# Patient Record
Sex: MALE | Race: WHITE | NOT HISPANIC OR LATINO | Employment: OTHER
[De-identification: names, ages, dates, MRNs, and addresses within clinical notes are randomized per-mention and may not be internally consistent; named-entity substitution may affect disease eponyms.]

---

## 2018-06-18 ENCOUNTER — HOSPITAL ENCOUNTER (OUTPATIENT)
Dept: HOME HEALTH SERVICES | Facility: HOME HEALTHCARE | Age: 38
Setting detail: SPECIMEN
Discharge: HOME | End: 2018-06-18
Attending: INTERNAL MEDICINE | Admitting: INTERNAL MEDICINE

## 2018-06-18 LAB
ANION GAP SERPL CALC-SCNC: 16.8 MMOL/L (ref 10–20)
BUN SERPL-MCNC: 80 MG/DL (ref 8–20)
BUN/CREAT SERPL: 34.8 (ref 6.2–20.3)
CALCIUM SERPL-MCNC: 9.6 MG/DL (ref 8.9–10.3)
CHLORIDE SERPL-SCNC: 93 MMOL/L (ref 101–111)
CONV CO2: 32 MMOL/L (ref 22–32)
CREAT UR-MCNC: 2.3 MG/DL (ref 0.7–1.2)
GLUCOSE SERPL-MCNC: 157 MG/DL (ref 65–99)
POTASSIUM SERPL-SCNC: 4.8 MMOL/L (ref 3.6–5.1)
SODIUM SERPL-SCNC: 137 MMOL/L (ref 136–144)

## 2019-03-11 ENCOUNTER — INPATIENT HOSPITAL (OUTPATIENT)
Dept: URBAN - METROPOLITAN AREA HOSPITAL 84 | Facility: HOSPITAL | Age: 39
End: 2019-03-11

## 2019-03-11 DIAGNOSIS — R18.8 OTHER ASCITES: ICD-10-CM

## 2019-03-11 DIAGNOSIS — K59.00 CONSTIPATION, UNSPECIFIED: ICD-10-CM

## 2019-03-11 DIAGNOSIS — J96.90 RESPIRATORY FAILURE, UNSPECIFIED, UNSPECIFIED WHETHER WITH H: ICD-10-CM

## 2019-03-11 DIAGNOSIS — D50.9 IRON DEFICIENCY ANEMIA, UNSPECIFIED: ICD-10-CM

## 2019-03-11 PROCEDURE — 99222 1ST HOSP IP/OBS MODERATE 55: CPT | Performed by: NURSE PRACTITIONER

## 2019-03-12 ENCOUNTER — INPATIENT HOSPITAL (OUTPATIENT)
Dept: URBAN - METROPOLITAN AREA HOSPITAL 84 | Facility: HOSPITAL | Age: 39
End: 2019-03-12

## 2019-03-12 DIAGNOSIS — R18.8 OTHER ASCITES: ICD-10-CM

## 2019-03-12 DIAGNOSIS — D50.9 IRON DEFICIENCY ANEMIA, UNSPECIFIED: ICD-10-CM

## 2019-03-12 DIAGNOSIS — K59.00 CONSTIPATION, UNSPECIFIED: ICD-10-CM

## 2019-03-12 DIAGNOSIS — J96.90 RESPIRATORY FAILURE, UNSPECIFIED, UNSPECIFIED WHETHER WITH H: ICD-10-CM

## 2019-03-12 PROCEDURE — 99232 SBSQ HOSP IP/OBS MODERATE 35: CPT | Performed by: NURSE PRACTITIONER

## 2019-03-13 PROCEDURE — 99232 SBSQ HOSP IP/OBS MODERATE 35: CPT | Performed by: NURSE PRACTITIONER

## 2019-03-14 ENCOUNTER — INPATIENT HOSPITAL (OUTPATIENT)
Dept: URBAN - METROPOLITAN AREA HOSPITAL 84 | Facility: HOSPITAL | Age: 39
End: 2019-03-14

## 2019-03-14 DIAGNOSIS — D50.9 IRON DEFICIENCY ANEMIA, UNSPECIFIED: ICD-10-CM

## 2019-03-14 DIAGNOSIS — R18.8 OTHER ASCITES: ICD-10-CM

## 2019-03-14 DIAGNOSIS — K59.00 CONSTIPATION, UNSPECIFIED: ICD-10-CM

## 2019-03-14 DIAGNOSIS — J96.90 RESPIRATORY FAILURE, UNSPECIFIED, UNSPECIFIED WHETHER WITH H: ICD-10-CM

## 2019-03-14 PROCEDURE — 99232 SBSQ HOSP IP/OBS MODERATE 35: CPT | Performed by: NURSE PRACTITIONER

## 2019-03-25 ENCOUNTER — HOSPITAL ENCOUNTER (OUTPATIENT)
Dept: INFUSION THERAPY | Facility: HOSPITAL | Age: 39
Discharge: HOME | End: 2019-03-25
Attending: INTERNAL MEDICINE | Admitting: INTERNAL MEDICINE

## 2019-03-25 LAB
HCT VFR BLD AUTO: 27.8 % (ref 40–54)
HGB BLD-MCNC: 8.5 G/DL (ref 14–18)

## 2019-03-27 ENCOUNTER — HOSPITAL ENCOUNTER (OUTPATIENT)
Dept: ONCOLOGY | Facility: CLINIC | Age: 39
Setting detail: INFUSION SERIES
Discharge: HOME | End: 2019-03-27
Attending: INTERNAL MEDICINE | Admitting: INTERNAL MEDICINE

## 2019-03-27 ENCOUNTER — CLINICAL SUPPORT (OUTPATIENT)
Dept: ONCOLOGY | Facility: HOSPITAL | Age: 39
End: 2019-03-27

## 2019-03-27 NOTE — PROGRESS NOTES
PATIENTS ONCOLOGY RECORD LOCATED IN [facility]      Subjective     Name:  ZOHRA HERNANDEZ     Date:  2019  Address:  [address] [place] [address]  Home: [unfilled]  :  1980 AGE:  38 y.o.        RECORDS OBTAINED:  Patients Oncology Record is located in [facility]

## 2019-03-29 ENCOUNTER — LAB REQUISITION (OUTPATIENT)
Dept: LAB | Facility: HOSPITAL | Age: 39
End: 2019-03-29

## 2019-03-29 DIAGNOSIS — Z00.00 ROUTINE GENERAL MEDICAL EXAMINATION AT A HEALTH CARE FACILITY: ICD-10-CM

## 2019-03-29 LAB
ALBUMIN SERPL-MCNC: 3.7 G/DL (ref 3.5–5.2)
ALBUMIN/GLOB SERPL: 1.7 G/DL
ALP SERPL-CCNC: 89 U/L (ref 39–117)
ALT SERPL W P-5'-P-CCNC: 29 U/L (ref 1–41)
ANION GAP SERPL CALCULATED.3IONS-SCNC: 11.5 MMOL/L
AST SERPL-CCNC: 21 U/L (ref 1–40)
BASOPHILS # BLD AUTO: 0.01 10*3/MM3 (ref 0–0.2)
BASOPHILS NFR BLD AUTO: 0.1 % (ref 0–1.5)
BILIRUB SERPL-MCNC: 0.5 MG/DL (ref 0.2–1.2)
BUN BLD-MCNC: 42 MG/DL (ref 6–20)
BUN/CREAT SERPL: 25.1 (ref 7–25)
CALCIUM SPEC-SCNC: 8.5 MG/DL (ref 8.6–10.5)
CHLORIDE SERPL-SCNC: 105 MMOL/L (ref 98–107)
CO2 SERPL-SCNC: 25.5 MMOL/L (ref 22–29)
CREAT BLD-MCNC: 1.67 MG/DL (ref 0.76–1.27)
DEPRECATED RDW RBC AUTO: 62.9 FL (ref 37–54)
EOSINOPHIL # BLD AUTO: 0.04 10*3/MM3 (ref 0–0.4)
EOSINOPHIL NFR BLD AUTO: 0.4 % (ref 0.3–6.2)
ERYTHROCYTE [DISTWIDTH] IN BLOOD BY AUTOMATED COUNT: 19.7 % (ref 12.3–15.4)
GFR SERPL CREATININE-BSD FRML MDRD: 47 ML/MIN/1.73
GLOBULIN UR ELPH-MCNC: 2.2 GM/DL
GLUCOSE BLD-MCNC: 86 MG/DL (ref 65–99)
HCT VFR BLD AUTO: 28.1 % (ref 37.5–51)
HGB BLD-MCNC: 8.1 G/DL (ref 13–17.7)
IMM GRANULOCYTES # BLD AUTO: 0.07 10*3/MM3 (ref 0–0.05)
IMM GRANULOCYTES NFR BLD AUTO: 0.7 % (ref 0–0.5)
LYMPHOCYTES # BLD AUTO: 0.97 10*3/MM3 (ref 0.7–3.1)
LYMPHOCYTES NFR BLD AUTO: 10.3 % (ref 19.6–45.3)
MCH RBC QN AUTO: 25.2 PG (ref 26.6–33)
MCHC RBC AUTO-ENTMCNC: 28.8 G/DL (ref 31.5–35.7)
MCV RBC AUTO: 87.3 FL (ref 79–97)
MONOCYTES # BLD AUTO: 0.65 10*3/MM3 (ref 0.1–0.9)
MONOCYTES NFR BLD AUTO: 6.9 % (ref 5–12)
NEUTROPHILS # BLD AUTO: 7.67 10*3/MM3 (ref 1.4–7)
NEUTROPHILS NFR BLD AUTO: 81.6 % (ref 42.7–76)
NRBC BLD AUTO-RTO: 0 /100 WBC (ref 0–0)
PLATELET # BLD AUTO: 88 10*3/MM3 (ref 140–450)
PMV BLD AUTO: 12.4 FL (ref 6–12)
POTASSIUM BLD-SCNC: 5.2 MMOL/L (ref 3.5–5.2)
PROT SERPL-MCNC: 5.9 G/DL (ref 6–8.5)
RBC # BLD AUTO: 3.22 10*6/MM3 (ref 4.14–5.8)
SODIUM BLD-SCNC: 142 MMOL/L (ref 136–145)
TROPONIN T SERPL-MCNC: 0.03 NG/ML (ref 0–0.03)
WBC NRBC COR # BLD: 9.41 10*3/MM3 (ref 3.4–10.8)

## 2019-03-29 PROCEDURE — 85025 COMPLETE CBC W/AUTO DIFF WBC: CPT

## 2019-03-29 PROCEDURE — 80053 COMPREHEN METABOLIC PANEL: CPT

## 2019-03-29 PROCEDURE — 84484 ASSAY OF TROPONIN QUANT: CPT

## 2019-04-03 ENCOUNTER — HOSPITAL ENCOUNTER (OUTPATIENT)
Dept: INFUSION THERAPY | Facility: HOSPITAL | Age: 39
Discharge: HOME | End: 2019-04-03
Attending: INTERNAL MEDICINE | Admitting: INTERNAL MEDICINE

## 2019-04-03 LAB
BASOPHILS # BLD AUTO: 0 10*3/UL (ref 0–0.2)
BASOPHILS NFR BLD AUTO: 0 % (ref 0–2)
DIFFERENTIAL METHOD BLD: (no result)
EOSINOPHIL # BLD AUTO: 0 % (ref 0–3)
EOSINOPHIL # BLD AUTO: 0 10*3/UL (ref 0–0.3)
ERYTHROCYTE [DISTWIDTH] IN BLOOD BY AUTOMATED COUNT: 21.2 % (ref 11.5–14.5)
HCT VFR BLD AUTO: 25.6 % (ref 40–54)
HGB BLD-MCNC: (no result) G/DL (ref 14–18)
LYMPHOCYTES # BLD AUTO: 0.4 10*3/UL (ref 0.8–4.8)
LYMPHOCYTES NFR BLD AUTO: 6 % (ref 18–42)
Lab: NORMAL
MCH RBC QN AUTO: 25.7 PG (ref 26–32)
MCHC RBC AUTO-ENTMCNC: 31.1 G/DL (ref 32–36)
MCV RBC AUTO: 82.8 FL (ref 80–94)
MONOCYTES # BLD AUTO: 0.1 10*3/UL (ref 0.1–1.3)
MONOCYTES NFR BLD AUTO: 2 % (ref 2–11)
NEUTROPHILS # BLD AUTO: 6.4 10*3/UL (ref 2.3–8.6)
NEUTROPHILS NFR BLD AUTO: 92 % (ref 50–75)
NRBC BLD AUTO-RTO: 0 /100{WBCS}
NRBC/RBC NFR BLD MANUAL: 0 10*3/UL
PLATELET # BLD AUTO: (no result) 10*3/UL (ref 150–450)
PMV BLD AUTO: 8.1 FL (ref 7.4–10.4)
RBC # BLD AUTO: 3.09 10*6/UL (ref 4.6–6)
WBC # BLD AUTO: 7 10*3/UL (ref 4.5–11.5)

## 2019-04-04 ENCOUNTER — HOSPITAL ENCOUNTER (OUTPATIENT)
Dept: ONCOLOGY | Facility: CLINIC | Age: 39
Setting detail: INFUSION SERIES
Discharge: HOME | End: 2019-04-04
Attending: INTERNAL MEDICINE | Admitting: INTERNAL MEDICINE

## 2019-04-04 ENCOUNTER — CLINICAL SUPPORT (OUTPATIENT)
Dept: ONCOLOGY | Facility: HOSPITAL | Age: 39
End: 2019-04-04

## 2019-04-04 NOTE — PROGRESS NOTES
PATIENTS ONCOLOGY RECORD LOCATED IN [facility]      Subjective     Name:  ZOHRA HERNANDEZ     Date:  2019  Address:  [address] [place] [address] [place] [facility]  Home: [unfilled]  :  1980 AGE:  38 y.o.        RECORDS OBTAINED:  Patients Oncology Record is located in [facility]

## 2019-04-05 ENCOUNTER — HOSPITAL ENCOUNTER (OUTPATIENT)
Dept: ONCOLOGY | Facility: CLINIC | Age: 39
Setting detail: INFUSION SERIES
Discharge: HOME | End: 2019-04-05
Attending: INTERNAL MEDICINE | Admitting: INTERNAL MEDICINE

## 2019-04-08 ENCOUNTER — CLINICAL SUPPORT (OUTPATIENT)
Dept: ONCOLOGY | Facility: HOSPITAL | Age: 39
End: 2019-04-08

## 2019-04-08 ENCOUNTER — HOSPITAL ENCOUNTER (OUTPATIENT)
Dept: ONCOLOGY | Facility: CLINIC | Age: 39
Setting detail: INFUSION SERIES
Discharge: HOME | End: 2019-04-08
Attending: INTERNAL MEDICINE | Admitting: INTERNAL MEDICINE

## 2019-04-08 NOTE — PROGRESS NOTES
PATIENTS ONCOLOGY RECORD LOCATED IN [facility]      Subjective     Name:  ZOHRA HERNANDEZ     Date:  2019  Address:  [address] [place] [address] [place] [facility]  Home: [unfilled]  :  1980 AGE:  38 y.o.        RECORDS OBTAINED:  Patients Oncology Record is located in [facility]

## 2019-04-15 ENCOUNTER — HOSPITAL ENCOUNTER (OUTPATIENT)
Dept: ONCOLOGY | Facility: HOSPITAL | Age: 39
Discharge: HOME | End: 2019-04-15
Attending: INTERNAL MEDICINE | Admitting: INTERNAL MEDICINE

## 2019-04-15 ENCOUNTER — CLINICAL SUPPORT (OUTPATIENT)
Dept: ONCOLOGY | Facility: HOSPITAL | Age: 39
End: 2019-04-15

## 2019-04-15 ENCOUNTER — HOSPITAL ENCOUNTER (OUTPATIENT)
Dept: ONCOLOGY | Facility: CLINIC | Age: 39
Setting detail: INFUSION SERIES
Discharge: HOME | End: 2019-04-15
Attending: INTERNAL MEDICINE | Admitting: INTERNAL MEDICINE

## 2019-04-18 ENCOUNTER — CLINICAL SUPPORT (OUTPATIENT)
Dept: ONCOLOGY | Facility: HOSPITAL | Age: 39
End: 2019-04-18

## 2019-04-18 ENCOUNTER — HOSPITAL ENCOUNTER (OUTPATIENT)
Dept: ONCOLOGY | Facility: HOSPITAL | Age: 39
Discharge: HOME | End: 2019-04-18
Attending: INTERNAL MEDICINE | Admitting: INTERNAL MEDICINE

## 2019-04-18 ENCOUNTER — HOSPITAL ENCOUNTER (OUTPATIENT)
Dept: ONCOLOGY | Facility: CLINIC | Age: 39
Setting detail: INFUSION SERIES
Discharge: HOME | End: 2019-04-18
Attending: INTERNAL MEDICINE | Admitting: INTERNAL MEDICINE

## 2019-04-18 LAB
ANION GAP SERPL CALC-SCNC: 19.8 MMOL/L (ref 10–20)
BUN SERPL-MCNC: 61 MG/DL (ref 8–20)
BUN/CREAT SERPL: 26.5 (ref 6.2–20.3)
CALCIUM SERPL-MCNC: 9.1 MG/DL (ref 8.9–10.3)
CHLORIDE SERPL-SCNC: 99 MMOL/L (ref 101–111)
CONV CO2: 26 MMOL/L (ref 22–32)
CREAT UR-MCNC: 2.3 MG/DL (ref 0.7–1.2)
GLUCOSE SERPL-MCNC: 142 MG/DL (ref 65–99)
POTASSIUM SERPL-SCNC: 4.8 MMOL/L (ref 3.6–5.1)
SODIUM SERPL-SCNC: 140 MMOL/L (ref 136–144)

## 2019-04-18 NOTE — PROGRESS NOTES
PATIENTS ONCOLOGY RECORD LOCATED IN [facility]      Subjective     Name:  ZOHRA HERNANDEZ     Date:  2019  Address:  [address] [place] [address] [place] [facility]  Home: [unfilled]  :  1980 AGE:  38 y.o.        RECORDS OBTAINED:  Patients Oncology Record is located in [facility]

## 2019-08-19 ENCOUNTER — TELEPHONE (OUTPATIENT)
Dept: ONCOLOGY | Facility: CLINIC | Age: 39
End: 2019-08-19

## 2019-08-19 NOTE — TELEPHONE ENCOUNTER
Mr. Crockett left 2nd message about scheduling follow up with Dr. Mak.  Returned call to [phone], no ans.  Contacted other home number listed, which was for nursing facility and he is no longer there.  Tried [phone], number rang and then went to busy.

## 2019-08-19 NOTE — TELEPHONE ENCOUNTER
Mr. Crockett left message 8/16 to schedule appt w/ Dr. Mak.  Returned his call on 8/19, left message for him to recall to reschedule.

## 2019-08-20 ENCOUNTER — TELEPHONE (OUTPATIENT)
Dept: ONCOLOGY | Facility: CLINIC | Age: 39
End: 2019-08-20

## 2019-08-20 NOTE — TELEPHONE ENCOUNTER
Returned call to Mr. Crockett from 8/19 message.  Scheduled appt for 8/30 as he preferred Fridays.

## 2019-08-21 ENCOUNTER — HOSPITAL ENCOUNTER (INPATIENT)
Facility: HOSPITAL | Age: 39
LOS: 9 days | Discharge: HOME | End: 2019-08-30
Attending: EMERGENCY MEDICINE | Admitting: INTERNAL MEDICINE

## 2019-08-21 ENCOUNTER — APPOINTMENT (OUTPATIENT)
Dept: ULTRASOUND IMAGING | Facility: HOSPITAL | Age: 39
End: 2019-08-21

## 2019-08-21 ENCOUNTER — APPOINTMENT (OUTPATIENT)
Dept: GENERAL RADIOLOGY | Facility: HOSPITAL | Age: 39
End: 2019-08-21

## 2019-08-21 DIAGNOSIS — D69.6 THROMBOCYTOPENIA (HCC): ICD-10-CM

## 2019-08-21 DIAGNOSIS — R18.8 OTHER ASCITES: Primary | ICD-10-CM

## 2019-08-21 DIAGNOSIS — E87.70 HYPERVOLEMIA, UNSPECIFIED HYPERVOLEMIA TYPE: ICD-10-CM

## 2019-08-21 DIAGNOSIS — N18.4 STAGE 4 CHRONIC KIDNEY DISEASE (HCC): ICD-10-CM

## 2019-08-21 DIAGNOSIS — N18.30 CHRONIC RENAL FAILURE, STAGE 3 (MODERATE) (HCC): ICD-10-CM

## 2019-08-21 DIAGNOSIS — R07.9 CHEST PAIN, UNSPECIFIED TYPE: ICD-10-CM

## 2019-08-21 PROBLEM — J45.909 ASTHMA: Chronic | Status: ACTIVE | Noted: 2019-08-21

## 2019-08-21 PROBLEM — M19.90 ARTHRITIS: Status: ACTIVE | Noted: 2019-08-21

## 2019-08-21 PROBLEM — J45.909 ASTHMA: Status: ACTIVE | Noted: 2019-08-21

## 2019-08-21 PROBLEM — E55.9 VITAMIN D DEFICIENCY: Status: ACTIVE | Noted: 2019-08-21

## 2019-08-21 PROBLEM — E55.9 VITAMIN D DEFICIENCY: Chronic | Status: ACTIVE | Noted: 2019-08-21

## 2019-08-21 PROBLEM — I10 BENIGN ESSENTIAL HYPERTENSION: Status: ACTIVE | Noted: 2019-08-21

## 2019-08-21 PROBLEM — F32.A DEPRESSION: Status: ACTIVE | Noted: 2019-08-21

## 2019-08-21 PROBLEM — I73.9 PVD (PERIPHERAL VASCULAR DISEASE) (HCC): Status: ACTIVE | Noted: 2019-08-21

## 2019-08-21 PROBLEM — F32.A DEPRESSION: Chronic | Status: ACTIVE | Noted: 2019-08-21

## 2019-08-21 LAB
ALBUMIN SERPL-MCNC: 2.3 G/DL (ref 3.5–4.8)
ALBUMIN/GLOB SERPL: 0.5 G/DL (ref 1–1.7)
ALP SERPL-CCNC: 138 U/L (ref 32–91)
ALT SERPL W P-5'-P-CCNC: 38 U/L (ref 17–63)
ANION GAP SERPL CALCULATED.3IONS-SCNC: 18.4 MMOL/L (ref 5–15)
AST SERPL-CCNC: 46 U/L (ref 15–41)
BASOPHILS # BLD AUTO: 0.1 10*3/MM3 (ref 0–0.2)
BASOPHILS NFR BLD AUTO: 0.5 % (ref 0–1.5)
BILIRUB SERPL-MCNC: 0.4 MG/DL (ref 0.3–1.2)
BNP SERPL-MCNC: 1453 PG/ML
BUN BLD-MCNC: 83 MG/DL (ref 8–20)
BUN/CREAT SERPL: 34.6 (ref 6.2–20.3)
CALCIUM SPEC-SCNC: 8.4 MG/DL (ref 8.9–10.3)
CHLORIDE SERPL-SCNC: 97 MMOL/L (ref 101–111)
CK SERPL-CCNC: 25 U/L (ref 49–397)
CO2 SERPL-SCNC: 22 MMOL/L (ref 22–32)
CREAT BLD-MCNC: 2.4 MG/DL (ref 0.7–1.2)
CREAT UR-MCNC: 32.9 MG/DL
DEPRECATED RDW RBC AUTO: 56 FL (ref 37–54)
EOSINOPHIL # BLD AUTO: 0.1 10*3/MM3 (ref 0–0.4)
EOSINOPHIL NFR BLD AUTO: 0.4 % (ref 0.3–6.2)
EOSINOPHIL SPEC QL MICRO: 0 % EOS/100 CELLS (ref 0–0)
ERYTHROCYTE [DISTWIDTH] IN BLOOD BY AUTOMATED COUNT: 17.9 % (ref 12.3–15.4)
GFR SERPL CREATININE-BSD FRML MDRD: 31 ML/MIN/1.73
GLOBULIN UR ELPH-MCNC: 4.5 GM/DL (ref 2.5–3.8)
GLUCOSE BLD-MCNC: 125 MG/DL (ref 65–99)
HCT VFR BLD AUTO: 27.5 % (ref 37.5–51)
HGB BLD-MCNC: 8.5 G/DL (ref 13–17.7)
HOLD SPECIMEN: NORMAL
INR PPP: 1.49 (ref 0.9–1.1)
INR PPP: 1.56 (ref 0.9–1.1)
LIPASE SERPL-CCNC: 30 U/L (ref 22–51)
LYMPHOCYTES # BLD AUTO: 1.3 10*3/MM3 (ref 0.7–3.1)
LYMPHOCYTES NFR BLD AUTO: 10.8 % (ref 19.6–45.3)
MCH RBC QN AUTO: 26.8 PG (ref 26.6–33)
MCHC RBC AUTO-ENTMCNC: 30.8 G/DL (ref 31.5–35.7)
MCV RBC AUTO: 87 FL (ref 79–97)
MONOCYTES # BLD AUTO: 1.3 10*3/MM3 (ref 0.1–0.9)
MONOCYTES NFR BLD AUTO: 10.9 % (ref 5–12)
NEUTROPHILS # BLD AUTO: 9 10*3/MM3 (ref 1.7–7)
NEUTROPHILS NFR BLD AUTO: 77.4 % (ref 42.7–76)
NRBC BLD AUTO-RTO: 0.3 /100 WBC (ref 0–0.2)
PLATELET # BLD AUTO: 53 10*3/MM3 (ref 140–450)
PMV BLD AUTO: 8.3 FL (ref 6–12)
POTASSIUM BLD-SCNC: 4.6 MMOL/L (ref 3.6–5.1)
POTASSIUM BLD-SCNC: 5.4 MMOL/L (ref 3.6–5.1)
PROCALCITONIN SERPL-MCNC: 10.77 NG/ML (ref 0–0.5)
PROT SERPL-MCNC: 6.8 G/DL (ref 6.1–7.9)
PROT UR-MCNC: 295 MG/DL
PROT/CREAT UR: 8966.6 MG/G CREA (ref 0–200)
PROTHROMBIN TIME: 14.6 SECONDS (ref 9.6–11.7)
PROTHROMBIN TIME: 15.3 SECONDS (ref 9.6–11.7)
RBC # BLD AUTO: 3.16 10*6/MM3 (ref 4.14–5.8)
SODIUM BLD-SCNC: 132 MMOL/L (ref 136–144)
SODIUM UR-SCNC: 70 MMOL/L
TROPONIN I SERPL-MCNC: 0.03 NG/ML (ref 0–0.03)
TROPONIN I SERPL-MCNC: 0.03 NG/ML (ref 0–0.03)
URATE SERPL-MCNC: 7.4 MG/DL (ref 4.8–8.7)
WBC NRBC COR # BLD: 11.6 10*3/MM3 (ref 3.4–10.8)

## 2019-08-21 PROCEDURE — 83880 ASSAY OF NATRIURETIC PEPTIDE: CPT | Performed by: EMERGENCY MEDICINE

## 2019-08-21 PROCEDURE — 80053 COMPREHEN METABOLIC PANEL: CPT | Performed by: EMERGENCY MEDICINE

## 2019-08-21 PROCEDURE — 83690 ASSAY OF LIPASE: CPT | Performed by: EMERGENCY MEDICINE

## 2019-08-21 PROCEDURE — 85610 PROTHROMBIN TIME: CPT | Performed by: NURSE PRACTITIONER

## 2019-08-21 PROCEDURE — 25010000002 ALBUMIN HUMAN 25% PER 50 ML: Performed by: INTERNAL MEDICINE

## 2019-08-21 PROCEDURE — 84484 ASSAY OF TROPONIN QUANT: CPT | Performed by: NURSE PRACTITIONER

## 2019-08-21 PROCEDURE — 85610 PROTHROMBIN TIME: CPT | Performed by: EMERGENCY MEDICINE

## 2019-08-21 PROCEDURE — 84165 PROTEIN E-PHORESIS SERUM: CPT | Performed by: INTERNAL MEDICINE

## 2019-08-21 PROCEDURE — 71045 X-RAY EXAM CHEST 1 VIEW: CPT | Performed by: EMERGENCY MEDICINE

## 2019-08-21 PROCEDURE — 63710000001 MYCOPHENOLATE MOFETIL PER 250 MG: Performed by: INTERNAL MEDICINE

## 2019-08-21 PROCEDURE — 99284 EMERGENCY DEPT VISIT MOD MDM: CPT

## 2019-08-21 PROCEDURE — 84550 ASSAY OF BLOOD/URIC ACID: CPT | Performed by: INTERNAL MEDICINE

## 2019-08-21 PROCEDURE — 86160 COMPLEMENT ANTIGEN: CPT | Performed by: INTERNAL MEDICINE

## 2019-08-21 PROCEDURE — 82550 ASSAY OF CK (CPK): CPT | Performed by: INTERNAL MEDICINE

## 2019-08-21 PROCEDURE — P9047 ALBUMIN (HUMAN), 25%, 50ML: HCPCS | Performed by: INTERNAL MEDICINE

## 2019-08-21 PROCEDURE — 84156 ASSAY OF PROTEIN URINE: CPT | Performed by: INTERNAL MEDICINE

## 2019-08-21 PROCEDURE — 76775 US EXAM ABDO BACK WALL LIM: CPT | Performed by: INTERNAL MEDICINE

## 2019-08-21 PROCEDURE — 84300 ASSAY OF URINE SODIUM: CPT | Performed by: INTERNAL MEDICINE

## 2019-08-21 PROCEDURE — 84145 PROCALCITONIN (PCT): CPT | Performed by: NURSE PRACTITIONER

## 2019-08-21 PROCEDURE — 82570 ASSAY OF URINE CREATININE: CPT | Performed by: INTERNAL MEDICINE

## 2019-08-21 PROCEDURE — 99223 1ST HOSP IP/OBS HIGH 75: CPT | Performed by: INTERNAL MEDICINE

## 2019-08-21 PROCEDURE — 84132 ASSAY OF SERUM POTASSIUM: CPT | Performed by: INTERNAL MEDICINE

## 2019-08-21 PROCEDURE — 84484 ASSAY OF TROPONIN QUANT: CPT | Performed by: EMERGENCY MEDICINE

## 2019-08-21 PROCEDURE — 25010000002 FONDAPARINUX PER 0.5 MG: Performed by: NURSE PRACTITIONER

## 2019-08-21 PROCEDURE — 85025 COMPLETE CBC W/AUTO DIFF WBC: CPT | Performed by: EMERGENCY MEDICINE

## 2019-08-21 PROCEDURE — 87205 SMEAR GRAM STAIN: CPT | Performed by: INTERNAL MEDICINE

## 2019-08-21 PROCEDURE — 25010000002 CHLOROTHIAZIDE PER 500 MG: Performed by: INTERNAL MEDICINE

## 2019-08-21 PROCEDURE — 93005 ELECTROCARDIOGRAM TRACING: CPT | Performed by: EMERGENCY MEDICINE

## 2019-08-21 RX ORDER — QUETIAPINE FUMARATE 25 MG/1
50 TABLET, FILM COATED ORAL NIGHTLY
Status: DISCONTINUED | OUTPATIENT
Start: 2019-08-21 | End: 2019-08-28

## 2019-08-21 RX ORDER — FUROSEMIDE 20 MG/1
60 TABLET ORAL 2 TIMES DAILY
COMMUNITY
End: 2019-08-30 | Stop reason: HOSPADM

## 2019-08-21 RX ORDER — CLONIDINE HYDROCHLORIDE 0.1 MG/1
0.1 TABLET ORAL EVERY 12 HOURS SCHEDULED
Status: DISCONTINUED | OUTPATIENT
Start: 2019-08-21 | End: 2019-08-23

## 2019-08-21 RX ORDER — FAMOTIDINE 20 MG/1
20 TABLET, FILM COATED ORAL 2 TIMES DAILY PRN
COMMUNITY

## 2019-08-21 RX ORDER — MYCOPHENOLATE MOFETIL 250 MG/1
500 CAPSULE ORAL EVERY 12 HOURS SCHEDULED
Status: DISCONTINUED | OUTPATIENT
Start: 2019-08-21 | End: 2019-08-25

## 2019-08-21 RX ORDER — LEVETIRACETAM 500 MG/1
500 TABLET ORAL EVERY 12 HOURS SCHEDULED
Status: DISCONTINUED | OUTPATIENT
Start: 2019-08-21 | End: 2019-08-30 | Stop reason: HOSPADM

## 2019-08-21 RX ORDER — ALBUMIN (HUMAN) 12.5 G/50ML
112.5 SOLUTION INTRAVENOUS ONCE
Status: COMPLETED | OUTPATIENT
Start: 2019-08-21 | End: 2019-08-22

## 2019-08-21 RX ORDER — ALBUMIN (HUMAN) 12.5 G/50ML
75 SOLUTION INTRAVENOUS ONCE
Status: COMPLETED | OUTPATIENT
Start: 2019-08-21 | End: 2019-08-22

## 2019-08-21 RX ORDER — CALCIUM GLUCONATE 20 MG/ML
1 INJECTION, SOLUTION INTRAVENOUS ONCE
Status: COMPLETED | OUTPATIENT
Start: 2019-08-21 | End: 2019-08-21

## 2019-08-21 RX ORDER — CLONAZEPAM 0.5 MG/1
0.5 TABLET ORAL 2 TIMES DAILY
COMMUNITY

## 2019-08-21 RX ORDER — ALBUMIN (HUMAN) 12.5 G/50ML
62.5 SOLUTION INTRAVENOUS ONCE
Status: COMPLETED | OUTPATIENT
Start: 2019-08-21 | End: 2019-08-22

## 2019-08-21 RX ORDER — SODIUM POLYSTYRENE SULFONATE 15 G/60ML
30 SUSPENSION ORAL; RECTAL ONCE
Status: COMPLETED | OUTPATIENT
Start: 2019-08-21 | End: 2019-08-21

## 2019-08-21 RX ORDER — HYDRALAZINE HYDROCHLORIDE 100 MG/1
100 TABLET, FILM COATED ORAL 3 TIMES DAILY
COMMUNITY
End: 2019-08-30 | Stop reason: HOSPADM

## 2019-08-21 RX ORDER — CLONIDINE HYDROCHLORIDE 0.1 MG/1
0.1 TABLET ORAL 2 TIMES DAILY
COMMUNITY
End: 2019-08-30 | Stop reason: HOSPADM

## 2019-08-21 RX ORDER — FOLIC ACID 1 MG/1
1 TABLET ORAL DAILY
COMMUNITY

## 2019-08-21 RX ORDER — ALBUMIN (HUMAN) 12.5 G/50ML
37.5 SOLUTION INTRAVENOUS ONCE
Status: COMPLETED | OUTPATIENT
Start: 2019-08-21 | End: 2019-08-22

## 2019-08-21 RX ORDER — ALBUMIN (HUMAN) 12.5 G/50ML
100 SOLUTION INTRAVENOUS ONCE
Status: COMPLETED | OUTPATIENT
Start: 2019-08-21 | End: 2019-08-22

## 2019-08-21 RX ORDER — FAMOTIDINE 20 MG/1
20 TABLET, FILM COATED ORAL DAILY
Status: DISCONTINUED | OUTPATIENT
Start: 2019-08-21 | End: 2019-08-30 | Stop reason: HOSPADM

## 2019-08-21 RX ORDER — BACLOFEN 10 MG/1
20 TABLET ORAL 3 TIMES DAILY PRN
Status: DISCONTINUED | OUTPATIENT
Start: 2019-08-21 | End: 2019-08-28

## 2019-08-21 RX ORDER — FAMOTIDINE 20 MG/1
20 TABLET, FILM COATED ORAL 2 TIMES DAILY PRN
Status: DISCONTINUED | OUTPATIENT
Start: 2019-08-21 | End: 2019-08-22 | Stop reason: SDUPTHER

## 2019-08-21 RX ORDER — FAMOTIDINE 20 MG/1
20 TABLET, FILM COATED ORAL 2 TIMES DAILY
COMMUNITY
End: 2019-08-21

## 2019-08-21 RX ORDER — BUPRENORPHINE HYDROCHLORIDE 8 MG/1
20 TABLET SUBLINGUAL DAILY
COMMUNITY

## 2019-08-21 RX ORDER — CLONAZEPAM 0.5 MG/1
0.5 TABLET ORAL 2 TIMES DAILY
Status: DISCONTINUED | OUTPATIENT
Start: 2019-08-21 | End: 2019-08-30 | Stop reason: HOSPADM

## 2019-08-21 RX ORDER — ROPINIROLE 0.5 MG/1
0.5 TABLET, FILM COATED ORAL DAILY PRN
COMMUNITY

## 2019-08-21 RX ORDER — AMLODIPINE BESYLATE 10 MG/1
10 TABLET ORAL DAILY
COMMUNITY
End: 2019-12-13 | Stop reason: HOSPADM

## 2019-08-21 RX ORDER — LEVETIRACETAM 500 MG/1
500 TABLET ORAL 2 TIMES DAILY
COMMUNITY

## 2019-08-21 RX ORDER — GREEN TEA/HOODIA GORDONII 315-12.5MG
1 CAPSULE ORAL 2 TIMES DAILY
COMMUNITY

## 2019-08-21 RX ORDER — LACOSAMIDE 100 MG/1
100 TABLET ORAL 2 TIMES DAILY
Status: DISCONTINUED | OUTPATIENT
Start: 2019-08-21 | End: 2019-08-30 | Stop reason: HOSPADM

## 2019-08-21 RX ORDER — QUETIAPINE FUMARATE 50 MG/1
50 TABLET, FILM COATED ORAL NIGHTLY PRN
COMMUNITY

## 2019-08-21 RX ORDER — PHENOL 1.4 %
600 AEROSOL, SPRAY (ML) MUCOUS MEMBRANE 2 TIMES DAILY
COMMUNITY

## 2019-08-21 RX ORDER — GABAPENTIN 400 MG/1
400 CAPSULE ORAL 4 TIMES DAILY
Status: DISCONTINUED | OUTPATIENT
Start: 2019-08-21 | End: 2019-08-30 | Stop reason: HOSPADM

## 2019-08-21 RX ORDER — ALBUMIN (HUMAN) 12.5 G/50ML
87.5 SOLUTION INTRAVENOUS ONCE
Status: COMPLETED | OUTPATIENT
Start: 2019-08-21 | End: 2019-08-22

## 2019-08-21 RX ORDER — FONDAPARINUX SODIUM 7.5 MG/.6ML
7.5 INJECTION SUBCUTANEOUS DAILY
COMMUNITY

## 2019-08-21 RX ORDER — FOLIC ACID 1 MG/1
1 TABLET ORAL DAILY
Status: DISCONTINUED | OUTPATIENT
Start: 2019-08-22 | End: 2019-08-30 | Stop reason: HOSPADM

## 2019-08-21 RX ORDER — BUMETANIDE 0.25 MG/ML
2 INJECTION INTRAMUSCULAR; INTRAVENOUS ONCE
Status: COMPLETED | OUTPATIENT
Start: 2019-08-21 | End: 2019-08-21

## 2019-08-21 RX ORDER — CARVEDILOL 25 MG/1
25 TABLET ORAL 2 TIMES DAILY WITH MEALS
COMMUNITY

## 2019-08-21 RX ORDER — FONDAPARINUX SODIUM 7.5 MG/.6ML
7.5 INJECTION SUBCUTANEOUS DAILY
Status: DISCONTINUED | OUTPATIENT
Start: 2019-08-21 | End: 2019-08-25

## 2019-08-21 RX ORDER — SODIUM CHLORIDE 0.9 % (FLUSH) 0.9 %
10 SYRINGE (ML) INJECTION AS NEEDED
Status: DISCONTINUED | OUTPATIENT
Start: 2019-08-21 | End: 2019-08-30 | Stop reason: HOSPADM

## 2019-08-21 RX ORDER — ROPINIROLE 0.25 MG/1
0.5 TABLET, FILM COATED ORAL DAILY PRN
Status: DISCONTINUED | OUTPATIENT
Start: 2019-08-21 | End: 2019-08-28

## 2019-08-21 RX ORDER — ALBUMIN (HUMAN) 12.5 G/50ML
25 SOLUTION INTRAVENOUS EVERY 8 HOURS
Status: DISCONTINUED | OUTPATIENT
Start: 2019-08-21 | End: 2019-08-22

## 2019-08-21 RX ORDER — BACLOFEN 20 MG/1
20 TABLET ORAL 3 TIMES DAILY PRN
Status: ON HOLD | COMMUNITY
End: 2019-08-30 | Stop reason: SDUPTHER

## 2019-08-21 RX ORDER — LACOSAMIDE 100 MG/1
100 TABLET ORAL 2 TIMES DAILY
COMMUNITY

## 2019-08-21 RX ORDER — GABAPENTIN 800 MG/1
800 TABLET ORAL 4 TIMES DAILY
Status: ON HOLD | COMMUNITY
End: 2019-12-13 | Stop reason: SDUPTHER

## 2019-08-21 RX ORDER — BUMETANIDE 0.25 MG/ML
1 INJECTION INTRAMUSCULAR; INTRAVENOUS EVERY 8 HOURS
Status: DISCONTINUED | OUTPATIENT
Start: 2019-08-21 | End: 2019-08-22

## 2019-08-21 RX ORDER — AMLODIPINE BESYLATE 5 MG/1
10 TABLET ORAL DAILY
Status: DISCONTINUED | OUTPATIENT
Start: 2019-08-22 | End: 2019-08-23

## 2019-08-21 RX ORDER — CARVEDILOL 25 MG/1
25 TABLET ORAL 2 TIMES DAILY WITH MEALS
Status: DISCONTINUED | OUTPATIENT
Start: 2019-08-21 | End: 2019-08-23

## 2019-08-21 RX ORDER — PREDNISONE 20 MG/1
40 TABLET ORAL
Status: DISCONTINUED | OUTPATIENT
Start: 2019-08-22 | End: 2019-08-25

## 2019-08-21 RX ORDER — ALBUMIN (HUMAN) 12.5 G/50ML
50 SOLUTION INTRAVENOUS ONCE
Status: COMPLETED | OUTPATIENT
Start: 2019-08-21 | End: 2019-08-22

## 2019-08-21 RX ADMIN — QUETIAPINE 50 MG: 25 TABLET, FILM COATED ORAL at 22:41

## 2019-08-21 RX ADMIN — BUMETANIDE 2 MG: 0.25 INJECTION INTRAMUSCULAR; INTRAVENOUS at 11:36

## 2019-08-21 RX ADMIN — BUMETANIDE 1 MG: 0.25 INJECTION INTRAMUSCULAR; INTRAVENOUS at 19:59

## 2019-08-21 RX ADMIN — LEVETIRACETAM 500 MG: 500 TABLET, FILM COATED ORAL at 22:42

## 2019-08-21 RX ADMIN — CALCIUM GLUCONATE 50 ML: 20 INJECTION, SOLUTION INTRAVENOUS at 12:36

## 2019-08-21 RX ADMIN — CARVEDILOL 25 MG: 25 TABLET, FILM COATED ORAL at 22:42

## 2019-08-21 RX ADMIN — FAMOTIDINE 20 MG: 20 TABLET, FILM COATED ORAL at 19:59

## 2019-08-21 RX ADMIN — CHLOROTHIAZIDE SODIUM 250 MG: 500 INJECTION, POWDER, LYOPHILIZED, FOR SOLUTION INTRAVENOUS at 15:36

## 2019-08-21 RX ADMIN — CLONAZEPAM 0.5 MG: 0.5 TABLET ORAL at 22:42

## 2019-08-21 RX ADMIN — LACOSAMIDE 100 MG: 100 TABLET, FILM COATED ORAL at 22:41

## 2019-08-21 RX ADMIN — FONDAPARINUX SODIUM 7.5 MG: 7.5 INJECTION, SOLUTION SUBCUTANEOUS at 23:56

## 2019-08-21 RX ADMIN — CLONIDINE HYDROCHLORIDE 0.1 MG: 0.1 TABLET ORAL at 22:41

## 2019-08-21 RX ADMIN — MYCOPHENOLATE MOFETIL 500 MG: 250 CAPSULE ORAL at 20:05

## 2019-08-21 RX ADMIN — GABAPENTIN 400 MG: 400 CAPSULE ORAL at 22:42

## 2019-08-21 RX ADMIN — ALBUMIN HUMAN 25 G: 0.25 SOLUTION INTRAVENOUS at 17:58

## 2019-08-21 RX ADMIN — SODIUM POLYSTYRENE SULFONATE 30 G: 15 SUSPENSION ORAL; RECTAL at 12:36

## 2019-08-21 RX ADMIN — OYSTER SHELL CALCIUM WITH VITAMIN D 1 TABLET: 500; 200 TABLET, FILM COATED ORAL at 22:42

## 2019-08-21 RX ADMIN — SODIUM BICARBONATE 50 MEQ: 84 INJECTION, SOLUTION INTRAVENOUS at 12:33

## 2019-08-22 PROBLEM — L97.922 NON-PRESSURE CHRONIC ULCER OF LEFT LOWER LEG WITH FAT LAYER EXPOSED (HCC): Status: ACTIVE | Noted: 2019-08-22

## 2019-08-22 LAB
ALBUMIN FLD-MCNC: 1.4 G/DL
ALBUMIN SERPL-MCNC: 2.6 G/DL (ref 3.5–4.8)
ALBUMIN/GLOB SERPL: 0.7 G/DL (ref 1–1.7)
ALP SERPL-CCNC: 118 U/L (ref 32–91)
ALT SERPL W P-5'-P-CCNC: 28 U/L (ref 17–63)
AMYLASE FLD-CCNC: 15 U/L
ANION GAP SERPL CALCULATED.3IONS-SCNC: 20.2 MMOL/L (ref 5–15)
APPEARANCE FLD: ABNORMAL
AST SERPL-CCNC: 23 U/L (ref 15–41)
BILIRUB SERPL-MCNC: 0.9 MG/DL (ref 0.3–1.2)
BUN BLD-MCNC: 86 MG/DL (ref 8–20)
BUN/CREAT SERPL: 34.4 (ref 6.2–20.3)
CA-I SERPL ISE-MCNC: 1.17 MMOL/L (ref 1.2–1.3)
CALCIUM SPEC-SCNC: 8.4 MG/DL (ref 8.9–10.3)
CHLORIDE SERPL-SCNC: 97 MMOL/L (ref 101–111)
CO2 SERPL-SCNC: 23 MMOL/L (ref 22–32)
COLOR FLD: YELLOW
CREAT BLD-MCNC: 2.5 MG/DL (ref 0.7–1.2)
DEPRECATED RDW RBC AUTO: 56.4 FL (ref 37–54)
ERYTHROCYTE [DISTWIDTH] IN BLOOD BY AUTOMATED COUNT: 17.8 % (ref 12.3–15.4)
GFR SERPL CREATININE-BSD FRML MDRD: 29 ML/MIN/1.73
GLOBULIN UR ELPH-MCNC: 3.9 GM/DL (ref 2.5–3.8)
GLUCOSE BLD-MCNC: 93 MG/DL (ref 65–99)
GLUCOSE FLD-MCNC: 177 MG/DL
HCT VFR BLD AUTO: 25.5 % (ref 37.5–51)
HGB BLD-MCNC: 7.9 G/DL (ref 13–17.7)
INR PPP: 1.36 (ref 2–3)
IRON 24H UR-MRATE: 13 MCG/DL (ref 45–182)
LDH FLD-CCNC: 72 U/L
LYMPHOCYTES NFR FLD MANUAL: 3 %
MAGNESIUM SERPL-MCNC: 2.4 MG/DL (ref 1.8–2.5)
MCH RBC QN AUTO: 27.4 PG (ref 26.6–33)
MCHC RBC AUTO-ENTMCNC: 31 G/DL (ref 31.5–35.7)
MCV RBC AUTO: 88.4 FL (ref 79–97)
METHOD: ABNORMAL
MONOCYTES NFR FLD: 5 %
NEUTROPHILS NFR FLD MANUAL: 92 %
NUC CELL # FLD: 1515 /MM3
PHOSPHATE SERPL-MCNC: 6.8 MG/DL (ref 2.4–4.7)
PLATELET # BLD AUTO: 58 10*3/MM3 (ref 140–450)
PMV BLD AUTO: 8.6 FL (ref 6–12)
POTASSIUM BLD-SCNC: 4.2 MMOL/L (ref 3.6–5.1)
PROT FLD-MCNC: 3.4 G/DL
PROT SERPL-MCNC: 6.5 G/DL (ref 6.1–7.9)
PROTHROMBIN TIME: 13.5 SECONDS (ref 19.4–28.5)
RBC # BLD AUTO: 2.89 10*6/MM3 (ref 4.14–5.8)
SODIUM BLD-SCNC: 136 MMOL/L (ref 136–144)
TROPONIN I SERPL-MCNC: 0.03 NG/ML (ref 0–0.03)
TROPONIN I SERPL-MCNC: 0.03 NG/ML (ref 0–0.03)
TROPONIN I SERPL-MCNC: <0.03 NG/ML (ref 0–0.03)
TSH SERPL DL<=0.05 MIU/L-ACNC: 4.55 MIU/ML (ref 0.34–5.6)
WBC NRBC COR # BLD: 7.8 10*3/MM3 (ref 3.4–10.8)

## 2019-08-22 PROCEDURE — C1751 CATH, INF, PER/CENT/MIDLINE: HCPCS

## 2019-08-22 PROCEDURE — 76942 ECHO GUIDE FOR BIOPSY: CPT

## 2019-08-22 PROCEDURE — 49083 ABD PARACENTESIS W/IMAGING: CPT | Performed by: INTERNAL MEDICINE

## 2019-08-22 PROCEDURE — 0W9G3ZX DRAINAGE OF PERITONEAL CAVITY, PERCUTANEOUS APPROACH, DIAGNOSTIC: ICD-10-PCS | Performed by: INTERNAL MEDICINE

## 2019-08-22 PROCEDURE — 84443 ASSAY THYROID STIM HORMONE: CPT | Performed by: INTERNAL MEDICINE

## 2019-08-22 PROCEDURE — 25010000002 ALBUMIN HUMAN 25% PER 50 ML: Performed by: NURSE PRACTITIONER

## 2019-08-22 PROCEDURE — 85027 COMPLETE CBC AUTOMATED: CPT | Performed by: INTERNAL MEDICINE

## 2019-08-22 PROCEDURE — 84100 ASSAY OF PHOSPHORUS: CPT | Performed by: INTERNAL MEDICINE

## 2019-08-22 PROCEDURE — 99232 SBSQ HOSP IP/OBS MODERATE 35: CPT | Performed by: NURSE PRACTITIONER

## 2019-08-22 PROCEDURE — 87205 SMEAR GRAM STAIN: CPT | Performed by: INTERNAL MEDICINE

## 2019-08-22 PROCEDURE — 83615 LACTATE (LD) (LDH) ENZYME: CPT | Performed by: NURSE PRACTITIONER

## 2019-08-22 PROCEDURE — 63710000001 MYCOPHENOLATE MOFETIL PER 250 MG: Performed by: INTERNAL MEDICINE

## 2019-08-22 PROCEDURE — 84484 ASSAY OF TROPONIN QUANT: CPT | Performed by: NURSE PRACTITIONER

## 2019-08-22 PROCEDURE — 82150 ASSAY OF AMYLASE: CPT | Performed by: NURSE PRACTITIONER

## 2019-08-22 PROCEDURE — 85610 PROTHROMBIN TIME: CPT | Performed by: INTERNAL MEDICINE

## 2019-08-22 PROCEDURE — 89051 BODY FLUID CELL COUNT: CPT | Performed by: NURSE PRACTITIONER

## 2019-08-22 PROCEDURE — 87070 CULTURE OTHR SPECIMN AEROBIC: CPT | Performed by: INTERNAL MEDICINE

## 2019-08-22 PROCEDURE — 84157 ASSAY OF PROTEIN OTHER: CPT | Performed by: NURSE PRACTITIONER

## 2019-08-22 PROCEDURE — 25010000002 IRON SUCROSE PER 1 MG: Performed by: INTERNAL MEDICINE

## 2019-08-22 PROCEDURE — 82945 GLUCOSE OTHER FLUID: CPT | Performed by: NURSE PRACTITIONER

## 2019-08-22 PROCEDURE — 82247 BILIRUBIN TOTAL: CPT | Performed by: NURSE PRACTITIONER

## 2019-08-22 PROCEDURE — 83540 ASSAY OF IRON: CPT | Performed by: INTERNAL MEDICINE

## 2019-08-22 PROCEDURE — 25010000002 FONDAPARINUX PER 0.5 MG: Performed by: NURSE PRACTITIONER

## 2019-08-22 PROCEDURE — 25010000002 ALBUMIN HUMAN 25% PER 50 ML: Performed by: INTERNAL MEDICINE

## 2019-08-22 PROCEDURE — 82330 ASSAY OF CALCIUM: CPT | Performed by: INTERNAL MEDICINE

## 2019-08-22 PROCEDURE — 63710000001 PREDNISONE PER 1 MG: Performed by: INTERNAL MEDICINE

## 2019-08-22 PROCEDURE — 99233 SBSQ HOSP IP/OBS HIGH 50: CPT | Performed by: INTERNAL MEDICINE

## 2019-08-22 PROCEDURE — 80053 COMPREHEN METABOLIC PANEL: CPT | Performed by: INTERNAL MEDICINE

## 2019-08-22 PROCEDURE — 87015 SPECIMEN INFECT AGNT CONCNTJ: CPT | Performed by: INTERNAL MEDICINE

## 2019-08-22 PROCEDURE — 83735 ASSAY OF MAGNESIUM: CPT | Performed by: INTERNAL MEDICINE

## 2019-08-22 PROCEDURE — 82042 OTHER SOURCE ALBUMIN QUAN EA: CPT | Performed by: NURSE PRACTITIONER

## 2019-08-22 PROCEDURE — P9047 ALBUMIN (HUMAN), 25%, 50ML: HCPCS | Performed by: NURSE PRACTITIONER

## 2019-08-22 PROCEDURE — P9047 ALBUMIN (HUMAN), 25%, 50ML: HCPCS | Performed by: INTERNAL MEDICINE

## 2019-08-22 PROCEDURE — 88108 CYTOPATH CONCENTRATE TECH: CPT | Performed by: NURSE PRACTITIONER

## 2019-08-22 RX ORDER — BUMETANIDE 0.25 MG/ML
1 INJECTION INTRAMUSCULAR; INTRAVENOUS EVERY 8 HOURS
Status: DISPENSED | OUTPATIENT
Start: 2019-08-22 | End: 2019-08-23

## 2019-08-22 RX ORDER — BUPRENORPHINE HYDROCHLORIDE 8 MG/1
8 TABLET SUBLINGUAL 3 TIMES DAILY
Status: DISCONTINUED | OUTPATIENT
Start: 2019-08-22 | End: 2019-08-28

## 2019-08-22 RX ORDER — CALCIUM GLUCONATE 20 MG/ML
1 INJECTION, SOLUTION INTRAVENOUS 2 TIMES DAILY
Status: DISPENSED | OUTPATIENT
Start: 2019-08-22 | End: 2019-08-23

## 2019-08-22 RX ORDER — BUPRENORPHINE HYDROCHLORIDE 8 MG/1
8 TABLET SUBLINGUAL 3 TIMES DAILY
Status: DISCONTINUED | OUTPATIENT
Start: 2019-08-23 | End: 2019-08-22

## 2019-08-22 RX ORDER — ALBUMIN (HUMAN) 12.5 G/50ML
25 SOLUTION INTRAVENOUS EVERY 8 HOURS
Status: DISCONTINUED | OUTPATIENT
Start: 2019-08-22 | End: 2019-08-22

## 2019-08-22 RX ADMIN — QUETIAPINE 50 MG: 25 TABLET, FILM COATED ORAL at 21:05

## 2019-08-22 RX ADMIN — FONDAPARINUX SODIUM 7.5 MG: 7.5 INJECTION, SOLUTION SUBCUTANEOUS at 21:07

## 2019-08-22 RX ADMIN — GABAPENTIN 400 MG: 400 CAPSULE ORAL at 21:06

## 2019-08-22 RX ADMIN — BUMETANIDE 1 MG: 0.25 INJECTION INTRAMUSCULAR; INTRAVENOUS at 03:30

## 2019-08-22 RX ADMIN — ALBUMIN HUMAN 25 G: 0.25 SOLUTION INTRAVENOUS at 01:40

## 2019-08-22 RX ADMIN — AMLODIPINE BESYLATE 10 MG: 5 TABLET ORAL at 09:44

## 2019-08-22 RX ADMIN — BUMETANIDE 1 MG: 0.25 INJECTION INTRAMUSCULAR; INTRAVENOUS at 21:05

## 2019-08-22 RX ADMIN — FAMOTIDINE 20 MG: 20 TABLET, FILM COATED ORAL at 09:44

## 2019-08-22 RX ADMIN — FOLIC ACID 1 MG: 1 TABLET ORAL at 09:44

## 2019-08-22 RX ADMIN — CLONAZEPAM 0.5 MG: 0.5 TABLET ORAL at 21:06

## 2019-08-22 RX ADMIN — GABAPENTIN 400 MG: 400 CAPSULE ORAL at 09:42

## 2019-08-22 RX ADMIN — CALCIUM GLUCONATE 50 ML: 20 INJECTION, SOLUTION INTRAVENOUS at 21:08

## 2019-08-22 RX ADMIN — LACOSAMIDE 100 MG: 100 TABLET, FILM COATED ORAL at 21:05

## 2019-08-22 RX ADMIN — LEVETIRACETAM 500 MG: 500 TABLET, FILM COATED ORAL at 09:44

## 2019-08-22 RX ADMIN — BUPRENORPHINE HCL 8 MG: 8 TABLET SUBLINGUAL at 22:42

## 2019-08-22 RX ADMIN — OYSTER SHELL CALCIUM WITH VITAMIN D 1 TABLET: 500; 200 TABLET, FILM COATED ORAL at 09:45

## 2019-08-22 RX ADMIN — IRON SUCROSE 200 MG: 20 INJECTION, SOLUTION INTRAVENOUS at 21:10

## 2019-08-22 RX ADMIN — LEVETIRACETAM 500 MG: 500 TABLET, FILM COATED ORAL at 21:07

## 2019-08-22 RX ADMIN — PREDNISONE 40 MG: 20 TABLET ORAL at 09:44

## 2019-08-22 RX ADMIN — OYSTER SHELL CALCIUM WITH VITAMIN D 1 TABLET: 500; 200 TABLET, FILM COATED ORAL at 21:07

## 2019-08-22 RX ADMIN — CLONIDINE HYDROCHLORIDE 0.1 MG: 0.1 TABLET ORAL at 21:05

## 2019-08-22 RX ADMIN — CLONIDINE HYDROCHLORIDE 0.1 MG: 0.1 TABLET ORAL at 09:44

## 2019-08-22 RX ADMIN — LACOSAMIDE 100 MG: 100 TABLET, FILM COATED ORAL at 09:44

## 2019-08-22 RX ADMIN — SERTRALINE HYDROCHLORIDE 50 MG: 50 TABLET ORAL at 09:44

## 2019-08-22 RX ADMIN — ALBUMIN HUMAN 62.5 G: 0.25 SOLUTION INTRAVENOUS at 18:39

## 2019-08-22 RX ADMIN — CARVEDILOL 25 MG: 25 TABLET, FILM COATED ORAL at 09:42

## 2019-08-22 RX ADMIN — Medication 10 ML: at 21:08

## 2019-08-22 RX ADMIN — CLONAZEPAM 0.5 MG: 0.5 TABLET ORAL at 09:44

## 2019-08-22 RX ADMIN — MYCOPHENOLATE MOFETIL 500 MG: 250 CAPSULE ORAL at 21:06

## 2019-08-22 RX ADMIN — CYANOCOBALAMIN TAB 250 MCG 500 MCG: 250 TAB at 09:44

## 2019-08-22 RX ADMIN — MYCOPHENOLATE MOFETIL 500 MG: 250 CAPSULE ORAL at 09:44

## 2019-08-23 LAB
ALBUMIN SERPL-MCNC: 2.9 G/DL (ref 3.5–4.8)
ALBUMIN/GLOB SERPL: 0.8 G/DL (ref 1–1.7)
ALP SERPL-CCNC: 111 U/L (ref 32–91)
ALT SERPL W P-5'-P-CCNC: 20 U/L (ref 17–63)
ANION GAP SERPL CALCULATED.3IONS-SCNC: 19.4 MMOL/L (ref 5–15)
AST SERPL-CCNC: 14 U/L (ref 15–41)
BACTERIA UR QL AUTO: ABNORMAL /HPF
BILIRUB SERPL-MCNC: 0.3 MG/DL (ref 0.3–1.2)
BILIRUB UR QL STRIP: NEGATIVE
BUN BLD-MCNC: 97 MG/DL (ref 8–20)
BUN/CREAT SERPL: 33.4 (ref 6.2–20.3)
C3 SERPL-MCNC: 72.6 MG/DL (ref 79–152)
C4 SERPL-MCNC: 10.4 MG/DL (ref 18–55)
CA-I SERPL ISE-MCNC: 1.18 MMOL/L (ref 1.2–1.3)
CALCIUM SPEC-SCNC: 8.6 MG/DL (ref 8.9–10.3)
CHLORIDE SERPL-SCNC: 97 MMOL/L (ref 101–111)
CLARITY UR: ABNORMAL
CO2 SERPL-SCNC: 26 MMOL/L (ref 22–32)
COLOR UR: YELLOW
CREAT BLD-MCNC: 2.9 MG/DL (ref 0.7–1.2)
DEPRECATED RDW RBC AUTO: 54.7 FL (ref 37–54)
ERYTHROCYTE [DISTWIDTH] IN BLOOD BY AUTOMATED COUNT: 17.5 % (ref 12.3–15.4)
GFR SERPL CREATININE-BSD FRML MDRD: 25 ML/MIN/1.73
GLOBULIN UR ELPH-MCNC: 3.7 GM/DL (ref 2.5–3.8)
GLUCOSE BLD-MCNC: 123 MG/DL (ref 65–99)
GLUCOSE UR STRIP-MCNC: NEGATIVE MG/DL
HCT VFR BLD AUTO: 28.6 % (ref 37.5–51)
HGB BLD-MCNC: 8.9 G/DL (ref 13–17.7)
HGB UR QL STRIP.AUTO: ABNORMAL
HYALINE CASTS UR QL AUTO: ABNORMAL /LPF
INR PPP: 1.3 (ref 2–3)
KETONES UR QL STRIP: NEGATIVE
LEUKOCYTE ESTERASE UR QL STRIP.AUTO: NEGATIVE
MAGNESIUM SERPL-MCNC: 2.7 MG/DL (ref 1.8–2.5)
MCH RBC QN AUTO: 27.1 PG (ref 26.6–33)
MCHC RBC AUTO-ENTMCNC: 31.1 G/DL (ref 31.5–35.7)
MCV RBC AUTO: 87.1 FL (ref 79–97)
NITRITE UR QL STRIP: NEGATIVE
PH UR STRIP.AUTO: 5.5 [PH] (ref 5–8)
PHOSPHATE SERPL-MCNC: 7.7 MG/DL (ref 2.4–4.7)
PLATELET # BLD AUTO: 43 10*3/MM3 (ref 140–450)
PMV BLD AUTO: 9.2 FL (ref 6–12)
POTASSIUM BLD-SCNC: 4.4 MMOL/L (ref 3.6–5.1)
PROT SERPL-MCNC: 6.6 G/DL (ref 6.1–7.9)
PROT UR QL STRIP: ABNORMAL
PROTHROMBIN TIME: 12.9 SECONDS (ref 19.4–28.5)
RBC # BLD AUTO: 3.29 10*6/MM3 (ref 4.14–5.8)
RBC # UR: ABNORMAL /HPF
REF LAB TEST METHOD: ABNORMAL
SODIUM BLD-SCNC: 138 MMOL/L (ref 136–144)
SP GR UR STRIP: 1.02 (ref 1–1.03)
SQUAMOUS #/AREA URNS HPF: ABNORMAL /HPF
TROPONIN I SERPL-MCNC: <0.03 NG/ML (ref 0–0.03)
UROBILINOGEN UR QL STRIP: ABNORMAL
WBC NRBC COR # BLD: 3.7 10*3/MM3 (ref 3.4–10.8)
WBC UR QL AUTO: ABNORMAL /HPF

## 2019-08-23 PROCEDURE — 84484 ASSAY OF TROPONIN QUANT: CPT | Performed by: NURSE PRACTITIONER

## 2019-08-23 PROCEDURE — 80053 COMPREHEN METABOLIC PANEL: CPT | Performed by: INTERNAL MEDICINE

## 2019-08-23 PROCEDURE — 85027 COMPLETE CBC AUTOMATED: CPT | Performed by: INTERNAL MEDICINE

## 2019-08-23 PROCEDURE — 81001 URINALYSIS AUTO W/SCOPE: CPT | Performed by: INTERNAL MEDICINE

## 2019-08-23 PROCEDURE — 80177 DRUG SCRN QUAN LEVETIRACETAM: CPT | Performed by: NURSE PRACTITIONER

## 2019-08-23 PROCEDURE — 99232 SBSQ HOSP IP/OBS MODERATE 35: CPT | Performed by: INTERNAL MEDICINE

## 2019-08-23 PROCEDURE — 25010000002 IRON SUCROSE PER 1 MG: Performed by: INTERNAL MEDICINE

## 2019-08-23 PROCEDURE — 25010000002 ALBUMIN HUMAN 25% PER 50 ML: Performed by: INTERNAL MEDICINE

## 2019-08-23 PROCEDURE — 63710000001 PREDNISONE PER 1 MG: Performed by: INTERNAL MEDICINE

## 2019-08-23 PROCEDURE — 84100 ASSAY OF PHOSPHORUS: CPT | Performed by: INTERNAL MEDICINE

## 2019-08-23 PROCEDURE — 63710000001 MYCOPHENOLATE MOFETIL PER 250 MG: Performed by: INTERNAL MEDICINE

## 2019-08-23 PROCEDURE — P9047 ALBUMIN (HUMAN), 25%, 50ML: HCPCS | Performed by: INTERNAL MEDICINE

## 2019-08-23 PROCEDURE — 25010000002 FONDAPARINUX PER 0.5 MG: Performed by: NURSE PRACTITIONER

## 2019-08-23 PROCEDURE — 82330 ASSAY OF CALCIUM: CPT | Performed by: INTERNAL MEDICINE

## 2019-08-23 PROCEDURE — 84484 ASSAY OF TROPONIN QUANT: CPT | Performed by: INTERNAL MEDICINE

## 2019-08-23 PROCEDURE — 85610 PROTHROMBIN TIME: CPT | Performed by: INTERNAL MEDICINE

## 2019-08-23 PROCEDURE — 83735 ASSAY OF MAGNESIUM: CPT | Performed by: INTERNAL MEDICINE

## 2019-08-23 RX ORDER — ONDANSETRON 2 MG/ML
4 INJECTION INTRAMUSCULAR; INTRAVENOUS EVERY 6 HOURS PRN
Status: DISCONTINUED | OUTPATIENT
Start: 2019-08-23 | End: 2019-08-30 | Stop reason: HOSPADM

## 2019-08-23 RX ORDER — BUMETANIDE 0.25 MG/ML
0.5 INJECTION INTRAMUSCULAR; INTRAVENOUS EVERY 8 HOURS
Status: COMPLETED | OUTPATIENT
Start: 2019-08-23 | End: 2019-08-24

## 2019-08-23 RX ORDER — CARVEDILOL 6.25 MG/1
12.5 TABLET ORAL 2 TIMES DAILY WITH MEALS
Status: DISCONTINUED | OUTPATIENT
Start: 2019-08-23 | End: 2019-08-30

## 2019-08-23 RX ORDER — ALBUMIN (HUMAN) 12.5 G/50ML
25 SOLUTION INTRAVENOUS EVERY 8 HOURS
Status: COMPLETED | OUTPATIENT
Start: 2019-08-23 | End: 2019-08-24

## 2019-08-23 RX ORDER — CALCIUM ACETATE 667 MG/1
1334 CAPSULE ORAL
Status: DISCONTINUED | OUTPATIENT
Start: 2019-08-23 | End: 2019-08-27

## 2019-08-23 RX ORDER — ONDANSETRON 4 MG/1
4 TABLET, FILM COATED ORAL EVERY 6 HOURS PRN
Status: DISCONTINUED | OUTPATIENT
Start: 2019-08-23 | End: 2019-08-30 | Stop reason: HOSPADM

## 2019-08-23 RX ORDER — CHOLECALCIFEROL (VITAMIN D3) 125 MCG
5 CAPSULE ORAL NIGHTLY PRN
Status: DISCONTINUED | OUTPATIENT
Start: 2019-08-23 | End: 2019-08-30 | Stop reason: HOSPADM

## 2019-08-23 RX ORDER — BISACODYL 10 MG
10 SUPPOSITORY, RECTAL RECTAL DAILY PRN
Status: DISCONTINUED | OUTPATIENT
Start: 2019-08-23 | End: 2019-08-30 | Stop reason: HOSPADM

## 2019-08-23 RX ORDER — SODIUM CHLORIDE 0.9 % (FLUSH) 0.9 %
3-10 SYRINGE (ML) INJECTION AS NEEDED
Status: DISCONTINUED | OUTPATIENT
Start: 2019-08-23 | End: 2019-08-30 | Stop reason: HOSPADM

## 2019-08-23 RX ORDER — ALUMINA, MAGNESIA, AND SIMETHICONE 2400; 2400; 240 MG/30ML; MG/30ML; MG/30ML
15 SUSPENSION ORAL EVERY 6 HOURS PRN
Status: DISCONTINUED | OUTPATIENT
Start: 2019-08-23 | End: 2019-08-23

## 2019-08-23 RX ORDER — SODIUM CHLORIDE 0.9 % (FLUSH) 0.9 %
3 SYRINGE (ML) INJECTION EVERY 12 HOURS SCHEDULED
Status: DISCONTINUED | OUTPATIENT
Start: 2019-08-23 | End: 2019-08-30 | Stop reason: HOSPADM

## 2019-08-23 RX ORDER — BUMETANIDE 0.25 MG/ML
0.5 INJECTION INTRAMUSCULAR; INTRAVENOUS EVERY 8 HOURS
Status: DISCONTINUED | OUTPATIENT
Start: 2019-08-23 | End: 2019-08-23

## 2019-08-23 RX ADMIN — BUMETANIDE 1 MG: 0.25 INJECTION INTRAMUSCULAR; INTRAVENOUS at 04:28

## 2019-08-23 RX ADMIN — LACOSAMIDE 100 MG: 100 TABLET, FILM COATED ORAL at 20:35

## 2019-08-23 RX ADMIN — CALCIUM ACETATE 1334 MG: 667 CAPSULE ORAL at 09:16

## 2019-08-23 RX ADMIN — OYSTER SHELL CALCIUM WITH VITAMIN D 1 TABLET: 500; 200 TABLET, FILM COATED ORAL at 20:35

## 2019-08-23 RX ADMIN — BUPRENORPHINE HCL 8 MG: 8 TABLET SUBLINGUAL at 20:34

## 2019-08-23 RX ADMIN — Medication 3 ML: at 20:49

## 2019-08-23 RX ADMIN — BUPRENORPHINE HCL 8 MG: 8 TABLET SUBLINGUAL at 17:36

## 2019-08-23 RX ADMIN — GABAPENTIN 400 MG: 400 CAPSULE ORAL at 17:36

## 2019-08-23 RX ADMIN — BUMETANIDE 0.5 MG: 0.25 INJECTION INTRAMUSCULAR; INTRAVENOUS at 20:33

## 2019-08-23 RX ADMIN — QUETIAPINE 50 MG: 25 TABLET, FILM COATED ORAL at 20:34

## 2019-08-23 RX ADMIN — GABAPENTIN 400 MG: 400 CAPSULE ORAL at 07:51

## 2019-08-23 RX ADMIN — CLONAZEPAM 0.5 MG: 0.5 TABLET ORAL at 07:51

## 2019-08-23 RX ADMIN — LACOSAMIDE 100 MG: 100 TABLET, FILM COATED ORAL at 07:51

## 2019-08-23 RX ADMIN — FONDAPARINUX SODIUM 7.5 MG: 7.5 INJECTION, SOLUTION SUBCUTANEOUS at 20:35

## 2019-08-23 RX ADMIN — CYANOCOBALAMIN TAB 250 MCG 500 MCG: 250 TAB at 07:52

## 2019-08-23 RX ADMIN — PREDNISONE 40 MG: 20 TABLET ORAL at 07:52

## 2019-08-23 RX ADMIN — BUPRENORPHINE HCL 8 MG: 8 TABLET SUBLINGUAL at 07:50

## 2019-08-23 RX ADMIN — ALBUMIN HUMAN 25 G: 0.25 SOLUTION INTRAVENOUS at 18:56

## 2019-08-23 RX ADMIN — CLONAZEPAM 0.5 MG: 0.5 TABLET ORAL at 20:35

## 2019-08-23 RX ADMIN — ALBUMIN HUMAN 25 G: 0.25 SOLUTION INTRAVENOUS at 09:16

## 2019-08-23 RX ADMIN — OYSTER SHELL CALCIUM WITH VITAMIN D 1 TABLET: 500; 200 TABLET, FILM COATED ORAL at 07:52

## 2019-08-23 RX ADMIN — FAMOTIDINE 20 MG: 20 TABLET, FILM COATED ORAL at 07:51

## 2019-08-23 RX ADMIN — GABAPENTIN 400 MG: 400 CAPSULE ORAL at 20:35

## 2019-08-23 RX ADMIN — FOLIC ACID 1 MG: 1 TABLET ORAL at 07:51

## 2019-08-23 RX ADMIN — CALCIUM ACETATE 1334 MG: 667 CAPSULE ORAL at 12:18

## 2019-08-23 RX ADMIN — IRON SUCROSE 200 MG: 20 INJECTION, SOLUTION INTRAVENOUS at 04:28

## 2019-08-23 RX ADMIN — LEVETIRACETAM 500 MG: 500 TABLET, FILM COATED ORAL at 07:51

## 2019-08-23 RX ADMIN — CARVEDILOL 12.5 MG: 6.25 TABLET, FILM COATED ORAL at 17:40

## 2019-08-23 RX ADMIN — CALCIUM ACETATE 1334 MG: 667 CAPSULE ORAL at 17:36

## 2019-08-23 RX ADMIN — SERTRALINE HYDROCHLORIDE 50 MG: 50 TABLET ORAL at 07:51

## 2019-08-23 RX ADMIN — LEVETIRACETAM 500 MG: 500 TABLET, FILM COATED ORAL at 20:35

## 2019-08-23 RX ADMIN — CARVEDILOL 12.5 MG: 6.25 TABLET, FILM COATED ORAL at 09:15

## 2019-08-23 RX ADMIN — MYCOPHENOLATE MOFETIL 500 MG: 250 CAPSULE ORAL at 20:34

## 2019-08-23 RX ADMIN — MYCOPHENOLATE MOFETIL 500 MG: 250 CAPSULE ORAL at 07:52

## 2019-08-23 RX ADMIN — BUMETANIDE 0.5 MG: 0.25 INJECTION INTRAMUSCULAR; INTRAVENOUS at 10:18

## 2019-08-23 RX ADMIN — GABAPENTIN 400 MG: 400 CAPSULE ORAL at 12:18

## 2019-08-24 LAB
ABO GROUP BLD: NORMAL
ALBUMIN SERPL-MCNC: 3.2 G/DL (ref 3.5–4.8)
ALBUMIN/GLOB SERPL: 1.1 G/DL (ref 1–1.7)
ALP SERPL-CCNC: 94 U/L (ref 32–91)
ALT SERPL W P-5'-P-CCNC: 15 U/L (ref 17–63)
ANION GAP SERPL CALCULATED.3IONS-SCNC: 18.1 MMOL/L (ref 5–15)
AST SERPL-CCNC: 12 U/L (ref 15–41)
BILIRUB SERPL-MCNC: 0.6 MG/DL (ref 0.3–1.2)
BLD GP AB SCN SERPL QL: NEGATIVE
BUN BLD-MCNC: 106 MG/DL (ref 8–20)
BUN/CREAT SERPL: 35.3 (ref 6.2–20.3)
CA-I SERPL ISE-MCNC: 1.13 MMOL/L (ref 1.2–1.3)
CALCIUM SPEC-SCNC: 8.1 MG/DL (ref 8.9–10.3)
CHLORIDE SERPL-SCNC: 97 MMOL/L (ref 101–111)
CO2 SERPL-SCNC: 25 MMOL/L (ref 22–32)
CREAT BLD-MCNC: 3 MG/DL (ref 0.7–1.2)
DEPRECATED RDW RBC AUTO: 54.7 FL (ref 37–54)
ERYTHROCYTE [DISTWIDTH] IN BLOOD BY AUTOMATED COUNT: 17.3 % (ref 12.3–15.4)
GFR SERPL CREATININE-BSD FRML MDRD: 24 ML/MIN/1.73
GLOBULIN UR ELPH-MCNC: 2.9 GM/DL (ref 2.5–3.8)
GLUCOSE BLD-MCNC: 101 MG/DL (ref 65–99)
HCT VFR BLD AUTO: 22.9 % (ref 37.5–51)
HGB BLD-MCNC: 7.3 G/DL (ref 13–17.7)
MAGNESIUM SERPL-MCNC: 2.7 MG/DL (ref 1.8–2.5)
MCH RBC QN AUTO: 28.2 PG (ref 26.6–33)
MCHC RBC AUTO-ENTMCNC: 32.1 G/DL (ref 31.5–35.7)
MCV RBC AUTO: 87.8 FL (ref 79–97)
PHOSPHATE SERPL-MCNC: 6.1 MG/DL (ref 2.4–4.7)
PLATELET # BLD AUTO: 31 10*3/MM3 (ref 140–450)
PMV BLD AUTO: 8.1 FL (ref 6–12)
POTASSIUM BLD-SCNC: 4.1 MMOL/L (ref 3.6–5.1)
PROT SERPL-MCNC: 6.1 G/DL (ref 6.1–7.9)
RBC # BLD AUTO: 2.6 10*6/MM3 (ref 4.14–5.8)
RH BLD: POSITIVE
SODIUM BLD-SCNC: 136 MMOL/L (ref 136–144)
T&S EXPIRATION DATE: NORMAL
TROPONIN I SERPL-MCNC: <0.03 NG/ML (ref 0–0.03)
WBC NRBC COR # BLD: 4.9 10*3/MM3 (ref 3.4–10.8)

## 2019-08-24 PROCEDURE — 83735 ASSAY OF MAGNESIUM: CPT | Performed by: INTERNAL MEDICINE

## 2019-08-24 PROCEDURE — 63710000001 MYCOPHENOLATE MOFETIL PER 250 MG: Performed by: INTERNAL MEDICINE

## 2019-08-24 PROCEDURE — 82330 ASSAY OF CALCIUM: CPT | Performed by: INTERNAL MEDICINE

## 2019-08-24 PROCEDURE — 84484 ASSAY OF TROPONIN QUANT: CPT | Performed by: INTERNAL MEDICINE

## 2019-08-24 PROCEDURE — 63710000001 PREDNISONE PER 1 MG: Performed by: INTERNAL MEDICINE

## 2019-08-24 PROCEDURE — 86900 BLOOD TYPING SEROLOGIC ABO: CPT

## 2019-08-24 PROCEDURE — P9047 ALBUMIN (HUMAN), 25%, 50ML: HCPCS | Performed by: INTERNAL MEDICINE

## 2019-08-24 PROCEDURE — 25010000002 DIPHENHYDRAMINE PER 50 MG: Performed by: INTERNAL MEDICINE

## 2019-08-24 PROCEDURE — 86901 BLOOD TYPING SEROLOGIC RH(D): CPT

## 2019-08-24 PROCEDURE — 36430 TRANSFUSION BLD/BLD COMPNT: CPT

## 2019-08-24 PROCEDURE — 99232 SBSQ HOSP IP/OBS MODERATE 35: CPT | Performed by: INTERNAL MEDICINE

## 2019-08-24 PROCEDURE — P9016 RBC LEUKOCYTES REDUCED: HCPCS

## 2019-08-24 PROCEDURE — 25010000002 FONDAPARINUX PER 0.5 MG: Performed by: NURSE PRACTITIONER

## 2019-08-24 PROCEDURE — 25010000002 IRON SUCROSE PER 1 MG: Performed by: INTERNAL MEDICINE

## 2019-08-24 PROCEDURE — 86901 BLOOD TYPING SEROLOGIC RH(D): CPT | Performed by: INTERNAL MEDICINE

## 2019-08-24 PROCEDURE — 85027 COMPLETE CBC AUTOMATED: CPT | Performed by: INTERNAL MEDICINE

## 2019-08-24 PROCEDURE — 86850 RBC ANTIBODY SCREEN: CPT | Performed by: INTERNAL MEDICINE

## 2019-08-24 PROCEDURE — 80053 COMPREHEN METABOLIC PANEL: CPT | Performed by: INTERNAL MEDICINE

## 2019-08-24 PROCEDURE — 25010000002 ALBUMIN HUMAN 25% PER 50 ML: Performed by: INTERNAL MEDICINE

## 2019-08-24 PROCEDURE — 86922 COMPATIBILITY TEST ANTIGLOB: CPT

## 2019-08-24 PROCEDURE — 86900 BLOOD TYPING SEROLOGIC ABO: CPT | Performed by: INTERNAL MEDICINE

## 2019-08-24 PROCEDURE — 84100 ASSAY OF PHOSPHORUS: CPT | Performed by: INTERNAL MEDICINE

## 2019-08-24 RX ORDER — ACETAMINOPHEN 325 MG/1
650 TABLET ORAL ONCE
Status: COMPLETED | OUTPATIENT
Start: 2019-08-24 | End: 2019-08-24

## 2019-08-24 RX ORDER — LIDOCAINE 50 MG/G
1 PATCH TOPICAL
Status: DISCONTINUED | OUTPATIENT
Start: 2019-08-24 | End: 2019-08-30 | Stop reason: HOSPADM

## 2019-08-24 RX ORDER — DIPHENHYDRAMINE HYDROCHLORIDE 50 MG/ML
12.5 INJECTION INTRAMUSCULAR; INTRAVENOUS ONCE
Status: COMPLETED | OUTPATIENT
Start: 2019-08-24 | End: 2019-08-24

## 2019-08-24 RX ADMIN — LACOSAMIDE 100 MG: 100 TABLET, FILM COATED ORAL at 09:11

## 2019-08-24 RX ADMIN — BUPRENORPHINE HCL 8 MG: 8 TABLET SUBLINGUAL at 22:37

## 2019-08-24 RX ADMIN — LEVETIRACETAM 500 MG: 500 TABLET, FILM COATED ORAL at 09:11

## 2019-08-24 RX ADMIN — CALCIUM ACETATE 1334 MG: 667 CAPSULE ORAL at 13:00

## 2019-08-24 RX ADMIN — CYANOCOBALAMIN TAB 250 MCG 500 MCG: 250 TAB at 09:11

## 2019-08-24 RX ADMIN — ALBUMIN HUMAN 25 G: 0.25 SOLUTION INTRAVENOUS at 00:12

## 2019-08-24 RX ADMIN — GABAPENTIN 400 MG: 400 CAPSULE ORAL at 09:12

## 2019-08-24 RX ADMIN — Medication 3 ML: at 21:23

## 2019-08-24 RX ADMIN — OYSTER SHELL CALCIUM WITH VITAMIN D 1 TABLET: 500; 200 TABLET, FILM COATED ORAL at 09:11

## 2019-08-24 RX ADMIN — BUPRENORPHINE HCL 8 MG: 8 TABLET SUBLINGUAL at 09:11

## 2019-08-24 RX ADMIN — MYCOPHENOLATE MOFETIL 500 MG: 250 CAPSULE ORAL at 21:19

## 2019-08-24 RX ADMIN — BUMETANIDE 0.5 MG: 0.25 INJECTION INTRAMUSCULAR; INTRAVENOUS at 01:13

## 2019-08-24 RX ADMIN — GABAPENTIN 400 MG: 400 CAPSULE ORAL at 13:00

## 2019-08-24 RX ADMIN — BUPRENORPHINE HCL 8 MG: 8 TABLET SUBLINGUAL at 16:51

## 2019-08-24 RX ADMIN — GABAPENTIN 400 MG: 400 CAPSULE ORAL at 21:19

## 2019-08-24 RX ADMIN — MYCOPHENOLATE MOFETIL 500 MG: 250 CAPSULE ORAL at 09:16

## 2019-08-24 RX ADMIN — CALCIUM ACETATE 1334 MG: 667 CAPSULE ORAL at 16:50

## 2019-08-24 RX ADMIN — ACETAMINOPHEN 650 MG: 325 TABLET ORAL at 08:42

## 2019-08-24 RX ADMIN — QUETIAPINE 50 MG: 25 TABLET, FILM COATED ORAL at 21:20

## 2019-08-24 RX ADMIN — GABAPENTIN 400 MG: 400 CAPSULE ORAL at 16:51

## 2019-08-24 RX ADMIN — SERTRALINE HYDROCHLORIDE 50 MG: 50 TABLET ORAL at 09:11

## 2019-08-24 RX ADMIN — CARVEDILOL 12.5 MG: 6.25 TABLET, FILM COATED ORAL at 09:00

## 2019-08-24 RX ADMIN — CLONAZEPAM 0.5 MG: 0.5 TABLET ORAL at 09:11

## 2019-08-24 RX ADMIN — CLONAZEPAM 0.5 MG: 0.5 TABLET ORAL at 21:19

## 2019-08-24 RX ADMIN — FOLIC ACID 1 MG: 1 TABLET ORAL at 09:11

## 2019-08-24 RX ADMIN — CALCIUM ACETATE 1334 MG: 667 CAPSULE ORAL at 09:10

## 2019-08-24 RX ADMIN — LIDOCAINE 1 PATCH: 50 PATCH CUTANEOUS at 13:00

## 2019-08-24 RX ADMIN — OYSTER SHELL CALCIUM WITH VITAMIN D 1 TABLET: 500; 200 TABLET, FILM COATED ORAL at 21:19

## 2019-08-24 RX ADMIN — DIPHENHYDRAMINE HYDROCHLORIDE 12.5 MG: 50 INJECTION, SOLUTION INTRAMUSCULAR; INTRAVENOUS at 08:42

## 2019-08-24 RX ADMIN — FONDAPARINUX SODIUM 7.5 MG: 7.5 INJECTION, SOLUTION SUBCUTANEOUS at 21:20

## 2019-08-24 RX ADMIN — FAMOTIDINE 20 MG: 20 TABLET, FILM COATED ORAL at 09:11

## 2019-08-24 RX ADMIN — PREDNISONE 40 MG: 20 TABLET ORAL at 09:10

## 2019-08-24 RX ADMIN — Medication 3 ML: at 09:16

## 2019-08-24 RX ADMIN — LEVETIRACETAM 500 MG: 500 TABLET, FILM COATED ORAL at 21:19

## 2019-08-24 RX ADMIN — IRON SUCROSE 200 MG: 20 INJECTION, SOLUTION INTRAVENOUS at 04:03

## 2019-08-24 RX ADMIN — LACOSAMIDE 100 MG: 100 TABLET, FILM COATED ORAL at 21:19

## 2019-08-25 LAB
ALBUMIN SERPL-MCNC: 2.9 G/DL (ref 3.5–4.8)
ALBUMIN/GLOB SERPL: 1 G/DL (ref 1–1.7)
ALP SERPL-CCNC: 95 U/L (ref 32–91)
ALT SERPL W P-5'-P-CCNC: 16 U/L (ref 17–63)
AMMONIA BLD-SCNC: 34 UMOL/L (ref 9–35)
ANION GAP SERPL CALCULATED.3IONS-SCNC: 18.4 MMOL/L (ref 5–15)
AST SERPL-CCNC: 12 U/L (ref 15–41)
BACTERIA FLD CULT: NORMAL
BILIRUB SERPL-MCNC: 0.5 MG/DL (ref 0.3–1.2)
BUN BLD-MCNC: 106 MG/DL (ref 8–20)
BUN/CREAT SERPL: 42.4 (ref 6.2–20.3)
CA-I SERPL ISE-MCNC: 1.18 MMOL/L (ref 1.2–1.3)
CALCIUM SPEC-SCNC: 8.4 MG/DL (ref 8.9–10.3)
CHLORIDE SERPL-SCNC: 100 MMOL/L (ref 101–111)
CO2 SERPL-SCNC: 25 MMOL/L (ref 22–32)
CREAT BLD-MCNC: 2.5 MG/DL (ref 0.7–1.2)
DEPRECATED RDW RBC AUTO: 52.1 FL (ref 37–54)
ERYTHROCYTE [DISTWIDTH] IN BLOOD BY AUTOMATED COUNT: 17.1 % (ref 12.3–15.4)
FIBRINOGEN PPP-MCNC: 397 MG/DL (ref 210–450)
GFR SERPL CREATININE-BSD FRML MDRD: 29 ML/MIN/1.73
GLOBULIN UR ELPH-MCNC: 2.8 GM/DL (ref 2.5–3.8)
GLUCOSE BLD-MCNC: 117 MG/DL (ref 65–99)
GRAM STN SPEC: NORMAL
GRAM STN SPEC: NORMAL
HCT VFR BLD AUTO: 25.9 % (ref 37.5–51)
HGB BLD-MCNC: 8.3 G/DL (ref 13–17.7)
LDH SERPL-CCNC: 134 U/L (ref 98–192)
MAGNESIUM SERPL-MCNC: 2.6 MG/DL (ref 1.8–2.5)
MCH RBC QN AUTO: 27.2 PG (ref 26.6–33)
MCHC RBC AUTO-ENTMCNC: 31.8 G/DL (ref 31.5–35.7)
MCV RBC AUTO: 85.5 FL (ref 79–97)
PHOSPHATE SERPL-MCNC: 4.4 MG/DL (ref 2.4–4.7)
PLATELET # BLD AUTO: 20 10*3/MM3 (ref 140–450)
PMV BLD AUTO: 8.1 FL (ref 6–12)
POTASSIUM BLD-SCNC: 4.4 MMOL/L (ref 3.6–5.1)
PROT SERPL-MCNC: 5.7 G/DL (ref 6.1–7.9)
RBC # BLD AUTO: 3.03 10*6/MM3 (ref 4.14–5.8)
SODIUM BLD-SCNC: 139 MMOL/L (ref 136–144)
TROPONIN I SERPL-MCNC: <0.03 NG/ML (ref 0–0.03)
TROPONIN I SERPL-MCNC: <0.03 NG/ML (ref 0–0.03)
WBC NRBC COR # BLD: 5 10*3/MM3 (ref 3.4–10.8)
WHOLE BLOOD HOLD SPECIMEN: NORMAL

## 2019-08-25 PROCEDURE — 82140 ASSAY OF AMMONIA: CPT | Performed by: NURSE PRACTITIONER

## 2019-08-25 PROCEDURE — 85705 THROMBOPLASTIN INHIBITION: CPT | Performed by: INTERNAL MEDICINE

## 2019-08-25 PROCEDURE — 99233 SBSQ HOSP IP/OBS HIGH 50: CPT | Performed by: INTERNAL MEDICINE

## 2019-08-25 PROCEDURE — 86235 NUCLEAR ANTIGEN ANTIBODY: CPT | Performed by: INTERNAL MEDICINE

## 2019-08-25 PROCEDURE — 83735 ASSAY OF MAGNESIUM: CPT | Performed by: INTERNAL MEDICINE

## 2019-08-25 PROCEDURE — 25010000002 EPOETIN ALFA-EPBX 10000 UNIT/ML SOLUTION: Performed by: NURSE PRACTITIONER

## 2019-08-25 PROCEDURE — 85027 COMPLETE CBC AUTOMATED: CPT | Performed by: INTERNAL MEDICINE

## 2019-08-25 PROCEDURE — 82330 ASSAY OF CALCIUM: CPT | Performed by: INTERNAL MEDICINE

## 2019-08-25 PROCEDURE — 84100 ASSAY OF PHOSPHORUS: CPT | Performed by: INTERNAL MEDICINE

## 2019-08-25 PROCEDURE — 63710000001 PREDNISONE PER 1 MG: Performed by: INTERNAL MEDICINE

## 2019-08-25 PROCEDURE — 83615 LACTATE (LD) (LDH) ENZYME: CPT | Performed by: INTERNAL MEDICINE

## 2019-08-25 PROCEDURE — 63710000001 MYCOPHENOLATE MOFETIL PER 250 MG: Performed by: INTERNAL MEDICINE

## 2019-08-25 PROCEDURE — 25010000002 ALBUMIN HUMAN 25% PER 50 ML: Performed by: INTERNAL MEDICINE

## 2019-08-25 PROCEDURE — 86147 CARDIOLIPIN ANTIBODY EA IG: CPT | Performed by: NURSE PRACTITIONER

## 2019-08-25 PROCEDURE — 85384 FIBRINOGEN ACTIVITY: CPT | Performed by: NURSE PRACTITIONER

## 2019-08-25 PROCEDURE — 85613 RUSSELL VIPER VENOM DILUTED: CPT | Performed by: INTERNAL MEDICINE

## 2019-08-25 PROCEDURE — 86148 ANTI-PHOSPHOLIPID ANTIBODY: CPT | Performed by: NURSE PRACTITIONER

## 2019-08-25 PROCEDURE — 99223 1ST HOSP IP/OBS HIGH 75: CPT | Performed by: INTERNAL MEDICINE

## 2019-08-25 PROCEDURE — 85732 THROMBOPLASTIN TIME PARTIAL: CPT | Performed by: INTERNAL MEDICINE

## 2019-08-25 PROCEDURE — P9047 ALBUMIN (HUMAN), 25%, 50ML: HCPCS | Performed by: INTERNAL MEDICINE

## 2019-08-25 PROCEDURE — 83010 ASSAY OF HAPTOGLOBIN QUANT: CPT | Performed by: INTERNAL MEDICINE

## 2019-08-25 PROCEDURE — 85670 THROMBIN TIME PLASMA: CPT | Performed by: INTERNAL MEDICINE

## 2019-08-25 PROCEDURE — 85598 HEXAGNAL PHOSPH PLTLT NEUTRL: CPT | Performed by: INTERNAL MEDICINE

## 2019-08-25 PROCEDURE — 86225 DNA ANTIBODY NATIVE: CPT | Performed by: INTERNAL MEDICINE

## 2019-08-25 PROCEDURE — 84484 ASSAY OF TROPONIN QUANT: CPT | Performed by: INTERNAL MEDICINE

## 2019-08-25 PROCEDURE — 80053 COMPREHEN METABOLIC PANEL: CPT | Performed by: INTERNAL MEDICINE

## 2019-08-25 PROCEDURE — 86146 BETA-2 GLYCOPROTEIN ANTIBODY: CPT | Performed by: INTERNAL MEDICINE

## 2019-08-25 RX ORDER — BUMETANIDE 0.25 MG/ML
0.5 INJECTION INTRAMUSCULAR; INTRAVENOUS EVERY 8 HOURS
Status: COMPLETED | OUTPATIENT
Start: 2019-08-25 | End: 2019-08-26

## 2019-08-25 RX ORDER — PREDNISONE 20 MG/1
40 TABLET ORAL
Status: DISCONTINUED | OUTPATIENT
Start: 2019-08-26 | End: 2019-08-30 | Stop reason: HOSPADM

## 2019-08-25 RX ORDER — ALBUMIN (HUMAN) 12.5 G/50ML
25 SOLUTION INTRAVENOUS EVERY 8 HOURS
Status: COMPLETED | OUTPATIENT
Start: 2019-08-25 | End: 2019-08-26

## 2019-08-25 RX ADMIN — CALCIUM ACETATE 1334 MG: 667 CAPSULE ORAL at 11:12

## 2019-08-25 RX ADMIN — CALCIUM ACETATE 1334 MG: 667 CAPSULE ORAL at 17:32

## 2019-08-25 RX ADMIN — CARVEDILOL 12.5 MG: 6.25 TABLET, FILM COATED ORAL at 08:22

## 2019-08-25 RX ADMIN — LEVETIRACETAM 500 MG: 500 TABLET, FILM COATED ORAL at 08:24

## 2019-08-25 RX ADMIN — Medication 3 ML: at 21:10

## 2019-08-25 RX ADMIN — SERTRALINE HYDROCHLORIDE 50 MG: 50 TABLET ORAL at 08:24

## 2019-08-25 RX ADMIN — LACOSAMIDE 100 MG: 100 TABLET, FILM COATED ORAL at 08:24

## 2019-08-25 RX ADMIN — GABAPENTIN 400 MG: 400 CAPSULE ORAL at 17:32

## 2019-08-25 RX ADMIN — ALBUMIN HUMAN 25 G: 0.25 SOLUTION INTRAVENOUS at 11:13

## 2019-08-25 RX ADMIN — BUPRENORPHINE HCL 8 MG: 8 TABLET SUBLINGUAL at 21:08

## 2019-08-25 RX ADMIN — CARVEDILOL 12.5 MG: 6.25 TABLET, FILM COATED ORAL at 17:37

## 2019-08-25 RX ADMIN — CLONAZEPAM 0.5 MG: 0.5 TABLET ORAL at 08:24

## 2019-08-25 RX ADMIN — ALBUMIN HUMAN 25 G: 0.25 SOLUTION INTRAVENOUS at 17:32

## 2019-08-25 RX ADMIN — OYSTER SHELL CALCIUM WITH VITAMIN D 1 TABLET: 500; 200 TABLET, FILM COATED ORAL at 21:08

## 2019-08-25 RX ADMIN — GABAPENTIN 400 MG: 400 CAPSULE ORAL at 11:12

## 2019-08-25 RX ADMIN — QUETIAPINE 50 MG: 25 TABLET, FILM COATED ORAL at 21:08

## 2019-08-25 RX ADMIN — BUMETANIDE 0.5 MG: 0.25 INJECTION INTRAMUSCULAR; INTRAVENOUS at 11:13

## 2019-08-25 RX ADMIN — LACOSAMIDE 100 MG: 100 TABLET, FILM COATED ORAL at 21:08

## 2019-08-25 RX ADMIN — BUMETANIDE 0.5 MG: 0.25 INJECTION INTRAMUSCULAR; INTRAVENOUS at 17:33

## 2019-08-25 RX ADMIN — PREDNISONE 40 MG: 20 TABLET ORAL at 08:23

## 2019-08-25 RX ADMIN — FAMOTIDINE 20 MG: 20 TABLET, FILM COATED ORAL at 08:24

## 2019-08-25 RX ADMIN — BUPRENORPHINE HCL 8 MG: 8 TABLET SUBLINGUAL at 08:22

## 2019-08-25 RX ADMIN — LIDOCAINE 1 PATCH: 50 PATCH CUTANEOUS at 11:15

## 2019-08-25 RX ADMIN — FOLIC ACID 1 MG: 1 TABLET ORAL at 08:24

## 2019-08-25 RX ADMIN — MYCOPHENOLATE MOFETIL 500 MG: 250 CAPSULE ORAL at 08:24

## 2019-08-25 RX ADMIN — GABAPENTIN 400 MG: 400 CAPSULE ORAL at 21:08

## 2019-08-25 RX ADMIN — CALCIUM ACETATE 1334 MG: 667 CAPSULE ORAL at 08:21

## 2019-08-25 RX ADMIN — Medication 3 ML: at 08:25

## 2019-08-25 RX ADMIN — OYSTER SHELL CALCIUM WITH VITAMIN D 1 TABLET: 500; 200 TABLET, FILM COATED ORAL at 08:24

## 2019-08-25 RX ADMIN — BUPRENORPHINE HCL 8 MG: 8 TABLET SUBLINGUAL at 15:44

## 2019-08-25 RX ADMIN — LEVETIRACETAM 500 MG: 500 TABLET, FILM COATED ORAL at 21:08

## 2019-08-25 RX ADMIN — CLONAZEPAM 0.5 MG: 0.5 TABLET ORAL at 21:08

## 2019-08-25 RX ADMIN — GABAPENTIN 400 MG: 400 CAPSULE ORAL at 08:24

## 2019-08-25 RX ADMIN — CYANOCOBALAMIN TAB 250 MCG 500 MCG: 250 TAB at 08:22

## 2019-08-25 RX ADMIN — EPOETIN ALFA-EPBX 30000 UNITS: 10000 INJECTION, SOLUTION INTRAVENOUS; SUBCUTANEOUS at 17:33

## 2019-08-26 ENCOUNTER — APPOINTMENT (OUTPATIENT)
Dept: INTERVENTIONAL RADIOLOGY/VASCULAR | Facility: HOSPITAL | Age: 39
End: 2019-08-26

## 2019-08-26 ENCOUNTER — APPOINTMENT (OUTPATIENT)
Dept: GENERAL RADIOLOGY | Facility: HOSPITAL | Age: 39
End: 2019-08-26

## 2019-08-26 LAB
ALBUMIN SERPL-MCNC: 3.7 G/DL (ref 3.5–4.8)
ALBUMIN/GLOB SERPL: 1.4 G/DL (ref 1–1.7)
ALP SERPL-CCNC: 82 U/L (ref 32–91)
ALT SERPL W P-5'-P-CCNC: 12 U/L (ref 17–63)
ANION GAP SERPL CALCULATED.3IONS-SCNC: 18 MMOL/L (ref 5–15)
AST SERPL-CCNC: 10 U/L (ref 15–41)
BILIRUB SERPL-MCNC: 0.5 MG/DL (ref 0.3–1.2)
BUN BLD-MCNC: 103 MG/DL (ref 8–20)
BUN/CREAT SERPL: 49 (ref 6.2–20.3)
CA-I SERPL ISE-MCNC: 1.23 MMOL/L (ref 1.2–1.3)
CALCIUM SPEC-SCNC: 8.7 MG/DL (ref 8.9–10.3)
CHLORIDE SERPL-SCNC: 97 MMOL/L (ref 101–111)
CO2 SERPL-SCNC: 27 MMOL/L (ref 22–32)
CREAT BLD-MCNC: 2.1 MG/DL (ref 0.7–1.2)
DEPRECATED RDW RBC AUTO: 52.1 FL (ref 37–54)
ERYTHROCYTE [DISTWIDTH] IN BLOOD BY AUTOMATED COUNT: 16.9 % (ref 12.3–15.4)
GFR SERPL CREATININE-BSD FRML MDRD: 36 ML/MIN/1.73
GLOBULIN UR ELPH-MCNC: 2.7 GM/DL (ref 2.5–3.8)
GLUCOSE BLD-MCNC: 95 MG/DL (ref 65–99)
HAPTOGLOB SERPL-MCNC: 155 MG/DL (ref 36–195)
HBV SURFACE AG SERPL QL IA: NORMAL
HCT VFR BLD AUTO: 27.8 % (ref 37.5–51)
HGB BLD-MCNC: 8.8 G/DL (ref 13–17.7)
LAB AP CASE REPORT: NORMAL
LEVETIRACETAM SERPL-MCNC: 30.3 UG/ML (ref 10–40)
MAGNESIUM SERPL-MCNC: 2.5 MG/DL (ref 1.8–2.5)
MCH RBC QN AUTO: 27.2 PG (ref 26.6–33)
MCHC RBC AUTO-ENTMCNC: 31.7 G/DL (ref 31.5–35.7)
MCV RBC AUTO: 86 FL (ref 79–97)
PATH REPORT.FINAL DX SPEC: NORMAL
PATH REPORT.GROSS SPEC: NORMAL
PHOSPHATE SERPL-MCNC: 3.3 MG/DL (ref 2.4–4.7)
PLATELET # BLD AUTO: 39 10*3/MM3 (ref 140–450)
PMV BLD AUTO: 8.6 FL (ref 6–12)
POTASSIUM BLD-SCNC: 4 MMOL/L (ref 3.6–5.1)
PROT SERPL-MCNC: 6.4 G/DL (ref 6.1–7.9)
RBC # BLD AUTO: 3.23 10*6/MM3 (ref 4.14–5.8)
SODIUM BLD-SCNC: 138 MMOL/L (ref 136–144)
WBC NRBC COR # BLD: 5.5 10*3/MM3 (ref 3.4–10.8)

## 2019-08-26 PROCEDURE — 85027 COMPLETE CBC AUTOMATED: CPT | Performed by: INTERNAL MEDICINE

## 2019-08-26 PROCEDURE — 76937 US GUIDE VASCULAR ACCESS: CPT

## 2019-08-26 PROCEDURE — 99233 SBSQ HOSP IP/OBS HIGH 50: CPT | Performed by: INTERNAL MEDICINE

## 2019-08-26 PROCEDURE — 84100 ASSAY OF PHOSPHORUS: CPT | Performed by: INTERNAL MEDICINE

## 2019-08-26 PROCEDURE — 25010000002 HEPARIN (PORCINE) PER 1000 UNITS: Performed by: RADIOLOGY

## 2019-08-26 PROCEDURE — 80053 COMPREHEN METABOLIC PANEL: CPT | Performed by: INTERNAL MEDICINE

## 2019-08-26 PROCEDURE — 63710000001 PREDNISONE PER 1 MG: Performed by: INTERNAL MEDICINE

## 2019-08-26 PROCEDURE — C1752 CATH,HEMODIALYSIS,SHORT-TERM: HCPCS

## 2019-08-26 PROCEDURE — 83735 ASSAY OF MAGNESIUM: CPT | Performed by: INTERNAL MEDICINE

## 2019-08-26 PROCEDURE — 25010000002 ALBUMIN HUMAN 25% PER 50 ML: Performed by: INTERNAL MEDICINE

## 2019-08-26 PROCEDURE — 87340 HEPATITIS B SURFACE AG IA: CPT | Performed by: INTERNAL MEDICINE

## 2019-08-26 PROCEDURE — 72114 X-RAY EXAM L-S SPINE BENDING: CPT

## 2019-08-26 PROCEDURE — 82330 ASSAY OF CALCIUM: CPT | Performed by: INTERNAL MEDICINE

## 2019-08-26 PROCEDURE — P9047 ALBUMIN (HUMAN), 25%, 50ML: HCPCS | Performed by: INTERNAL MEDICINE

## 2019-08-26 PROCEDURE — 77001 FLUOROGUIDE FOR VEIN DEVICE: CPT

## 2019-08-26 RX ORDER — HEPARIN SODIUM 1000 [USP'U]/ML
INJECTION, SOLUTION INTRAVENOUS; SUBCUTANEOUS
Status: COMPLETED | OUTPATIENT
Start: 2019-08-26 | End: 2019-08-26

## 2019-08-26 RX ORDER — OXYCODONE HYDROCHLORIDE 5 MG/1
5 TABLET ORAL EVERY 6 HOURS PRN
Status: DISCONTINUED | OUTPATIENT
Start: 2019-08-26 | End: 2019-08-27

## 2019-08-26 RX ORDER — BUMETANIDE 1 MG/1
1 TABLET ORAL 2 TIMES DAILY
Status: DISCONTINUED | OUTPATIENT
Start: 2019-08-26 | End: 2019-08-30 | Stop reason: HOSPADM

## 2019-08-26 RX ORDER — ANTICOAGULANT CITRATE DEXTROSE SOLUTION FORMULA A 12.25; 11; 3.65 G/500ML; G/500ML; G/500ML
1000 SOLUTION INTRAVENOUS ONCE
Status: COMPLETED | OUTPATIENT
Start: 2019-08-26 | End: 2019-08-27

## 2019-08-26 RX ORDER — LIDOCAINE 50 MG/G
1 PATCH TOPICAL ONCE
Status: COMPLETED | OUTPATIENT
Start: 2019-08-26 | End: 2019-08-27

## 2019-08-26 RX ADMIN — GABAPENTIN 400 MG: 400 CAPSULE ORAL at 09:35

## 2019-08-26 RX ADMIN — BUPRENORPHINE HCL 8 MG: 8 TABLET SUBLINGUAL at 20:33

## 2019-08-26 RX ADMIN — CLONAZEPAM 0.5 MG: 0.5 TABLET ORAL at 09:34

## 2019-08-26 RX ADMIN — LEVETIRACETAM 500 MG: 500 TABLET, FILM COATED ORAL at 09:35

## 2019-08-26 RX ADMIN — BUMETANIDE 0.5 MG: 0.25 INJECTION INTRAMUSCULAR; INTRAVENOUS at 03:03

## 2019-08-26 RX ADMIN — CALCIUM ACETATE 1334 MG: 667 CAPSULE ORAL at 13:32

## 2019-08-26 RX ADMIN — BUPRENORPHINE HCL 8 MG: 8 TABLET SUBLINGUAL at 16:34

## 2019-08-26 RX ADMIN — HEPARIN SODIUM 3400 UNITS: 1000 INJECTION, SOLUTION INTRAVENOUS; SUBCUTANEOUS at 13:14

## 2019-08-26 RX ADMIN — OYSTER SHELL CALCIUM WITH VITAMIN D 1 TABLET: 500; 200 TABLET, FILM COATED ORAL at 09:35

## 2019-08-26 RX ADMIN — CLONAZEPAM 0.5 MG: 0.5 TABLET ORAL at 20:32

## 2019-08-26 RX ADMIN — LEVETIRACETAM 500 MG: 500 TABLET, FILM COATED ORAL at 20:32

## 2019-08-26 RX ADMIN — SERTRALINE HYDROCHLORIDE 50 MG: 50 TABLET ORAL at 09:35

## 2019-08-26 RX ADMIN — OXYCODONE HYDROCHLORIDE 5 MG: 5 TABLET ORAL at 16:34

## 2019-08-26 RX ADMIN — FOLIC ACID 1 MG: 1 TABLET ORAL at 09:35

## 2019-08-26 RX ADMIN — CALCIUM ACETATE 1334 MG: 667 CAPSULE ORAL at 09:34

## 2019-08-26 RX ADMIN — LACOSAMIDE 100 MG: 100 TABLET, FILM COATED ORAL at 20:33

## 2019-08-26 RX ADMIN — ALBUMIN HUMAN 25 G: 0.25 SOLUTION INTRAVENOUS at 01:24

## 2019-08-26 RX ADMIN — CALCIUM ACETATE 1334 MG: 667 CAPSULE ORAL at 17:34

## 2019-08-26 RX ADMIN — LIDOCAINE 1 PATCH: 50 PATCH CUTANEOUS at 09:39

## 2019-08-26 RX ADMIN — PREDNISONE 40 MG: 20 TABLET ORAL at 09:35

## 2019-08-26 RX ADMIN — BACLOFEN 20 MG: 10 TABLET ORAL at 09:33

## 2019-08-26 RX ADMIN — BUPRENORPHINE HCL 8 MG: 8 TABLET SUBLINGUAL at 09:34

## 2019-08-26 RX ADMIN — CARVEDILOL 12.5 MG: 6.25 TABLET, FILM COATED ORAL at 09:34

## 2019-08-26 RX ADMIN — BACLOFEN 20 MG: 10 TABLET ORAL at 16:37

## 2019-08-26 RX ADMIN — GABAPENTIN 400 MG: 400 CAPSULE ORAL at 17:34

## 2019-08-26 RX ADMIN — CYANOCOBALAMIN TAB 250 MCG 500 MCG: 250 TAB at 09:34

## 2019-08-26 RX ADMIN — CARVEDILOL 12.5 MG: 6.25 TABLET, FILM COATED ORAL at 17:34

## 2019-08-26 RX ADMIN — Medication 3 ML: at 20:32

## 2019-08-26 RX ADMIN — FAMOTIDINE 20 MG: 20 TABLET, FILM COATED ORAL at 09:35

## 2019-08-26 RX ADMIN — LIDOCAINE 1 PATCH: 50 PATCH CUTANEOUS at 13:49

## 2019-08-26 RX ADMIN — LACOSAMIDE 100 MG: 100 TABLET, FILM COATED ORAL at 09:34

## 2019-08-26 RX ADMIN — OYSTER SHELL CALCIUM WITH VITAMIN D 1 TABLET: 500; 200 TABLET, FILM COATED ORAL at 20:32

## 2019-08-26 RX ADMIN — GABAPENTIN 400 MG: 400 CAPSULE ORAL at 20:33

## 2019-08-26 RX ADMIN — GABAPENTIN 400 MG: 400 CAPSULE ORAL at 13:32

## 2019-08-26 RX ADMIN — Medication 3 ML: at 09:36

## 2019-08-26 RX ADMIN — BUMETANIDE 1 MG: 1 TABLET ORAL at 20:32

## 2019-08-26 RX ADMIN — BUMETANIDE 1 MG: 1 TABLET ORAL at 09:34

## 2019-08-27 ENCOUNTER — APPOINTMENT (OUTPATIENT)
Dept: CT IMAGING | Facility: HOSPITAL | Age: 39
End: 2019-08-27

## 2019-08-27 LAB
ALBUMIN SERPL ELPH-MCNC: 2.8 G/DL (ref 3.5–4.8)
ALBUMIN SERPL-MCNC: 3.6 G/DL (ref 3.5–4.8)
ALBUMIN/GLOB SERPL: 1.4 G/DL (ref 1–1.7)
ALP SERPL-CCNC: 62 U/L (ref 32–91)
ALPHA-1-GLOBULIN: 0.6 GM/DL (ref 0.1–0.4)
ALPHA-2-GLOBULIN: 1 GM/DL (ref 0.5–1)
ALT SERPL W P-5'-P-CCNC: 17 U/L (ref 17–63)
AMMONIA BLD-SCNC: 44 UMOL/L (ref 9–35)
ANION GAP SERPL CALCULATED.3IONS-SCNC: 16.8 MMOL/L (ref 5–15)
ARTERIAL PATENCY WRIST A: POSITIVE
ARTERIAL PATENCY WRIST A: POSITIVE
AST SERPL-CCNC: 17 U/L (ref 15–41)
ATMOSPHERIC PRESS: ABNORMAL MM[HG]
ATMOSPHERIC PRESS: ABNORMAL MM[HG]
BASE EXCESS BLDA CALC-SCNC: 7.8 MMOL/L (ref 0–3)
BASE EXCESS BLDA CALC-SCNC: 8.6 MMOL/L (ref 0–3)
BDY SITE: ABNORMAL
BDY SITE: ABNORMAL
BETA GLOBULIN: 0.8 GM/DL (ref 0.7–1.4)
BILIRUB SERPL-MCNC: 0.4 MG/DL (ref 0.3–1.2)
BNP SERPL-MCNC: 761 PG/ML
BUN BLD-MCNC: 85 MG/DL (ref 8–20)
BUN/CREAT SERPL: 44.7 (ref 6.2–20.3)
CA-I SERPL ISE-MCNC: 1.3 MMOL/L (ref 1.2–1.3)
CALCIUM SPEC-SCNC: 9.4 MG/DL (ref 8.9–10.3)
CARDIOLIPIN IGA SER IA-ACNC: <9 APL U/ML (ref 0–11)
CARDIOLIPIN IGG SER IA-ACNC: 11 GPL U/ML (ref 0–14)
CARDIOLIPIN IGM SER IA-ACNC: 16 MPL U/ML (ref 0–12)
CENTROMERE B AB SER-ACNC: <0.2 AI (ref 0–0.9)
CHLORIDE SERPL-SCNC: 96 MMOL/L (ref 101–111)
CHROMATIN AB SERPL-ACNC: >8 AI (ref 0–0.9)
CO2 BLDA-SCNC: 37 MMOL/L (ref 22–29)
CO2 BLDA-SCNC: 37.7 MMOL/L (ref 22–29)
CO2 SERPL-SCNC: 32 MMOL/L (ref 22–32)
CREAT BLD-MCNC: 1.9 MG/DL (ref 0.7–1.2)
DEPRECATED RDW RBC AUTO: 51.6 FL (ref 37–54)
DSDNA AB SER-ACNC: 45 IU/ML (ref 0–9)
ENA JO1 AB SER-ACNC: <0.2 AI (ref 0–0.9)
ENA RNP AB SER-ACNC: <0.2 AI (ref 0–0.9)
ENA SCL70 AB SER-ACNC: <0.2 AI (ref 0–0.9)
ENA SM AB SER-ACNC: 0.2 AI (ref 0–0.9)
ENA SS-A AB SER-ACNC: 0.2 AI (ref 0–0.9)
ENA SS-B AB SER-ACNC: <0.2 AI (ref 0–0.9)
ERYTHROCYTE [DISTWIDTH] IN BLOOD BY AUTOMATED COUNT: 17 % (ref 12.3–15.4)
GAMMA GLOBULIN: 1.2 GM/DL (ref 0.6–1.6)
GFR SERPL CREATININE-BSD FRML MDRD: 40 ML/MIN/1.73
GLOBULIN UR ELPH-MCNC: 2.6 GM/DL (ref 2.5–3.8)
GLUCOSE BLD-MCNC: 98 MG/DL (ref 65–99)
GLUCOSE BLDC GLUCOMTR-MCNC: 104 MG/DL (ref 70–105)
HCO3 BLDA-SCNC: 35 MMOL/L (ref 21–28)
HCO3 BLDA-SCNC: 35.7 MMOL/L (ref 21–28)
HCT VFR BLD AUTO: 28.5 % (ref 37.5–51)
HEMODILUTION: NO
HEMODILUTION: NO
HGB BLD-MCNC: 9.2 G/DL (ref 13–17.7)
HOROWITZ INDEX BLD+IHG-RTO: <21 %
HOROWITZ INDEX BLD+IHG-RTO: <21 %
Lab: ABNORMAL
MAGNESIUM SERPL-MCNC: 2.3 MG/DL (ref 1.8–2.5)
MCH RBC QN AUTO: 27.5 PG (ref 26.6–33)
MCHC RBC AUTO-ENTMCNC: 32.3 G/DL (ref 31.5–35.7)
MCV RBC AUTO: 85.1 FL (ref 79–97)
MODALITY: ABNORMAL
MODALITY: ABNORMAL
PCO2 BLDA: 65.4 MM HG (ref 35–48)
PCO2 BLDA: 65.5 MM HG (ref 35–48)
PH BLDA: 7.34 PH UNITS (ref 7.35–7.45)
PH BLDA: 7.34 PH UNITS (ref 7.35–7.45)
PHOSPHATE SERPL-MCNC: 3.2 MG/DL (ref 2.4–4.7)
PLATELET # BLD AUTO: 50 10*3/MM3 (ref 140–450)
PMV BLD AUTO: 9 FL (ref 6–12)
PO2 BLDA: 117.9 MM HG (ref 83–108)
PO2 BLDA: 57.4 MM HG (ref 83–108)
POTASSIUM BLD-SCNC: 3.8 MMOL/L (ref 3.6–5.1)
PROT PATTERN SERPL ELPH-IMP: ABNORMAL
PROT SERPL-MCNC: 6.2 G/DL (ref 6.1–7.9)
PROT SERPL-MCNC: 6.3 G/DL (ref 6.1–7.9)
RBC # BLD AUTO: 3.35 10*6/MM3 (ref 4.14–5.8)
SAO2 % BLDCOA: 86.6 % (ref 94–98)
SAO2 % BLDCOA: 98.1 % (ref 94–98)
SODIUM BLD-SCNC: 141 MMOL/L (ref 136–144)
WBC NRBC COR # BLD: 9.6 10*3/MM3 (ref 3.4–10.8)

## 2019-08-27 PROCEDURE — 82962 GLUCOSE BLOOD TEST: CPT

## 2019-08-27 PROCEDURE — 99233 SBSQ HOSP IP/OBS HIGH 50: CPT | Performed by: INTERNAL MEDICINE

## 2019-08-27 PROCEDURE — 82803 BLOOD GASES ANY COMBINATION: CPT

## 2019-08-27 PROCEDURE — 70450 CT HEAD/BRAIN W/O DYE: CPT

## 2019-08-27 PROCEDURE — P9017 PLASMA 1 DONOR FRZ W/IN 8 HR: HCPCS

## 2019-08-27 PROCEDURE — 36430 TRANSFUSION BLD/BLD COMPNT: CPT

## 2019-08-27 PROCEDURE — 83735 ASSAY OF MAGNESIUM: CPT | Performed by: INTERNAL MEDICINE

## 2019-08-27 PROCEDURE — 25010000002 CALCIUM GLUCONATE PER 10 ML: Performed by: INTERNAL MEDICINE

## 2019-08-27 PROCEDURE — 82140 ASSAY OF AMMONIA: CPT | Performed by: INTERNAL MEDICINE

## 2019-08-27 PROCEDURE — 86927 PLASMA FRESH FROZEN: CPT

## 2019-08-27 PROCEDURE — 25010000002 HALOPERIDOL LACTATE PER 5 MG

## 2019-08-27 PROCEDURE — 83880 ASSAY OF NATRIURETIC PEPTIDE: CPT | Performed by: INTERNAL MEDICINE

## 2019-08-27 PROCEDURE — 80053 COMPREHEN METABOLIC PANEL: CPT | Performed by: INTERNAL MEDICINE

## 2019-08-27 PROCEDURE — 25010000002 NALOXONE PER 1 MG

## 2019-08-27 PROCEDURE — 85027 COMPLETE CBC AUTOMATED: CPT | Performed by: INTERNAL MEDICINE

## 2019-08-27 PROCEDURE — 36600 WITHDRAWAL OF ARTERIAL BLOOD: CPT

## 2019-08-27 PROCEDURE — 82330 ASSAY OF CALCIUM: CPT | Performed by: INTERNAL MEDICINE

## 2019-08-27 PROCEDURE — 94799 UNLISTED PULMONARY SVC/PX: CPT

## 2019-08-27 PROCEDURE — 84100 ASSAY OF PHOSPHORUS: CPT | Performed by: INTERNAL MEDICINE

## 2019-08-27 PROCEDURE — 63710000001 PREDNISONE PER 1 MG: Performed by: INTERNAL MEDICINE

## 2019-08-27 RX ORDER — DEXTROSE MONOHYDRATE 25 G/50ML
INJECTION, SOLUTION INTRAVENOUS
Status: COMPLETED
Start: 2019-08-27 | End: 2019-08-27

## 2019-08-27 RX ORDER — NALOXONE HYDROCHLORIDE 0.4 MG/ML
INJECTION, SOLUTION INTRAMUSCULAR; INTRAVENOUS; SUBCUTANEOUS
Status: COMPLETED
Start: 2019-08-27 | End: 2019-08-27

## 2019-08-27 RX ORDER — OXYCODONE HYDROCHLORIDE 5 MG/1
2.5 TABLET ORAL EVERY 6 HOURS PRN
Status: DISCONTINUED | OUTPATIENT
Start: 2019-08-27 | End: 2019-08-28

## 2019-08-27 RX ORDER — HALOPERIDOL 5 MG/ML
INJECTION INTRAMUSCULAR
Status: COMPLETED
Start: 2019-08-27 | End: 2019-08-27

## 2019-08-27 RX ORDER — HALOPERIDOL 5 MG/ML
5 INJECTION INTRAMUSCULAR ONCE
Status: COMPLETED | OUTPATIENT
Start: 2019-08-27 | End: 2019-08-27

## 2019-08-27 RX ORDER — LACTULOSE 10 G/15ML
20 SOLUTION ORAL DAILY
Status: DISCONTINUED | OUTPATIENT
Start: 2019-08-27 | End: 2019-08-30 | Stop reason: HOSPADM

## 2019-08-27 RX ORDER — NALOXONE HYDROCHLORIDE 1 MG/ML
0.8 INJECTION INTRAMUSCULAR; INTRAVENOUS; SUBCUTANEOUS ONCE
Status: DISCONTINUED | OUTPATIENT
Start: 2019-08-27 | End: 2019-08-30 | Stop reason: HOSPADM

## 2019-08-27 RX ORDER — SODIUM CHLORIDE 0.9 % (FLUSH) 0.9 %
1-10 SYRINGE (ML) INJECTION ONCE AS NEEDED
Status: DISCONTINUED | OUTPATIENT
Start: 2019-08-27 | End: 2019-08-30 | Stop reason: HOSPADM

## 2019-08-27 RX ORDER — ANTICOAGULANT CITRATE DEXTROSE SOLUTION FORMULA A 12.25; 11; 3.65 G/500ML; G/500ML; G/500ML
1000 SOLUTION INTRAVENOUS EVERY 24 HOURS
Status: DISCONTINUED | OUTPATIENT
Start: 2019-08-27 | End: 2019-08-30 | Stop reason: HOSPADM

## 2019-08-27 RX ADMIN — Medication 3 ML: at 10:10

## 2019-08-27 RX ADMIN — DEXTROSE 50 % IN WATER (D50W) INTRAVENOUS SYRINGE: at 13:15

## 2019-08-27 RX ADMIN — FOLIC ACID 1 MG: 1 TABLET ORAL at 10:08

## 2019-08-27 RX ADMIN — LEVETIRACETAM 500 MG: 500 TABLET, FILM COATED ORAL at 10:11

## 2019-08-27 RX ADMIN — NALOXONE HYDROCHLORIDE: 0.4 INJECTION, SOLUTION INTRAMUSCULAR; INTRAVENOUS; SUBCUTANEOUS at 13:15

## 2019-08-27 RX ADMIN — BUPRENORPHINE HCL 8 MG: 8 TABLET SUBLINGUAL at 17:40

## 2019-08-27 RX ADMIN — CYANOCOBALAMIN TAB 250 MCG 500 MCG: 250 TAB at 10:08

## 2019-08-27 RX ADMIN — CARVEDILOL 12.5 MG: 6.25 TABLET, FILM COATED ORAL at 10:09

## 2019-08-27 RX ADMIN — CLONAZEPAM 0.5 MG: 0.5 TABLET ORAL at 10:09

## 2019-08-27 RX ADMIN — ANTICOAGULANT CITRATE DEXTROSE SOLUTION FORMULA A 1000 ML: 12.25; 11; 3.65 SOLUTION INTRAVENOUS at 20:55

## 2019-08-27 RX ADMIN — HALOPERIDOL LACTATE 5 MG: 5 INJECTION INTRAMUSCULAR at 14:30

## 2019-08-27 RX ADMIN — BUMETANIDE 1 MG: 1 TABLET ORAL at 10:08

## 2019-08-27 RX ADMIN — LIDOCAINE 1 PATCH: 50 PATCH CUTANEOUS at 10:10

## 2019-08-27 RX ADMIN — CALCIUM GLUCONATE: 98 INJECTION, SOLUTION INTRAVENOUS at 01:45

## 2019-08-27 RX ADMIN — OXYCODONE HYDROCHLORIDE 5 MG: 5 TABLET ORAL at 10:08

## 2019-08-27 RX ADMIN — BACLOFEN 20 MG: 10 TABLET ORAL at 10:08

## 2019-08-27 RX ADMIN — OYSTER SHELL CALCIUM WITH VITAMIN D 1 TABLET: 500; 200 TABLET, FILM COATED ORAL at 10:09

## 2019-08-27 RX ADMIN — SERTRALINE HYDROCHLORIDE 50 MG: 50 TABLET ORAL at 10:09

## 2019-08-27 RX ADMIN — PREDNISONE 40 MG: 20 TABLET ORAL at 10:08

## 2019-08-27 RX ADMIN — GABAPENTIN 400 MG: 400 CAPSULE ORAL at 17:40

## 2019-08-27 RX ADMIN — CARVEDILOL 12.5 MG: 6.25 TABLET, FILM COATED ORAL at 17:40

## 2019-08-27 RX ADMIN — HALOPERIDOL 5 MG: 5 INJECTION INTRAMUSCULAR at 14:30

## 2019-08-27 RX ADMIN — LACOSAMIDE 100 MG: 100 TABLET, FILM COATED ORAL at 10:09

## 2019-08-27 RX ADMIN — ANTICOAGULANT CITRATE DEXTROSE SOLUTION FORMULA A 1000 ML: 12.25; 11; 3.65 SOLUTION INTRAVENOUS at 01:45

## 2019-08-27 RX ADMIN — CALCIUM GLUCONATE: 98 INJECTION, SOLUTION INTRAVENOUS at 20:54

## 2019-08-27 RX ADMIN — FAMOTIDINE 20 MG: 20 TABLET, FILM COATED ORAL at 10:09

## 2019-08-27 RX ADMIN — GABAPENTIN 400 MG: 400 CAPSULE ORAL at 10:09

## 2019-08-28 LAB
ABO + RH BLD: NORMAL
ALBUMIN SERPL-MCNC: 3 G/DL (ref 3.5–4.8)
ALBUMIN/GLOB SERPL: 1.3 G/DL (ref 1–1.7)
ALP SERPL-CCNC: 52 U/L (ref 32–91)
ALT SERPL W P-5'-P-CCNC: 20 U/L (ref 17–63)
AMORPH URATE CRY URNS QL MICRO: ABNORMAL /HPF
ANION GAP SERPL CALCULATED.3IONS-SCNC: 15 MMOL/L (ref 5–15)
AST SERPL-CCNC: 20 U/L (ref 15–41)
B2 GLYCOPROT1 IGA SER-ACNC: <9 GPI IGA UNITS (ref 0–25)
B2 GLYCOPROT1 IGG SER-ACNC: 13 GPI IGG UNITS (ref 0–20)
B2 GLYCOPROT1 IGM SER-ACNC: 12 GPI IGM UNITS (ref 0–32)
BACTERIA UR QL AUTO: ABNORMAL /HPF
BASOPHILS # BLD AUTO: 0 10*3/MM3 (ref 0–0.2)
BASOPHILS NFR BLD AUTO: 0 % (ref 0–1.5)
BH BB BLOOD EXPIRATION DATE: NORMAL
BH BB BLOOD TYPE BARCODE: 600
BH BB BLOOD TYPE BARCODE: 6200
BH BB DISPENSE STATUS: NORMAL
BH BB PRODUCT CODE: NORMAL
BH BB UNIT NUMBER: NORMAL
BILIRUB SERPL-MCNC: 0.2 MG/DL
BILIRUB SERPL-MCNC: 0.5 MG/DL (ref 0.3–1.2)
BILIRUB UR QL STRIP: NEGATIVE
BUN BLD-MCNC: 76 MG/DL (ref 8–20)
BUN/CREAT SERPL: 44.7 (ref 6.2–20.3)
CA-I SERPL ISE-MCNC: 1.3 MMOL/L (ref 1.2–1.3)
CALCIUM SPEC-SCNC: 9.4 MG/DL (ref 8.9–10.3)
CHLORIDE SERPL-SCNC: 98 MMOL/L (ref 101–111)
CLARITY UR: ABNORMAL
CO2 SERPL-SCNC: 37 MMOL/L (ref 22–32)
COLOR UR: YELLOW
CREAT BLD-MCNC: 1.7 MG/DL (ref 0.7–1.2)
CROSSMATCH INTERPRETATION: NORMAL
CROSSMATCH INTERPRETATION: NORMAL
DEPRECATED RDW RBC AUTO: 52.9 FL (ref 37–54)
EOSINOPHIL # BLD AUTO: 0 10*3/MM3 (ref 0–0.4)
EOSINOPHIL NFR BLD AUTO: 0.1 % (ref 0.3–6.2)
ERYTHROCYTE [DISTWIDTH] IN BLOOD BY AUTOMATED COUNT: 17.3 % (ref 12.3–15.4)
GFR SERPL CREATININE-BSD FRML MDRD: 46 ML/MIN/1.73
GLOBULIN UR ELPH-MCNC: 2.3 GM/DL (ref 2.5–3.8)
GLUCOSE BLD-MCNC: 96 MG/DL (ref 65–99)
GLUCOSE UR STRIP-MCNC: NEGATIVE MG/DL
HCT VFR BLD AUTO: 28.8 % (ref 37.5–51)
HGB BLD-MCNC: 9.1 G/DL (ref 13–17.7)
HGB UR QL STRIP.AUTO: ABNORMAL
HYALINE CASTS UR QL AUTO: ABNORMAL /LPF
KETONES UR QL STRIP: NEGATIVE
LEUKOCYTE ESTERASE UR QL STRIP.AUTO: NEGATIVE
LYMPHOCYTES # BLD AUTO: 1.1 10*3/MM3 (ref 0.7–3.1)
LYMPHOCYTES NFR BLD AUTO: 14.9 % (ref 19.6–45.3)
MAGNESIUM SERPL-MCNC: 2.2 MG/DL (ref 1.8–2.5)
MCH RBC QN AUTO: 27.1 PG (ref 26.6–33)
MCHC RBC AUTO-ENTMCNC: 31.6 G/DL (ref 31.5–35.7)
MCV RBC AUTO: 85.9 FL (ref 79–97)
MONOCYTES # BLD AUTO: 0.6 10*3/MM3 (ref 0.1–0.9)
MONOCYTES NFR BLD AUTO: 8.4 % (ref 5–12)
NEUTROPHILS # BLD AUTO: 5.4 10*3/MM3 (ref 1.7–7)
NEUTROPHILS NFR BLD AUTO: 76.6 % (ref 42.7–76)
NITRITE UR QL STRIP: NEGATIVE
NRBC BLD AUTO-RTO: 0.1 /100 WBC (ref 0–0.2)
PH UR STRIP.AUTO: <=5 [PH] (ref 5–8)
PHOSPHATE SERPL-MCNC: 5.2 MG/DL (ref 2.4–4.7)
PLATELET # BLD AUTO: 67 10*3/MM3 (ref 140–450)
PMV BLD AUTO: 7.5 FL (ref 6–12)
POTASSIUM BLD-SCNC: 4 MMOL/L (ref 3.6–5.1)
PROT SERPL-MCNC: 5.3 G/DL (ref 6.1–7.9)
PROT UR QL STRIP: ABNORMAL
RBC # BLD AUTO: 3.36 10*6/MM3 (ref 4.14–5.8)
RBC # UR: ABNORMAL /HPF
REF LAB TEST METHOD: ABNORMAL
SODIUM BLD-SCNC: 146 MMOL/L (ref 136–144)
SP GR UR STRIP: 1.02 (ref 1–1.03)
SQUAMOUS #/AREA URNS HPF: ABNORMAL /HPF
UNIT  ABO: NORMAL
UNIT  RH: NORMAL
UROBILINOGEN UR QL STRIP: ABNORMAL
WBC NRBC COR # BLD: 7 10*3/MM3 (ref 3.4–10.8)
WBC UR QL AUTO: ABNORMAL /HPF

## 2019-08-28 PROCEDURE — 99221 1ST HOSP IP/OBS SF/LOW 40: CPT | Performed by: PSYCHIATRY & NEUROLOGY

## 2019-08-28 PROCEDURE — 85025 COMPLETE CBC W/AUTO DIFF WBC: CPT | Performed by: INTERNAL MEDICINE

## 2019-08-28 PROCEDURE — 82330 ASSAY OF CALCIUM: CPT | Performed by: INTERNAL MEDICINE

## 2019-08-28 PROCEDURE — 99233 SBSQ HOSP IP/OBS HIGH 50: CPT | Performed by: INTERNAL MEDICINE

## 2019-08-28 PROCEDURE — P9017 PLASMA 1 DONOR FRZ W/IN 8 HR: HCPCS

## 2019-08-28 PROCEDURE — 36514 APHERESIS PLASMA: CPT

## 2019-08-28 PROCEDURE — 83735 ASSAY OF MAGNESIUM: CPT | Performed by: INTERNAL MEDICINE

## 2019-08-28 PROCEDURE — 80053 COMPREHEN METABOLIC PANEL: CPT | Performed by: INTERNAL MEDICINE

## 2019-08-28 PROCEDURE — 86927 PLASMA FRESH FROZEN: CPT

## 2019-08-28 PROCEDURE — 87086 URINE CULTURE/COLONY COUNT: CPT | Performed by: INTERNAL MEDICINE

## 2019-08-28 PROCEDURE — 84100 ASSAY OF PHOSPHORUS: CPT | Performed by: INTERNAL MEDICINE

## 2019-08-28 PROCEDURE — 25010000002 CALCIUM GLUCONATE PER 10 ML: Performed by: INTERNAL MEDICINE

## 2019-08-28 PROCEDURE — 81001 URINALYSIS AUTO W/SCOPE: CPT | Performed by: INTERNAL MEDICINE

## 2019-08-28 PROCEDURE — 63710000001 PREDNISONE PER 1 MG: Performed by: INTERNAL MEDICINE

## 2019-08-28 RX ORDER — BACLOFEN 10 MG/1
5 TABLET ORAL 3 TIMES DAILY PRN
Status: DISCONTINUED | OUTPATIENT
Start: 2019-08-28 | End: 2019-08-30 | Stop reason: HOSPADM

## 2019-08-28 RX ORDER — ROPINIROLE 0.25 MG/1
0.25 TABLET, FILM COATED ORAL DAILY PRN
Status: DISCONTINUED | OUTPATIENT
Start: 2019-08-28 | End: 2019-08-30 | Stop reason: HOSPADM

## 2019-08-28 RX ORDER — ANTICOAGULANT CITRATE DEXTROSE SOLUTION FORMULA A 12.25; 11; 3.65 G/500ML; G/500ML; G/500ML
1000 SOLUTION INTRAVENOUS DAILY
Status: DISCONTINUED | OUTPATIENT
Start: 2019-08-28 | End: 2019-08-30 | Stop reason: HOSPADM

## 2019-08-28 RX ORDER — HALOPERIDOL 2 MG/ML
3 SOLUTION ORAL EVERY 4 HOURS PRN
Status: DISCONTINUED | OUTPATIENT
Start: 2019-08-28 | End: 2019-08-30 | Stop reason: HOSPADM

## 2019-08-28 RX ORDER — BUPRENORPHINE 2 MG/1
2 TABLET SUBLINGUAL DAILY
Status: DISCONTINUED | OUTPATIENT
Start: 2019-08-29 | End: 2019-08-29

## 2019-08-28 RX ADMIN — LACOSAMIDE 100 MG: 100 TABLET, FILM COATED ORAL at 00:55

## 2019-08-28 RX ADMIN — CLONAZEPAM 0.5 MG: 0.5 TABLET ORAL at 00:54

## 2019-08-28 RX ADMIN — LEVETIRACETAM 500 MG: 500 TABLET, FILM COATED ORAL at 00:55

## 2019-08-28 RX ADMIN — Medication 3 ML: at 08:29

## 2019-08-28 RX ADMIN — OYSTER SHELL CALCIUM WITH VITAMIN D 1 TABLET: 500; 200 TABLET, FILM COATED ORAL at 00:54

## 2019-08-28 RX ADMIN — LACOSAMIDE 100 MG: 100 TABLET, FILM COATED ORAL at 21:54

## 2019-08-28 RX ADMIN — FOLIC ACID 1 MG: 1 TABLET ORAL at 08:28

## 2019-08-28 RX ADMIN — QUETIAPINE 50 MG: 25 TABLET, FILM COATED ORAL at 00:56

## 2019-08-28 RX ADMIN — GABAPENTIN 400 MG: 400 CAPSULE ORAL at 00:55

## 2019-08-28 RX ADMIN — BUMETANIDE 1 MG: 1 TABLET ORAL at 00:52

## 2019-08-28 RX ADMIN — LACOSAMIDE 100 MG: 100 TABLET, FILM COATED ORAL at 08:28

## 2019-08-28 RX ADMIN — GABAPENTIN 400 MG: 400 CAPSULE ORAL at 21:54

## 2019-08-28 RX ADMIN — LEVETIRACETAM 500 MG: 500 TABLET, FILM COATED ORAL at 08:28

## 2019-08-28 RX ADMIN — SERTRALINE HYDROCHLORIDE 50 MG: 50 TABLET ORAL at 08:29

## 2019-08-28 RX ADMIN — OYSTER SHELL CALCIUM WITH VITAMIN D 1 TABLET: 500; 200 TABLET, FILM COATED ORAL at 21:53

## 2019-08-28 RX ADMIN — BUPRENORPHINE HCL 8 MG: 8 TABLET SUBLINGUAL at 08:28

## 2019-08-28 RX ADMIN — CALCIUM GLUCONATE: 98 INJECTION, SOLUTION INTRAVENOUS at 14:45

## 2019-08-28 RX ADMIN — CYANOCOBALAMIN TAB 250 MCG 500 MCG: 250 TAB at 08:28

## 2019-08-28 RX ADMIN — CLONAZEPAM 0.5 MG: 0.5 TABLET ORAL at 21:54

## 2019-08-28 RX ADMIN — CARVEDILOL 12.5 MG: 6.25 TABLET, FILM COATED ORAL at 08:28

## 2019-08-28 RX ADMIN — OYSTER SHELL CALCIUM WITH VITAMIN D 1 TABLET: 500; 200 TABLET, FILM COATED ORAL at 08:28

## 2019-08-28 RX ADMIN — BUPRENORPHINE HCL 8 MG: 8 TABLET SUBLINGUAL at 00:53

## 2019-08-28 RX ADMIN — FAMOTIDINE 20 MG: 20 TABLET, FILM COATED ORAL at 08:28

## 2019-08-28 RX ADMIN — ANTICOAGULANT CITRATE DEXTROSE SOLUTION FORMULA A 1000 ML: 12.25; 11; 3.65 SOLUTION INTRAVENOUS at 14:45

## 2019-08-28 RX ADMIN — PREDNISONE 40 MG: 20 TABLET ORAL at 08:28

## 2019-08-28 RX ADMIN — OXYCODONE HYDROCHLORIDE 2.5 MG: 5 TABLET ORAL at 00:07

## 2019-08-28 RX ADMIN — LEVETIRACETAM 500 MG: 500 TABLET, FILM COATED ORAL at 21:54

## 2019-08-28 RX ADMIN — Medication 3 ML: at 00:56

## 2019-08-28 RX ADMIN — CLONAZEPAM 0.5 MG: 0.5 TABLET ORAL at 08:28

## 2019-08-28 RX ADMIN — BUMETANIDE 1 MG: 1 TABLET ORAL at 21:51

## 2019-08-28 RX ADMIN — Medication 3 ML: at 21:55

## 2019-08-29 ENCOUNTER — APPOINTMENT (OUTPATIENT)
Dept: NEUROLOGY | Facility: HOSPITAL | Age: 39
End: 2019-08-29

## 2019-08-29 LAB
ABO + RH BLD: NORMAL
ALBUMIN SERPL-MCNC: 3.3 G/DL (ref 3.5–4.8)
ALBUMIN/GLOB SERPL: 1.5 G/DL (ref 1–1.7)
ALP SERPL-CCNC: 52 U/L (ref 32–91)
ALT SERPL W P-5'-P-CCNC: 18 U/L (ref 17–63)
ANION GAP SERPL CALCULATED.3IONS-SCNC: 15.8 MMOL/L (ref 5–15)
APTT HEX PL PPP: 81 SEC (ref 0–11)
AST SERPL-CCNC: 16 U/L (ref 15–41)
BACTERIA SPEC AEROBE CULT: NO GROWTH
BH BB BLOOD EXPIRATION DATE: NORMAL
BH BB BLOOD TYPE BARCODE: 600
BH BB BLOOD TYPE BARCODE: 6200
BH BB BLOOD TYPE BARCODE: NORMAL
BH BB DISPENSE STATUS: NORMAL
BH BB PRODUCT CODE: NORMAL
BH BB UNIT NUMBER: NORMAL
BILIRUB SERPL-MCNC: 0.5 MG/DL (ref 0.3–1.2)
BUN BLD-MCNC: 66 MG/DL (ref 8–20)
BUN/CREAT SERPL: 38.8 (ref 6.2–20.3)
CA-I SERPL ISE-MCNC: 1.23 MMOL/L (ref 1.2–1.3)
CALCIUM SPEC-SCNC: 8.9 MG/DL (ref 8.9–10.3)
CHLORIDE SERPL-SCNC: 95 MMOL/L (ref 101–111)
CO2 SERPL-SCNC: 39 MMOL/L (ref 22–32)
CREAT BLD-MCNC: 1.7 MG/DL (ref 0.7–1.2)
DEPRECATED RDW RBC AUTO: 54.7 FL (ref 37–54)
DRVVT IMM 1:2 NP PPP: 91.1 SEC (ref 0–47)
ERYTHROCYTE [DISTWIDTH] IN BLOOD BY AUTOMATED COUNT: 17.9 % (ref 12.3–15.4)
GFR SERPL CREATININE-BSD FRML MDRD: 46 ML/MIN/1.73
GLOBULIN UR ELPH-MCNC: 2.2 GM/DL (ref 2.5–3.8)
GLUCOSE BLD-MCNC: 133 MG/DL (ref 65–99)
HCT VFR BLD AUTO: 28.3 % (ref 37.5–51)
HGB BLD-MCNC: 8.9 G/DL (ref 13–17.7)
LA NT DPL PPP: 155.2 SEC (ref 0–55)
LA NT DPL/LA NT HPL PPP-RTO: 2.15 RATIO (ref 0–1.4)
LA NT PLATELET PPP: 108 SEC (ref 0–51.9)
LDH SERPL-CCNC: 145 U/L (ref 98–192)
LUPUS ANTICOAGULANT REFLEX: ABNORMAL
MAGNESIUM SERPL-MCNC: 2.1 MG/DL (ref 1.8–2.5)
MCH RBC QN AUTO: 27.3 PG (ref 26.6–33)
MCHC RBC AUTO-ENTMCNC: 31.4 G/DL (ref 31.5–35.7)
MCV RBC AUTO: 87.2 FL (ref 79–97)
PHOSPHATE SERPL-MCNC: 5.2 MG/DL (ref 2.4–4.7)
PLATELET # BLD AUTO: 79 10*3/MM3 (ref 140–450)
PMV BLD AUTO: 7.3 FL (ref 6–12)
POTASSIUM BLD-SCNC: 3.8 MMOL/L (ref 3.6–5.1)
PROT SERPL-MCNC: 5.5 G/DL (ref 6.1–7.9)
PS IGA SER-ACNC: 3 APS IGA (ref 0–20)
PS IGG SER-ACNC: 15 GPS IGG (ref 0–11)
PS IGM SER-ACNC: 29 MPS IGM (ref 0–25)
PTT LA MIX: 113.1 SEC (ref 0–48.9)
RBC # BLD AUTO: 3.24 10*6/MM3 (ref 4.14–5.8)
RETICS # AUTO: 0.17 10*6/MM3 (ref 0.02–0.13)
RETICS/RBC NFR AUTO: 5.33 % (ref 0.7–1.9)
SCREEN DRVVT: 130.6 SEC (ref 0–47)
SCREEN DRVVT: 3 RATIO (ref 0.8–1.2)
SODIUM BLD-SCNC: 146 MMOL/L (ref 136–144)
THROMBIN TIME: 19.4 SEC (ref 0–23)
UNIT  ABO: NORMAL
UNIT  RH: NORMAL
WBC NRBC COR # BLD: 8.4 10*3/MM3 (ref 3.4–10.8)

## 2019-08-29 PROCEDURE — 99233 SBSQ HOSP IP/OBS HIGH 50: CPT | Performed by: INTERNAL MEDICINE

## 2019-08-29 PROCEDURE — 83735 ASSAY OF MAGNESIUM: CPT | Performed by: INTERNAL MEDICINE

## 2019-08-29 PROCEDURE — 99232 SBSQ HOSP IP/OBS MODERATE 35: CPT | Performed by: INTERNAL MEDICINE

## 2019-08-29 PROCEDURE — 86927 PLASMA FRESH FROZEN: CPT

## 2019-08-29 PROCEDURE — 84100 ASSAY OF PHOSPHORUS: CPT | Performed by: INTERNAL MEDICINE

## 2019-08-29 PROCEDURE — 82330 ASSAY OF CALCIUM: CPT | Performed by: INTERNAL MEDICINE

## 2019-08-29 PROCEDURE — 63710000001 PREDNISONE PER 1 MG: Performed by: INTERNAL MEDICINE

## 2019-08-29 PROCEDURE — 80053 COMPREHEN METABOLIC PANEL: CPT | Performed by: INTERNAL MEDICINE

## 2019-08-29 PROCEDURE — 95816 EEG AWAKE AND DROWSY: CPT

## 2019-08-29 PROCEDURE — 83615 LACTATE (LD) (LDH) ENZYME: CPT | Performed by: NURSE PRACTITIONER

## 2019-08-29 PROCEDURE — P9017 PLASMA 1 DONOR FRZ W/IN 8 HR: HCPCS

## 2019-08-29 PROCEDURE — 63710000001 MYCOPHENOLATE MOFETIL PER 250 MG: Performed by: INTERNAL MEDICINE

## 2019-08-29 PROCEDURE — 85045 AUTOMATED RETICULOCYTE COUNT: CPT | Performed by: NURSE PRACTITIONER

## 2019-08-29 PROCEDURE — 25010000002 CALCIUM GLUCONATE PER 10 ML: Performed by: INTERNAL MEDICINE

## 2019-08-29 PROCEDURE — 36430 TRANSFUSION BLD/BLD COMPNT: CPT

## 2019-08-29 PROCEDURE — 95816 EEG AWAKE AND DROWSY: CPT | Performed by: PSYCHIATRY & NEUROLOGY

## 2019-08-29 PROCEDURE — 25010000002 FONDAPARINUX PER 0.5 MG: Performed by: NURSE PRACTITIONER

## 2019-08-29 PROCEDURE — 99232 SBSQ HOSP IP/OBS MODERATE 35: CPT | Performed by: PSYCHIATRY & NEUROLOGY

## 2019-08-29 PROCEDURE — 85027 COMPLETE CBC AUTOMATED: CPT | Performed by: INTERNAL MEDICINE

## 2019-08-29 RX ORDER — FONDAPARINUX SODIUM 7.5 MG/.6ML
7.5 INJECTION SUBCUTANEOUS
Status: DISCONTINUED | OUTPATIENT
Start: 2019-08-29 | End: 2019-08-30 | Stop reason: HOSPADM

## 2019-08-29 RX ORDER — MYCOPHENOLATE MOFETIL 250 MG/1
500 CAPSULE ORAL EVERY 12 HOURS SCHEDULED
Status: DISCONTINUED | OUTPATIENT
Start: 2019-08-29 | End: 2019-08-30 | Stop reason: HOSPADM

## 2019-08-29 RX ORDER — BUPRENORPHINE 2 MG/1
2 TABLET SUBLINGUAL DAILY
Status: CANCELLED | OUTPATIENT
Start: 2019-08-30

## 2019-08-29 RX ORDER — BUPRENORPHINE 2 MG/1
4 TABLET SUBLINGUAL 3 TIMES DAILY
Status: DISCONTINUED | OUTPATIENT
Start: 2019-08-29 | End: 2019-08-30

## 2019-08-29 RX ORDER — LABETALOL HYDROCHLORIDE 5 MG/ML
10 INJECTION, SOLUTION INTRAVENOUS ONCE
Status: COMPLETED | OUTPATIENT
Start: 2019-08-30 | End: 2019-08-29

## 2019-08-29 RX ORDER — ACETAMINOPHEN 325 MG/1
650 TABLET ORAL ONCE
Status: COMPLETED | OUTPATIENT
Start: 2019-08-29 | End: 2019-08-29

## 2019-08-29 RX ADMIN — BUPRENORPHINE HYDROCHLORIDE 4 MG: 2 TABLET SUBLINGUAL at 15:57

## 2019-08-29 RX ADMIN — ACETAMINOPHEN 650 MG: 325 TABLET ORAL at 23:11

## 2019-08-29 RX ADMIN — Medication 3 ML: at 08:37

## 2019-08-29 RX ADMIN — FONDAPARINUX SODIUM 7.5 MG: 7.5 INJECTION, SOLUTION SUBCUTANEOUS at 21:20

## 2019-08-29 RX ADMIN — CARVEDILOL 12.5 MG: 6.25 TABLET, FILM COATED ORAL at 08:36

## 2019-08-29 RX ADMIN — LACOSAMIDE 100 MG: 100 TABLET, FILM COATED ORAL at 20:52

## 2019-08-29 RX ADMIN — LACTULOSE 20 G: 10 SOLUTION ORAL at 09:46

## 2019-08-29 RX ADMIN — CLONAZEPAM 0.5 MG: 0.5 TABLET ORAL at 08:36

## 2019-08-29 RX ADMIN — LACOSAMIDE 100 MG: 100 TABLET, FILM COATED ORAL at 08:36

## 2019-08-29 RX ADMIN — LEVETIRACETAM 500 MG: 500 TABLET, FILM COATED ORAL at 08:36

## 2019-08-29 RX ADMIN — BACLOFEN 5 MG: 10 TABLET ORAL at 21:39

## 2019-08-29 RX ADMIN — LEVETIRACETAM 500 MG: 500 TABLET, FILM COATED ORAL at 20:52

## 2019-08-29 RX ADMIN — OYSTER SHELL CALCIUM WITH VITAMIN D 1 TABLET: 500; 200 TABLET, FILM COATED ORAL at 08:36

## 2019-08-29 RX ADMIN — Medication 3 ML: at 20:52

## 2019-08-29 RX ADMIN — ANTICOAGULANT CITRATE DEXTROSE SOLUTION FORMULA A 1000 ML: 12.25; 11; 3.65 SOLUTION INTRAVENOUS at 15:50

## 2019-08-29 RX ADMIN — LABETALOL 20 MG/4 ML (5 MG/ML) INTRAVENOUS SYRINGE 10 MG: at 23:31

## 2019-08-29 RX ADMIN — GABAPENTIN 400 MG: 400 CAPSULE ORAL at 11:33

## 2019-08-29 RX ADMIN — BACLOFEN 5 MG: 10 TABLET ORAL at 11:55

## 2019-08-29 RX ADMIN — GABAPENTIN 400 MG: 400 CAPSULE ORAL at 08:36

## 2019-08-29 RX ADMIN — PREDNISONE 40 MG: 20 TABLET ORAL at 08:36

## 2019-08-29 RX ADMIN — CYANOCOBALAMIN TAB 250 MCG 500 MCG: 250 TAB at 08:35

## 2019-08-29 RX ADMIN — FOLIC ACID 1 MG: 1 TABLET ORAL at 08:36

## 2019-08-29 RX ADMIN — GABAPENTIN 400 MG: 400 CAPSULE ORAL at 20:52

## 2019-08-29 RX ADMIN — CALCIUM GLUCONATE: 98 INJECTION, SOLUTION INTRAVENOUS at 15:50

## 2019-08-29 RX ADMIN — OYSTER SHELL CALCIUM WITH VITAMIN D 1 TABLET: 500; 200 TABLET, FILM COATED ORAL at 20:52

## 2019-08-29 RX ADMIN — BUMETANIDE 1 MG: 1 TABLET ORAL at 08:36

## 2019-08-29 RX ADMIN — CLONAZEPAM 0.5 MG: 0.5 TABLET ORAL at 20:52

## 2019-08-29 RX ADMIN — FAMOTIDINE 20 MG: 20 TABLET, FILM COATED ORAL at 08:36

## 2019-08-29 RX ADMIN — BUPRENORPHINE HYDROCHLORIDE 4 MG: 2 TABLET SUBLINGUAL at 20:51

## 2019-08-29 RX ADMIN — MYCOPHENOLATE MOFETIL 500 MG: 250 CAPSULE ORAL at 20:52

## 2019-08-29 RX ADMIN — BUPRENORPHINE HYDROCHLORIDE 2 MG: 2 TABLET SUBLINGUAL at 09:09

## 2019-08-29 RX ADMIN — CARVEDILOL 12.5 MG: 6.25 TABLET, FILM COATED ORAL at 21:06

## 2019-08-30 ENCOUNTER — APPOINTMENT (OUTPATIENT)
Dept: LAB | Facility: HOSPITAL | Age: 39
End: 2019-08-30

## 2019-08-30 ENCOUNTER — APPOINTMENT (OUTPATIENT)
Dept: ONCOLOGY | Facility: CLINIC | Age: 39
End: 2019-08-30

## 2019-08-30 VITALS
BODY MASS INDEX: 25.12 KG/M2 | TEMPERATURE: 97.9 F | WEIGHT: 179.45 LBS | OXYGEN SATURATION: 96 % | DIASTOLIC BLOOD PRESSURE: 98 MMHG | HEART RATE: 90 BPM | HEIGHT: 71 IN | RESPIRATION RATE: 14 BRPM | SYSTOLIC BLOOD PRESSURE: 158 MMHG

## 2019-08-30 LAB
ALBUMIN SERPL-MCNC: 3.2 G/DL (ref 3.5–4.8)
ALBUMIN/GLOB SERPL: 1.7 G/DL (ref 1–1.7)
ALP SERPL-CCNC: 60 U/L (ref 32–91)
ALT SERPL W P-5'-P-CCNC: 15 U/L (ref 17–63)
ANION GAP SERPL CALCULATED.3IONS-SCNC: 17.6 MMOL/L (ref 5–15)
AST SERPL-CCNC: 18 U/L (ref 15–41)
BILIRUB SERPL-MCNC: 0.6 MG/DL (ref 0.3–1.2)
BUN BLD-MCNC: 58 MG/DL (ref 8–20)
BUN/CREAT SERPL: 36.3 (ref 6.2–20.3)
CA-I SERPL ISE-MCNC: 1.12 MMOL/L (ref 1.2–1.3)
CALCIUM SPEC-SCNC: 8.4 MG/DL (ref 8.9–10.3)
CHLORIDE SERPL-SCNC: 91 MMOL/L (ref 101–111)
CO2 SERPL-SCNC: 35 MMOL/L (ref 22–32)
CREAT BLD-MCNC: 1.6 MG/DL (ref 0.7–1.2)
DEPRECATED RDW RBC AUTO: 55.6 FL (ref 37–54)
ERYTHROCYTE [DISTWIDTH] IN BLOOD BY AUTOMATED COUNT: 18 % (ref 12.3–15.4)
GFR SERPL CREATININE-BSD FRML MDRD: 49 ML/MIN/1.73
GLOBULIN UR ELPH-MCNC: 1.9 GM/DL (ref 2.5–3.8)
GLUCOSE BLD-MCNC: 110 MG/DL (ref 65–99)
HAPTOGLOB SERPL-MCNC: 92 MG/DL (ref 36–195)
HCT VFR BLD AUTO: 28.4 % (ref 37.5–51)
HGB BLD-MCNC: 9 G/DL (ref 13–17.7)
MAGNESIUM SERPL-MCNC: 2 MG/DL (ref 1.8–2.5)
MCH RBC QN AUTO: 27.5 PG (ref 26.6–33)
MCHC RBC AUTO-ENTMCNC: 31.5 G/DL (ref 31.5–35.7)
MCV RBC AUTO: 87.4 FL (ref 79–97)
PHOSPHATE SERPL-MCNC: 3.4 MG/DL (ref 2.4–4.7)
PLATELET # BLD AUTO: 78 10*3/MM3 (ref 140–450)
PMV BLD AUTO: 7.7 FL (ref 6–12)
POTASSIUM BLD-SCNC: 3.6 MMOL/L (ref 3.6–5.1)
PROT SERPL-MCNC: 5.1 G/DL (ref 6.1–7.9)
RBC # BLD AUTO: 3.26 10*6/MM3 (ref 4.14–5.8)
SODIUM BLD-SCNC: 140 MMOL/L (ref 136–144)
WBC NRBC COR # BLD: 12.2 10*3/MM3 (ref 3.4–10.8)

## 2019-08-30 PROCEDURE — 25010000002 CALCIUM GLUCONATE PER 10 ML: Performed by: INTERNAL MEDICINE

## 2019-08-30 PROCEDURE — 86927 PLASMA FRESH FROZEN: CPT

## 2019-08-30 PROCEDURE — 84100 ASSAY OF PHOSPHORUS: CPT | Performed by: INTERNAL MEDICINE

## 2019-08-30 PROCEDURE — 83010 ASSAY OF HAPTOGLOBIN QUANT: CPT | Performed by: NURSE PRACTITIONER

## 2019-08-30 PROCEDURE — 63710000001 MYCOPHENOLATE MOFETIL PER 250 MG: Performed by: INTERNAL MEDICINE

## 2019-08-30 PROCEDURE — P9017 PLASMA 1 DONOR FRZ W/IN 8 HR: HCPCS

## 2019-08-30 PROCEDURE — 36430 TRANSFUSION BLD/BLD COMPNT: CPT

## 2019-08-30 PROCEDURE — 83735 ASSAY OF MAGNESIUM: CPT | Performed by: INTERNAL MEDICINE

## 2019-08-30 PROCEDURE — 82330 ASSAY OF CALCIUM: CPT | Performed by: INTERNAL MEDICINE

## 2019-08-30 PROCEDURE — 85027 COMPLETE CBC AUTOMATED: CPT | Performed by: INTERNAL MEDICINE

## 2019-08-30 PROCEDURE — 63710000001 DIPHENHYDRAMINE PER 50 MG: Performed by: INTERNAL MEDICINE

## 2019-08-30 PROCEDURE — 63710000001 PREDNISONE PER 1 MG: Performed by: INTERNAL MEDICINE

## 2019-08-30 PROCEDURE — 99239 HOSP IP/OBS DSCHRG MGMT >30: CPT | Performed by: INTERNAL MEDICINE

## 2019-08-30 PROCEDURE — 99233 SBSQ HOSP IP/OBS HIGH 50: CPT | Performed by: INTERNAL MEDICINE

## 2019-08-30 PROCEDURE — 25010000002 HYDRALAZINE PER 20 MG: Performed by: NURSE PRACTITIONER

## 2019-08-30 PROCEDURE — 80053 COMPREHEN METABOLIC PANEL: CPT | Performed by: INTERNAL MEDICINE

## 2019-08-30 RX ORDER — DIPHENHYDRAMINE HCL 25 MG
25 TABLET ORAL ONCE
Status: COMPLETED | OUTPATIENT
Start: 2019-08-30 | End: 2019-08-30

## 2019-08-30 RX ORDER — PREDNISONE 20 MG/1
40 TABLET ORAL
Qty: 28 TABLET | Refills: 1 | Status: SHIPPED | OUTPATIENT
Start: 2019-08-31 | End: 2019-09-14

## 2019-08-30 RX ORDER — CALCIUM GLUCONATE 20 MG/ML
1 INJECTION, SOLUTION INTRAVENOUS ONCE
Status: DISCONTINUED | OUTPATIENT
Start: 2019-08-30 | End: 2019-08-30

## 2019-08-30 RX ORDER — BUMETANIDE 1 MG/1
1 TABLET ORAL 2 TIMES DAILY
Qty: 60 TABLET | Refills: 0 | Status: SHIPPED | OUTPATIENT
Start: 2019-08-30 | End: 2019-09-29

## 2019-08-30 RX ORDER — ACETAMINOPHEN 325 MG/1
650 TABLET ORAL ONCE
Status: COMPLETED | OUTPATIENT
Start: 2019-08-30 | End: 2019-08-30

## 2019-08-30 RX ORDER — HYDRALAZINE HYDROCHLORIDE 20 MG/ML
10 INJECTION INTRAMUSCULAR; INTRAVENOUS ONCE
Status: COMPLETED | OUTPATIENT
Start: 2019-08-30 | End: 2019-08-30

## 2019-08-30 RX ORDER — BACLOFEN 20 MG/1
10 TABLET ORAL 3 TIMES DAILY PRN
Start: 2019-08-30

## 2019-08-30 RX ORDER — CARVEDILOL 25 MG/1
25 TABLET ORAL 2 TIMES DAILY WITH MEALS
Status: DISCONTINUED | OUTPATIENT
Start: 2019-08-30 | End: 2019-08-30 | Stop reason: HOSPADM

## 2019-08-30 RX ORDER — MYCOPHENOLATE MOFETIL 250 MG/1
500 CAPSULE ORAL EVERY 12 HOURS SCHEDULED
Qty: 120 CAPSULE | Refills: 1 | Status: SHIPPED | OUTPATIENT
Start: 2019-08-30 | End: 2019-09-29

## 2019-08-30 RX ORDER — BUPRENORPHINE HYDROCHLORIDE 8 MG/1
8 TABLET SUBLINGUAL 3 TIMES DAILY
Status: DISCONTINUED | OUTPATIENT
Start: 2019-08-30 | End: 2019-08-30 | Stop reason: HOSPADM

## 2019-08-30 RX ORDER — LIDOCAINE 50 MG/G
1 PATCH TOPICAL
Qty: 15 PATCH | Refills: 0 | Status: SHIPPED | OUTPATIENT
Start: 2019-08-31 | End: 2019-09-15

## 2019-08-30 RX ADMIN — HYDRALAZINE HYDROCHLORIDE 10 MG: 20 INJECTION INTRAMUSCULAR; INTRAVENOUS at 00:25

## 2019-08-30 RX ADMIN — FOLIC ACID 1 MG: 1 TABLET ORAL at 08:20

## 2019-08-30 RX ADMIN — OYSTER SHELL CALCIUM WITH VITAMIN D 1 TABLET: 500; 200 TABLET, FILM COATED ORAL at 08:20

## 2019-08-30 RX ADMIN — PREDNISONE 40 MG: 20 TABLET ORAL at 08:20

## 2019-08-30 RX ADMIN — LACTULOSE 20 G: 10 SOLUTION ORAL at 08:16

## 2019-08-30 RX ADMIN — Medication 3 ML: at 09:21

## 2019-08-30 RX ADMIN — CARVEDILOL 12.5 MG: 6.25 TABLET, FILM COATED ORAL at 08:20

## 2019-08-30 RX ADMIN — ANTICOAGULANT CITRATE DEXTROSE SOLUTION FORMULA A 1000 ML: 12.25; 11; 3.65 SOLUTION INTRAVENOUS at 14:00

## 2019-08-30 RX ADMIN — CYANOCOBALAMIN TAB 250 MCG 500 MCG: 250 TAB at 08:20

## 2019-08-30 RX ADMIN — CALCIUM GLUCONATE: 98 INJECTION, SOLUTION INTRAVENOUS at 14:00

## 2019-08-30 RX ADMIN — LACOSAMIDE 100 MG: 100 TABLET, FILM COATED ORAL at 08:20

## 2019-08-30 RX ADMIN — GABAPENTIN 400 MG: 400 CAPSULE ORAL at 08:20

## 2019-08-30 RX ADMIN — MYCOPHENOLATE MOFETIL 500 MG: 250 CAPSULE ORAL at 08:21

## 2019-08-30 RX ADMIN — GABAPENTIN 400 MG: 400 CAPSULE ORAL at 11:26

## 2019-08-30 RX ADMIN — BUMETANIDE 1 MG: 1 TABLET ORAL at 05:41

## 2019-08-30 RX ADMIN — ACETAMINOPHEN 650 MG: 325 TABLET ORAL at 13:15

## 2019-08-30 RX ADMIN — LEVETIRACETAM 500 MG: 500 TABLET, FILM COATED ORAL at 08:19

## 2019-08-30 RX ADMIN — BUPRENORPHINE HYDROCHLORIDE 4 MG: 2 TABLET SUBLINGUAL at 08:16

## 2019-08-30 RX ADMIN — DIPHENHYDRAMINE HCL 25 MG: 25 TABLET ORAL at 13:27

## 2019-08-30 RX ADMIN — HYDRALAZINE HYDROCHLORIDE 10 MG: 20 INJECTION INTRAMUSCULAR; INTRAVENOUS at 03:20

## 2019-08-30 RX ADMIN — BUPRENORPHINE HCL 8 MG: 8 TABLET SUBLINGUAL at 13:57

## 2019-08-30 RX ADMIN — FAMOTIDINE 20 MG: 20 TABLET, FILM COATED ORAL at 08:21

## 2019-08-30 RX ADMIN — CLONAZEPAM 0.5 MG: 0.5 TABLET ORAL at 08:16

## 2019-08-31 ENCOUNTER — READMISSION MANAGEMENT (OUTPATIENT)
Dept: CALL CENTER | Facility: HOSPITAL | Age: 39
End: 2019-08-31

## 2019-08-31 LAB
ABO + RH BLD: NORMAL
BH BB BLOOD EXPIRATION DATE: NORMAL
BH BB BLOOD TYPE BARCODE: 600
BH BB BLOOD TYPE BARCODE: 600
BH BB BLOOD TYPE BARCODE: 6200
BH BB BLOOD TYPE BARCODE: NORMAL
BH BB DISPENSE STATUS: NORMAL
BH BB PRODUCT CODE: NORMAL
BH BB UNIT NUMBER: NORMAL
UNIT  ABO: NORMAL
UNIT  RH: NORMAL

## 2019-08-31 NOTE — OUTREACH NOTE
Prep Survey      Responses   Facility patient discharged from?  [facility]   Is patient eligible?  Yes   Discharge diagnosis   Shiley catheter placement for plasmapheresis,  shortness of breath   Does the patient have one of the following disease processes/diagnoses(primary or secondary)?  Other   Does the patient have Home health ordered?  No   Is there a DME ordered?  No   Prep survey completed?  Yes          Jennifer Noriega RN

## 2019-09-03 NOTE — PROGRESS NOTES
Continued Stay Note  REINIER Ward     Patient Name: Lorenzo Crockett  MRN: [id number]  Today's Date: 9/3/2019    Admit Date: 8/21/2019    Discharge Plan     Row Name 09/03/19 0916       Plan    Final Discharge Disposition Code  01 - home or self-care            Expected Discharge Date and Time     Expected Discharge Date Expected Discharge Time    Aug 30, 2019             Camila Gentile RN

## 2019-09-05 ENCOUNTER — READMISSION MANAGEMENT (OUTPATIENT)
Dept: CALL CENTER | Facility: HOSPITAL | Age: 39
End: 2019-09-05

## 2019-09-05 NOTE — OUTREACH NOTE
Medical Week 1 Survey      Responses   Facility patient discharged from?  [facility]   Does the patient have one of the following disease processes/diagnoses(primary or secondary)?  Other   Is there a successful TCM telephone encounter documented?  No   Week 1 attempt successful?  No   Unsuccessful attempts  Attempt 4 [No answer/Left voicemail]          Kathryn Sanches LPN

## 2019-09-09 ENCOUNTER — TELEPHONE (OUTPATIENT)
Dept: ONCOLOGY | Facility: CLINIC | Age: 39
End: 2019-09-09

## 2019-09-12 ENCOUNTER — READMISSION MANAGEMENT (OUTPATIENT)
Dept: CALL CENTER | Facility: HOSPITAL | Age: 39
End: 2019-09-12

## 2019-09-12 NOTE — OUTREACH NOTE
Medical Week 2 Survey      Responses   Facility patient discharged from?  [facility]   Does the patient have one of the following disease processes/diagnoses(primary or secondary)?  Other   Week 2 attempt successful?  No   Revoke  Decline to participate [No answer/left voicemail]          Kathryn Sanches LPN

## 2019-09-18 ENCOUNTER — TELEPHONE (OUTPATIENT)
Dept: ONCOLOGY | Facility: CLINIC | Age: 39
End: 2019-09-18

## 2019-09-18 NOTE — TELEPHONE ENCOUNTER
Switchboard forwarded call from Mr. Crockett who wanted to schedule appt w/ Dr. Mak preferably for this week.  Appt scheduled for Friday.

## 2019-09-18 NOTE — TELEPHONE ENCOUNTER
Mr. Crockett called back to reschedule as his ride was not available on Friday.  Rescheduled for Tues.

## 2019-09-20 PROCEDURE — 86902 BLOOD TYPE ANTIGEN DONOR EA: CPT

## 2019-11-04 ENCOUNTER — TELEPHONE (OUTPATIENT)
Dept: ONCOLOGY | Facility: CLINIC | Age: 39
End: 2019-11-04

## 2019-11-04 NOTE — TELEPHONE ENCOUNTER
Lorenzo EVANS Called to see if Lorenzo ZHU's appt could be rescheduled so they could come in together to save on transportation.  Rescheduled for 11/13.

## 2019-11-04 NOTE — TELEPHONE ENCOUNTER
Received call via switchboard.  Pt wanted to schedule appt w/ Dr. Mak.  Appt scheduled for 11/14.  Pt repeated date/time of appt.

## 2019-11-12 NOTE — PROGRESS NOTES
Hematology/Oncology Outpatient Follow Up    PATIENT NAME:Lorenzo Crockett  :1980  MRN: [id number]  PRIMARY CARE PHYSICIAN: Kaleb Covarrubias MD  REFERRING PHYSICIAN: Kaleb Covarrubias,*    No chief complaint on file.       HISTORY OF PRESENT ILLNESS:   Patient is a 38-year-old male with a PMH of coagulopathy, pulmonary embolus, pulmonary infarction, S/P right thoracotomy with wedge resection and thrombectomy, LLE ulcer with MRSA infection, left common iliac stent, chronic pain, iron deficiency anemia, lupus, lupus nephritis, restless leg, thrombocytopenia, seizure disorder, depression and CKD who presented to the ER on 3/10/19 due to decreased urine output, decreased p.o. intake and medical noncompliance.  Labs in the ER showed a hemoglobin of 7.4, platelet count of 91k, WBC 8.5, potassium of 5.8, , creatinine 6.7,  and lipase of 1,332.  CT scan of abdomen w/o contrast showed cirrhotic liver, bilateral pleural effusions, ascites, anasarca, cardiomegaly and bibasilar ground-glass densities.  An ABG was obtained showing acute hypercapnic respiratory failure.  Patient was intubated and had placement of central line with IVF bolus and pressors started due to hypotension.  IV antibiotics were initiated.  The patient was admitted to the ICU with Nephrology consulted for TABBY vs. CKD progression and Neurology consulted for seizure history and decreased mental status.      On 3/11/19, Hem/Onc consulted for coagulopathy and Hx of PE and DVT on patient known to us and seen during previous hospital admissions by Dr. Mak.  He was seen by Dr. Mak in 2015 during a consultation for anemia.  Anemia w/u at that time was consistent with iron deficiency.  He was noted to have thrombocytopenia (plt 104K) that was felt to be due to medication [Risperidone].  There was concern for possible drug induced bone marrow suppression from his lupus treatment.  He had bulky chest/abd adenopathy and PET  scan was scheduled as an OP multiple times.  He never was seen in f/u.  MR review, in 2016, SPEP, folate were OK.  B12 was borderline and he had mild JUNG. Seen again in 7/2017 for thrombocytopenia when he was admitted for new onset seizures, fever and respiratory failure.  His thrombocytopenia stabilized and no etiology was detected.  He was continued on Arixtra for his h/o lupus AC.  His anemia was due to JUNG and ACD.  It was planned to start Procrit if needed as OP.  He was never seen in f/u.  Patient is currently intubated.  Hemoglobin 7.1, MCV 76, PLT 15K.  pRBC and PLT transfusion has been ordered.  Anemia w/u consistent with JUNG and GI has been consulted.   [Parts of the above copied and edited from previous admissions.]   • Patient was evaluated by Dr. Siddiqui with Rheumatology services.  He was diagnosed with catastrophic antiphospholipid syndrome.  Patient was initiated on plasma exchange x 5 total doses and high doses of Solu-Medrol transitioned into Prednisone as well.  Patient also required brief dialysis for acute on chronic kidney disease.  Patient’s renal function recovered and ranged between 1.8 to 2.2 while hospitalized.    • 3/11/19 - Hematologic parameters including haptoglobin, normal at 64.  .  Ferritin 32.  Complement C3, C4 was low.  Serum iron was low at 27.  .  Viral hepatitis panel was nonreactive.  B12 was more than 1,500.  Folate was more than 24.    • Patient was initiated on IV IG, received on 4/4, 4/5 and 4/8/19.  Patient received 40 gm.    • He received IV Injectafer on 4/4/19 and 4/15/19.       Past Medical History:   Diagnosis Date   • Blood clot associated with vein wall inflammation    • Cirrhosis of liver (CMS/HCC)    • Enlarged heart    • Histoplasmosis    • Hypertension    • Lupus    • Renal disorder     40% function       Past Surgical History:   Procedure Laterality Date   • BEDSIDE PARACENTESIS  8/22/2019        • FOOT SURGERY Left     toe removal   • IR STENT  PLACEMENT Left     leg   • LUNG REMOVAL, PARTIAL Right     right lower lobe   • LUNG SURGERY      clot removal         Current Outpatient Medications:   •  amLODIPine (NORVASC) 10 MG tablet, Take 10 mg by mouth Daily., Disp: , Rfl:   •  baclofen (LIORESAL) 20 MG tablet, Take 0.5 tablets by mouth 3 (Three) Times a Day As Needed for Muscle Spasms., Disp: , Rfl:   •  buprenorphine (SUBUTEX) 8 MG sublingual tablet SL tablet, Place 20 mg under the tongue Daily. Unknown if pt took dose 08/21/19 & quantity, Disp: , Rfl:   •  calcium carbonate (OS-BLU) 600 MG tablet, Take 600 mg by mouth 2 (Two) Times a Day., Disp: , Rfl:   •  carvedilol (COREG) 25 MG tablet, Take 25 mg by mouth 2 (Two) Times a Day With Meals., Disp: , Rfl:   •  clonazePAM (KlonoPIN) 0.5 MG tablet, Take 0.5 mg by mouth 2 (Two) Times a Day., Disp: , Rfl:   •  Cyanocobalamin (B-12) 500 MCG sublingual tablet, Place 1 tablet under the tongue 2 (Two) Times a Day., Disp: , Rfl:   •  famotidine (PEPCID) 20 MG tablet, Take 20 mg by mouth 2 (Two) Times a Day As Needed for Heartburn., Disp: , Rfl:   •  folic acid (FOLVITE) 1 MG tablet, Take 1 mg by mouth Daily., Disp: , Rfl:   •  fondaparinux (ARIXTRA) 7.5 MG/0.6ML solution injection, Inject 7.5 mg under the skin into the appropriate area as directed Daily., Disp: , Rfl:   •  gabapentin (NEURONTIN) 800 MG tablet, Take 800 mg by mouth 4 (Four) Times a Day., Disp: , Rfl:   •  lacosamide (VIMPAT) 100 MG tablet tablet, Take 100 mg by mouth 2 (Two) Times a Day., Disp: , Rfl:   •  levETIRAcetam (KEPPRA) 500 MG tablet, Take 500 mg by mouth 2 (Two) Times a Day., Disp: , Rfl:   •  QUEtiapine (SEROquel) 50 MG tablet, Take 50 mg by mouth Every Night., Disp: , Rfl:   •  rOPINIRole (REQUIP) 0.5 MG tablet, Take 0.5 mg by mouth Daily As Needed., Disp: , Rfl:   •  sertraline (ZOLOFT) 50 MG tablet, Take 50 mg by mouth Daily., Disp: , Rfl:     Allergies   Allergen Reactions   • Tramadol Other (See Comments)     seizure       No family  history on file.    Cancer-related family history is not on file.    Social History     Tobacco Use   • Smoking status: Never Smoker   • Smokeless tobacco: Never Used   Substance Use Topics   • Alcohol use: No     Frequency: Never   • Drug use: Defer       I have reviewed the history of present illness, past medical history, family history, social history, lab results, all notes and other records since the patient was last seen on 11/4/2019.    SUBJECTIVE:            REVIEW OF SYSTEMS:  Review of Systems    OBJECTIVE:    There were no vitals filed for this visit.    ECOG  {[facility] ECOG Status:48134}    Physical Exam    RECENT LABS  WBC   Date Value Ref Range Status   08/30/2019 12.20 (H) 3.40 - 10.80 10*3/mm3 Final     RBC   Date Value Ref Range Status   08/30/2019 3.26 (L) 4.14 - 5.80 10*6/mm3 Final     Hemoglobin   Date Value Ref Range Status   08/30/2019 9.0 (L) 13.0 - 17.7 g/dL Final     Hematocrit   Date Value Ref Range Status   08/30/2019 28.4 (L) 37.5 - 51.0 % Final     MCV   Date Value Ref Range Status   08/30/2019 87.4 79.0 - 97.0 fL Final     MCH   Date Value Ref Range Status   08/30/2019 27.5 26.6 - 33.0 pg Final     MCHC   Date Value Ref Range Status   08/30/2019 31.5 31.5 - 35.7 g/dL Final     RDW   Date Value Ref Range Status   08/30/2019 18.0 (H) 12.3 - 15.4 % Final     RDW-SD   Date Value Ref Range Status   08/30/2019 55.6 (H) 37.0 - 54.0 fl Final     MPV   Date Value Ref Range Status   08/30/2019 7.7 6.0 - 12.0 fL Final     Platelets   Date Value Ref Range Status   08/30/2019 78 (L) 140 - 450 10*3/mm3 Final     Neutrophil %   Date Value Ref Range Status   08/28/2019 76.6 (H) 42.7 - 76.0 % Final     Lymphocyte %   Date Value Ref Range Status   08/28/2019 14.9 (L) 19.6 - 45.3 % Final     Monocyte %   Date Value Ref Range Status   08/28/2019 8.4 5.0 - 12.0 % Final     Eosinophil %   Date Value Ref Range Status   08/28/2019 0.1 (L) 0.3 - 6.2 % Final     Basophil %   Date Value Ref Range  Status   08/28/2019 0.0 0.0 - 1.5 % Final     Immature Grans %   Date Value Ref Range Status   03/29/2019 0.7 (H) 0.0 - 0.5 % Final     Neutrophils, Absolute   Date Value Ref Range Status   08/28/2019 5.40 1.70 - 7.00 10*3/mm3 Final     Lymphocytes, Absolute   Date Value Ref Range Status   08/28/2019 1.10 0.70 - 3.10 10*3/mm3 Final     Monocytes, Absolute   Date Value Ref Range Status   08/28/2019 0.60 0.10 - 0.90 10*3/mm3 Final     Eosinophils, Absolute   Date Value Ref Range Status   08/28/2019 0.00 0.00 - 0.40 10*3/mm3 Final     Basophils, Absolute   Date Value Ref Range Status   08/28/2019 0.00 0.00 - 0.20 10*3/mm3 Final     Immature Grans, Absolute   Date Value Ref Range Status   03/29/2019 0.07 (H) 0.00 - 0.05 10*3/mm3 Final     nRBC   Date Value Ref Range Status   08/28/2019 0.1 0.0 - 0.2 /100 WBC Final       Lab Results   Component Value Date    GLUCOSE 110 (H) 08/30/2019    BUN 58 (H) 08/30/2019    CREATININE 1.60 (H) 08/30/2019    EGFRIFNONA 49 (L) 08/30/2019    EGFRIFAFRI 44 (L) 07/26/2017    BCR 36.3 (H) 08/30/2019    K 3.6 08/30/2019    CO2 35.0 (H) 08/30/2019    CALCIUM 8.4 (L) 08/30/2019    ALBUMIN 3.20 (L) 08/30/2019    LABIL2 1.7 03/29/2019    AST 18 08/30/2019    ALT 15 (L) 08/30/2019         Assessment/Plan     There are no diagnoses linked to this encounter.    ASSESSMENT:    1. Thrombophilia.  Patient has history of pulmonary embolism and deep venous thrombosis and history of antiphospholipid syndrome.  He has had IVC filter placement.  He has been on anticoagulation therapy, most recently Lovenox currently on hold due to ecchymosis.    2. Thrombocytopenia likely secondary to ITP due to underlying autoimmune disease, responding very well to high doses of steroids.  Currently on Prednisone 30 mg daily.  Other possible etiologies include cirrhosis of the liver with portal hypertension.  Patient’s baseline platelets run in the 100,000.    3. Microcytic anemia, multifactorial including iron  deficiency anemia.  Patient to see Gastroenterology in the outpatient setting.  Will complete iron replacement therapy with IV Injectafer.  Patient may benefit from Procrit as well.    4. History of cirrhosis of the liver with portal hypertension.    5. History of catastrophic antiphospholipid syndrome status post plasma exchange x 5 with high doses of steroids and recommended to receive outpatient IV Ig.    6. History of community acquired pneumonia, status post respiratory failure with intubation.  7. History of pulmonary hypertension.   8. Acute on chronic kidney disease, status post brief episode of dialysis.   9. History of pancreatitis.  10. History of epilepsy.   11. Significant ecchymosis on both upper extremities, resolved.     PLAN:     Patient received three days of IV IG as listed in the History of Present Illness.  He will continue on slow weaning off Prednisone.  He is currently on 25 mg daily with parameters to wean completely off.  A BMP was ordered today.  He will continue anticoagulation therapy, currently he is on Arixtra full doses at 7.5 daily.  Will discontinue his IV IG as he has finished systemic treatments.  I have given him referral to GI for cirrhosis of the liver and portal hypertension as well as iron deficiency anemia.  Once his IV iron has been administered, I will reevaluate patient in approximately four weeks.  If there is no improvement in his hemoglobin at that point, I will consider Procrit injection.  He will continue the rest of his supportive care.  He will call me for problems in the interim.          I have reviewed labs results, imaging, vitals, and medications with the patient today. Will follow up in *** months with ***.     I counselled Lorenzo of the risks of continuing to use tobacco and cessation.    During this visit, I spent 3-10 minutes counseling the patient regarding tobacco cessation.     Patient verbalized understanding and is in agreement of the above plan.

## 2019-11-13 ENCOUNTER — APPOINTMENT (OUTPATIENT)
Dept: ONCOLOGY | Facility: CLINIC | Age: 39
End: 2019-11-13

## 2019-11-13 ENCOUNTER — APPOINTMENT (OUTPATIENT)
Dept: LAB | Facility: HOSPITAL | Age: 39
End: 2019-11-13

## 2019-11-13 ENCOUNTER — TELEPHONE (OUTPATIENT)
Dept: ONCOLOGY | Facility: CLINIC | Age: 39
End: 2019-11-13

## 2019-11-13 NOTE — TELEPHONE ENCOUNTER
Landen's father, Landen VERA, left message son was ill and needed to reschedule his appt.  Appt to be coordinated w/ Landen VERA's appt.  Returned call, had to leave message to call back.

## 2019-12-03 ENCOUNTER — TELEPHONE (OUTPATIENT)
Dept: ONCOLOGY | Facility: CLINIC | Age: 39
End: 2019-12-03

## 2019-12-03 NOTE — TELEPHONE ENCOUNTER
Mr. Landen VERA left message to schedule appt for son as quickly as possible.  Returned call, could not schedule Landen ZHU and Landen VERA together until later, so scheduled Landen ZHU for 12/4.  Offered to refer to nursing line, but Landen VERA wanted Landen ZHU to be seen.

## 2019-12-03 NOTE — PROGRESS NOTES
Hematology/Oncology Outpatient Follow Up    PATIENT NAME:Lorenzo Crockett  :1980  MRN: [id number]  PRIMARY CARE PHYSICIAN: Kaleb Covarrubias MD  REFERRING PHYSICIAN: Kaleb Covarrubias,*    Chief Complaint   Patient presents with   • Follow-up     Thrombocytopenia   • Follow-up     Thrombophilia        HISTORY OF PRESENT ILLNESS:   Patient is a 38-year-old male with a PMH of coagulopathy, pulmonary embolus, pulmonary infarction, S/P right thoracotomy with wedge resection and thrombectomy, LLE ulcer with MRSA infection, left common iliac stent, chronic pain, iron deficiency anemia, lupus, lupus nephritis, restless leg, thrombocytopenia, seizure disorder, depression and CKD who presented to the ER on 3/10/19 due to decreased urine output, decreased p.o. intake and medical noncompliance.  Labs in the ER showed a hemoglobin of 7.4, platelet count of 91k, WBC 8.5, potassium of 5.8, , creatinine 6.7,  and lipase of 1,332.  CT scan of abdomen w/o contrast showed cirrhotic liver, bilateral pleural effusions, ascites, anasarca, cardiomegaly and bibasilar ground-glass densities.  An ABG was obtained showing acute hypercapnic respiratory failure.  Patient was intubated and had placement of central line with IVF bolus and pressors started due to hypotension.  IV antibiotics were initiated.  The patient was admitted to the ICU with Nephrology consulted for TABBY vs. CKD progression and Neurology consulted for seizure history and decreased mental status.      On 3/11/19, Hem/Onc consulted for coagulopathy and Hx of PE and DVT on patient known to us and seen during previous hospital admissions by Dr. Mak.  He was seen by Dr. Mak in 2015 during a consultation for anemia.  Anemia w/u at that time was consistent with iron deficiency.  He was noted to have thrombocytopenia (plt 104K) that was felt to be due to medication [Risperidone].  There was concern for possible drug induced bone marrow  suppression from his lupus treatment.  He had bulky chest/abd adenopathy and PET scan was scheduled as an OP multiple times.  He never was seen in f/u.  MR review, in 2016, SPEP, folate were OK.  B12 was borderline and he had mild JUNG. Seen again in 7/2017 for thrombocytopenia when he was admitted for new onset seizures, fever and respiratory failure.  His thrombocytopenia stabilized and no etiology was detected.  He was continued on Arixtra for his h/o lupus AC.  His anemia was due to JUNG and ACD.  It was planned to start Procrit if needed as OP.  He was never seen in f/u.  Patient is currently intubated.  Hemoglobin 7.1, MCV 76, PLT 15K.  pRBC and PLT transfusion has been ordered.  Anemia w/u consistent with JUNG and GI has been consulted.   [Parts of the above copied and edited from previous admissions.]   • Patient was evaluated by Dr. Siddiqui with Rheumatology services.  He was diagnosed with catastrophic antiphospholipid syndrome.  Patient was initiated on plasma exchange x 5 total doses and high doses of Solu-Medrol transitioned into Prednisone as well.  Patient also required brief dialysis for acute on chronic kidney disease.  Patient’s renal function recovered and ranged between 1.8 to 2.2 while hospitalized.    • 3/11/19 - Hematologic parameters including haptoglobin, normal at 64.  .  Ferritin 32.  Complement C3, C4 was low.  Serum iron was low at 27.  .  Viral hepatitis panel was nonreactive.  B12 was more than 1,500.  Folate was more than 24.    • Patient was initiated on IV IG, received on 4/4, 4/5 and 4/8/19.  Patient received 40 gm.    • He received IV Injectafer on 4/4/19 and 4/15/19.       Past Medical History:   Diagnosis Date   • Blood clot associated with vein wall inflammation    • Cirrhosis of liver (CMS/HCC)    • Enlarged heart    • Histoplasmosis    • Hypertension    • Lupus (CMS/HCC)    • Renal disorder     40% function       Past Surgical History:   Procedure Laterality Date   •  BEDSIDE PARACENTESIS  8/22/2019        • FOOT SURGERY Left     toe removal   • IR STENT PLACEMENT Left     leg   • LUNG REMOVAL, PARTIAL Right     right lower lobe   • LUNG SURGERY      clot removal         Current Outpatient Medications:   •  amLODIPine (NORVASC) 10 MG tablet, Take 10 mg by mouth Daily., Disp: , Rfl:   •  baclofen (LIORESAL) 20 MG tablet, Take 0.5 tablets by mouth 3 (Three) Times a Day As Needed for Muscle Spasms., Disp: , Rfl:   •  buprenorphine (SUBUTEX) 8 MG sublingual tablet SL tablet, Place 20 mg under the tongue Daily. Unknown if pt took dose 08/21/19 & quantity, Disp: , Rfl:   •  calcium carbonate (OS-BLU) 600 MG tablet, Take 600 mg by mouth 2 (Two) Times a Day., Disp: , Rfl:   •  carvedilol (COREG) 25 MG tablet, Take 25 mg by mouth 2 (Two) Times a Day With Meals., Disp: , Rfl:   •  clonazePAM (KlonoPIN) 0.5 MG tablet, Take 0.5 mg by mouth 2 (Two) Times a Day., Disp: , Rfl:   •  Cyanocobalamin (B-12) 500 MCG sublingual tablet, Place 1 tablet under the tongue 2 (Two) Times a Day., Disp: , Rfl:   •  famotidine (PEPCID) 20 MG tablet, Take 20 mg by mouth 2 (Two) Times a Day As Needed for Heartburn., Disp: , Rfl:   •  folic acid (FOLVITE) 1 MG tablet, Take 1 mg by mouth Daily., Disp: , Rfl:   •  fondaparinux (ARIXTRA) 7.5 MG/0.6ML solution injection, Inject 7.5 mg under the skin into the appropriate area as directed Daily., Disp: , Rfl:   •  gabapentin (NEURONTIN) 800 MG tablet, Take 800 mg by mouth 4 (Four) Times a Day., Disp: , Rfl:   •  lacosamide (VIMPAT) 100 MG tablet tablet, Take 100 mg by mouth 2 (Two) Times a Day., Disp: , Rfl:   •  levETIRAcetam (KEPPRA) 500 MG tablet, Take 500 mg by mouth 2 (Two) Times a Day., Disp: , Rfl:   •  rOPINIRole (REQUIP) 0.5 MG tablet, Take 0.5 mg by mouth Daily As Needed., Disp: , Rfl:   •  sertraline (ZOLOFT) 50 MG tablet, Take 50 mg by mouth Daily., Disp: , Rfl:   •  QUEtiapine (SEROquel) 50 MG tablet, Take 50 mg by mouth Every Night., Disp: , Rfl:      Allergies   Allergen Reactions   • Tramadol Other (See Comments)     seizure   • Melon Rash       No family history on file.    Cancer-related family history is not on file.    Social History     Tobacco Use   • Smoking status: Never Smoker   • Smokeless tobacco: Current User     Types: Chew   Substance Use Topics   • Alcohol use: No     Frequency: Never   • Drug use: Yes     Types: Marijuana     Comment: occ.        I have reviewed the history of present illness, past medical history, family history, social history, lab results, all notes and other records since the patient was last seen on 11/4/2019.    SUBJECTIVE:  The patient is here for a follow up appointment.  The patient is accompanied with his mother for this appointment.  The patient's mother states that when the patient is not oxygen, his oxygen saturations decrease to the 60's.  The patient states that he has chest pain.  He states that he has increased his oxygen from 2 liters to 3 liters to keep his oxygen saturation to 90%.           REVIEW OF SYSTEMS:  Review of Systems   Constitutional: Positive for appetite change and fatigue. Negative for chills and fever.   HENT: Negative for ear pain, mouth sores, nosebleeds and sore throat.    Eyes: Negative for photophobia and visual disturbance.   Respiratory: Positive for shortness of breath. Negative for wheezing and stridor.    Cardiovascular: Negative for chest pain and palpitations.   Gastrointestinal: Positive for abdominal distention. Negative for abdominal pain, diarrhea, nausea and vomiting.   Endocrine: Negative for cold intolerance and heat intolerance.   Genitourinary: Negative for dysuria and hematuria.   Musculoskeletal: Negative for joint swelling and neck stiffness.   Skin: Negative for color change and rash.   Neurological: Positive for weakness. Negative for seizures and syncope.   Hematological: Negative for adenopathy.        No obvious bleeding   Psychiatric/Behavioral: Negative for  "agitation, confusion and hallucinations.       OBJECTIVE:    Vitals:    12/04/19 1607   BP: 105/67   Pulse: 59   Resp: 18   Temp: 97.5 °F (36.4 °C)   SpO2: 98%   Weight: 83.1 kg (183 lb 3.2 oz)   Height: 185.4 cm (73\")   PainSc:   8   PainLoc: Chest  Comment: & back. hurts to breathe.       ECOG  (2) Ambulatory and capable of self care, unable to carry out work activity, up and about > 50% or waking hours    Physical Exam   Constitutional: He is oriented to person, place, and time. No distress.   HENT:   Head: Normocephalic and atraumatic.   Eyes: Conjunctivae and EOM are normal. Right eye exhibits no discharge. Left eye exhibits no discharge. No scleral icterus.   Neck: Normal range of motion. Neck supple. No thyromegaly present.   Cardiovascular: Normal rate, regular rhythm and normal heart sounds. Exam reveals no gallop and no friction rub.   Pulmonary/Chest: Effort normal. No stridor. No respiratory distress. He has no wheezes.   Abdominal: Soft. Bowel sounds are normal. He exhibits distension (soft). He exhibits no mass. There is no tenderness. There is no rebound and no guarding.   Musculoskeletal: Normal range of motion. He exhibits no tenderness.   Lymphadenopathy:     He has no cervical adenopathy.   Neurological: He is alert and oriented to person, place, and time. He exhibits normal muscle tone.   Skin: Skin is warm. No rash noted. He is not diaphoretic. No erythema.   Psychiatric: He has a normal mood and affect. His behavior is normal.   Nursing note and vitals reviewed.      RECENT LABS  WBC   Date Value Ref Range Status   12/04/2019 6.49 3.40 - 10.80 10*3/mm3 Final     RBC   Date Value Ref Range Status   12/04/2019 2.89 (L) 4.14 - 5.80 10*6/mm3 Final     Hemoglobin   Date Value Ref Range Status   12/04/2019 8.4 (L) 13.0 - 17.7 g/dL Final     Hematocrit   Date Value Ref Range Status   12/04/2019 26.9 (L) 37.5 - 51.0 % Final     MCV   Date Value Ref Range Status   12/04/2019 93.1 79.0 - 97.0 fL Final "     MCH   Date Value Ref Range Status   12/04/2019 29.1 26.6 - 33.0 pg Final     MCHC   Date Value Ref Range Status   12/04/2019 31.2 (L) 31.5 - 35.7 g/dL Final     RDW   Date Value Ref Range Status   12/04/2019 14.5 12.3 - 15.4 % Final     RDW-SD   Date Value Ref Range Status   12/04/2019 45.6 37.0 - 54.0 fl Final     MPV   Date Value Ref Range Status   12/04/2019 10.4 6.0 - 12.0 fL Final     Platelets   Date Value Ref Range Status   12/04/2019 89 (L) 140 - 450 10*3/mm3 Final     Neutrophil %   Date Value Ref Range Status   12/04/2019 61.6 42.7 - 76.0 % Final     Lymphocyte %   Date Value Ref Range Status   12/04/2019 19.4 (L) 19.6 - 45.3 % Final     Monocyte %   Date Value Ref Range Status   12/04/2019 7.6 5.0 - 12.0 % Final     Eosinophil %   Date Value Ref Range Status   12/04/2019 10.8 (H) 0.3 - 6.2 % Final     Basophil %   Date Value Ref Range Status   12/04/2019 0.6 0.0 - 1.5 % Final     Immature Grans %   Date Value Ref Range Status   03/29/2019 0.7 (H) 0.0 - 0.5 % Final     Neutrophils, Absolute   Date Value Ref Range Status   12/04/2019 4.00 1.70 - 7.00 10*3/mm3 Final     Lymphocytes, Absolute   Date Value Ref Range Status   12/04/2019 1.26 0.70 - 3.10 10*3/mm3 Final     Monocytes, Absolute   Date Value Ref Range Status   12/04/2019 0.49 0.10 - 0.90 10*3/mm3 Final     Eosinophils, Absolute   Date Value Ref Range Status   12/04/2019 0.70 (H) 0.00 - 0.40 10*3/mm3 Final     Basophils, Absolute   Date Value Ref Range Status   12/04/2019 0.04 0.00 - 0.20 10*3/mm3 Final     Immature Grans, Absolute   Date Value Ref Range Status   03/29/2019 0.07 (H) 0.00 - 0.05 10*3/mm3 Final     nRBC   Date Value Ref Range Status   08/28/2019 0.1 0.0 - 0.2 /100 WBC Final       Lab Results   Component Value Date    GLUCOSE 110 (H) 08/30/2019    BUN 58 (H) 08/30/2019    CREATININE 1.60 (H) 08/30/2019    EGFRIFNONA 49 (L) 08/30/2019    EGFRIFAFRI 44 (L) 07/26/2017    BCR 36.3 (H) 08/30/2019    K 3.6 08/30/2019    CO2 35.0 (H)  08/30/2019    CALCIUM 8.4 (L) 08/30/2019    ALBUMIN 3.20 (L) 08/30/2019    LABIL2 1.7 03/29/2019    AST 18 08/30/2019    ALT 15 (L) 08/30/2019         Assessment/Plan     Anemia, unspecified type  - Iron Profile  - Ferritin      ASSESSMENT:    1. Thrombophilia.  Patient has history of pulmonary embolism and deep venous thrombosis and history of antiphospholipid syndrome.  He has had IVC filter placement.  He has been on anticoagulation therapy, most recently Lovenox currently on hold due to ecchymosis.    2. Thrombocytopenia likely secondary to ITP due to underlying autoimmune disease, responding very well to high doses of steroids.  Currently on Prednisone 30 mg daily.  Other possible etiologies include cirrhosis of the liver with portal hypertension.  Patient’s baseline platelets run in the 100,000.    3. Microcytic anemia, multifactorial including iron deficiency anemia.  Patient to see Gastroenterology in the outpatient setting.  Will complete iron replacement therapy with IV Injectafer.  Patient may benefit from Procrit as well.    4. History of cirrhosis of the liver with portal hypertension.    5. History of catastrophic antiphospholipid syndrome status post plasma exchange x 5 with high doses of steroids and recommended to receive outpatient IV Ig.    6. History of community acquired pneumonia, status post respiratory failure with intubation.  7. History of pulmonary hypertension.   8. Acute on chronic kidney disease, status post brief episode of dialysis.   9. History of pancreatitis.  10. History of epilepsy.   11. Significant ecchymosis on both upper extremities, resolved.   12. Hypoxia: to ER  13. Recurrent anemia    PLANS:    Hypoxia recommended that patient goes to the emergency room to be evaluated.  Iron studies will be done today.  Patient was diagnosed with B12 deficiency in August 2019. We will start B12 replacement therapy  Currently he is on Arixtra full doses at 7.5 daily.    Follow-up with GI  for cirrhosis of the liver and portal hypertension as well as iron deficiency anemia.   I will consider Procrit injection in the future given that he has renal insufficiency.    He will continue the rest of his supportive care.  He will call me for problems in the interim.     Discussed with patient and his mother who is here today       I have reviewed labs results, imaging, vitals, and medications with the patient today. Will follow up in 2 weeks with me .    Patient verbalized understanding and is in agreement of the above plan.    Part of this document was scribed by Beverly Mckeon, SABINO, BSN.

## 2019-12-04 ENCOUNTER — APPOINTMENT (OUTPATIENT)
Dept: LAB | Facility: HOSPITAL | Age: 39
End: 2019-12-04

## 2019-12-04 ENCOUNTER — OFFICE VISIT (OUTPATIENT)
Dept: ONCOLOGY | Facility: CLINIC | Age: 39
End: 2019-12-04

## 2019-12-04 VITALS
DIASTOLIC BLOOD PRESSURE: 67 MMHG | BODY MASS INDEX: 24.28 KG/M2 | RESPIRATION RATE: 18 BRPM | SYSTOLIC BLOOD PRESSURE: 105 MMHG | HEART RATE: 59 BPM | TEMPERATURE: 97.5 F | WEIGHT: 183.2 LBS | OXYGEN SATURATION: 98 % | HEIGHT: 73 IN

## 2019-12-04 DIAGNOSIS — D64.9 ANEMIA, UNSPECIFIED TYPE: Primary | ICD-10-CM

## 2019-12-04 LAB
FERRITIN SERPL-MCNC: 279 NG/ML (ref 30–400)
IRON 24H UR-MRATE: 33 MCG/DL (ref 59–158)
IRON SATN MFR SERPL: 13 % (ref 20–50)
TIBC SERPL-MCNC: 252 MCG/DL (ref 298–536)
TRANSFERRIN SERPL-MCNC: 169 MG/DL (ref 200–360)

## 2019-12-04 PROCEDURE — 82728 ASSAY OF FERRITIN: CPT | Performed by: NURSE PRACTITIONER

## 2019-12-04 PROCEDURE — 36415 COLL VENOUS BLD VENIPUNCTURE: CPT | Performed by: INTERNAL MEDICINE

## 2019-12-04 PROCEDURE — 80053 COMPREHEN METABOLIC PANEL: CPT | Performed by: NURSE PRACTITIONER

## 2019-12-04 PROCEDURE — 83540 ASSAY OF IRON: CPT | Performed by: NURSE PRACTITIONER

## 2019-12-04 PROCEDURE — 99215 OFFICE O/P EST HI 40 MIN: CPT | Performed by: INTERNAL MEDICINE

## 2019-12-04 PROCEDURE — 84466 ASSAY OF TRANSFERRIN: CPT | Performed by: NURSE PRACTITIONER

## 2019-12-05 NOTE — PROGRESS NOTES
Received message in secure chat from Beverly Mckeon RN stating that Dr. Mak would like to have a treatment plan placed on the patient for monthly B12 injections.  Treatment plan entered.

## 2019-12-06 ENCOUNTER — APPOINTMENT (OUTPATIENT)
Dept: GENERAL RADIOLOGY | Facility: HOSPITAL | Age: 39
End: 2019-12-06

## 2019-12-06 ENCOUNTER — APPOINTMENT (OUTPATIENT)
Dept: ULTRASOUND IMAGING | Facility: HOSPITAL | Age: 39
End: 2019-12-06

## 2019-12-06 ENCOUNTER — HOSPITAL ENCOUNTER (INPATIENT)
Facility: HOSPITAL | Age: 39
LOS: 6 days | Discharge: HOME | End: 2019-12-13
Attending: EMERGENCY MEDICINE | Admitting: INTERNAL MEDICINE

## 2019-12-06 DIAGNOSIS — N17.9 ACUTE RENAL FAILURE SUPERIMPOSED ON CHRONIC KIDNEY DISEASE, UNSPECIFIED CKD STAGE, UNSPECIFIED ACUTE RENAL FAILURE TYPE (HCC): Primary | ICD-10-CM

## 2019-12-06 DIAGNOSIS — D64.9 ANEMIA, UNSPECIFIED TYPE: ICD-10-CM

## 2019-12-06 DIAGNOSIS — N18.9 ACUTE RENAL FAILURE SUPERIMPOSED ON CHRONIC KIDNEY DISEASE, UNSPECIFIED CKD STAGE, UNSPECIFIED ACUTE RENAL FAILURE TYPE (HCC): Primary | ICD-10-CM

## 2019-12-06 PROBLEM — D63.8 ANEMIA, CHRONIC DISEASE: Chronic | Status: ACTIVE | Noted: 2019-12-06

## 2019-12-06 PROBLEM — S98.919A AMPUTATION OF FOOT (HCC): Chronic | Status: ACTIVE | Noted: 2019-12-06

## 2019-12-06 PROBLEM — J96.11 CHRONIC RESPIRATORY FAILURE WITH HYPOXIA (HCC): Status: ACTIVE | Noted: 2019-12-06

## 2019-12-06 PROBLEM — R18.8 OTHER ASCITES: Status: ACTIVE | Noted: 2019-12-06

## 2019-12-06 PROBLEM — E87.5 HYPERKALEMIA: Status: ACTIVE | Noted: 2019-12-06

## 2019-12-06 LAB
ALBUMIN SERPL-MCNC: 3.2 G/DL (ref 3.5–5.2)
ALP SERPL-CCNC: 95 U/L (ref 39–117)
ALT SERPL W P-5'-P-CCNC: 7 U/L (ref 1–41)
ANION GAP SERPL CALCULATED.3IONS-SCNC: 14 MMOL/L (ref 5–15)
AST SERPL-CCNC: 7 U/L (ref 1–40)
BASOPHILS # BLD AUTO: 0 10*3/MM3 (ref 0–0.2)
BASOPHILS NFR BLD AUTO: 0.3 % (ref 0–1.5)
BILIRUB CONJ SERPL-MCNC: <0.2 MG/DL (ref 0.2–0.3)
BILIRUB INDIRECT SERPL-MCNC: ABNORMAL MG/DL
BILIRUB SERPL-MCNC: <0.2 MG/DL (ref 0.2–1.2)
BUN BLD-MCNC: 96 MG/DL (ref 6–20)
BUN/CREAT SERPL: 24.5 (ref 7–25)
CALCIUM SPEC-SCNC: 8.4 MG/DL (ref 8.6–10.5)
CHLORIDE SERPL-SCNC: 94 MMOL/L (ref 98–107)
CO2 SERPL-SCNC: 26 MMOL/L (ref 22–29)
CREAT BLD-MCNC: 3.92 MG/DL (ref 0.76–1.27)
DEPRECATED RDW RBC AUTO: 49.9 FL (ref 37–54)
EOSINOPHIL # BLD AUTO: 0.7 10*3/MM3 (ref 0–0.4)
EOSINOPHIL NFR BLD AUTO: 10.4 % (ref 0.3–6.2)
ERYTHROCYTE [DISTWIDTH] IN BLOOD BY AUTOMATED COUNT: 15.4 % (ref 12.3–15.4)
GFR SERPL CREATININE-BSD FRML MDRD: 17 ML/MIN/1.73
GLUCOSE BLD-MCNC: 99 MG/DL (ref 65–99)
HCT VFR BLD AUTO: 26.6 % (ref 37.5–51)
HGB BLD-MCNC: 8.6 G/DL (ref 13–17.7)
LYMPHOCYTES # BLD AUTO: 1 10*3/MM3 (ref 0.7–3.1)
LYMPHOCYTES NFR BLD AUTO: 15.8 % (ref 19.6–45.3)
MCH RBC QN AUTO: 29.3 PG (ref 26.6–33)
MCHC RBC AUTO-ENTMCNC: 32.3 G/DL (ref 31.5–35.7)
MCV RBC AUTO: 90.7 FL (ref 79–97)
MONOCYTES # BLD AUTO: 0.5 10*3/MM3 (ref 0.1–0.9)
MONOCYTES NFR BLD AUTO: 7.5 % (ref 5–12)
NEUTROPHILS # BLD AUTO: 4.3 10*3/MM3 (ref 1.7–7)
NEUTROPHILS NFR BLD AUTO: 66 % (ref 42.7–76)
NRBC BLD AUTO-RTO: 0.1 /100 WBC (ref 0–0.2)
PLATELET # BLD AUTO: 118 10*3/MM3 (ref 140–450)
PMV BLD AUTO: 8 FL (ref 6–12)
POTASSIUM BLD-SCNC: 5 MMOL/L (ref 3.5–5.2)
PROT SERPL-MCNC: 6.7 G/DL (ref 6–8.5)
RBC # BLD AUTO: 2.93 10*6/MM3 (ref 4.14–5.8)
SODIUM BLD-SCNC: 134 MMOL/L (ref 136–145)
WBC NRBC COR # BLD: 6.6 10*3/MM3 (ref 3.4–10.8)

## 2019-12-06 PROCEDURE — 99284 EMERGENCY DEPT VISIT MOD MDM: CPT

## 2019-12-06 PROCEDURE — 80076 HEPATIC FUNCTION PANEL: CPT | Performed by: EMERGENCY MEDICINE

## 2019-12-06 PROCEDURE — 80048 BASIC METABOLIC PNL TOTAL CA: CPT | Performed by: EMERGENCY MEDICINE

## 2019-12-06 PROCEDURE — 25010000002 ALBUMIN HUMAN 25% PER 50 ML: Performed by: INTERNAL MEDICINE

## 2019-12-06 PROCEDURE — P9047 ALBUMIN (HUMAN), 25%, 50ML: HCPCS | Performed by: INTERNAL MEDICINE

## 2019-12-06 PROCEDURE — G0378 HOSPITAL OBSERVATION PER HR: HCPCS

## 2019-12-06 PROCEDURE — 85025 COMPLETE CBC W/AUTO DIFF WBC: CPT | Performed by: EMERGENCY MEDICINE

## 2019-12-06 PROCEDURE — 99223 1ST HOSP IP/OBS HIGH 75: CPT | Performed by: INTERNAL MEDICINE

## 2019-12-06 PROCEDURE — 71045 X-RAY EXAM CHEST 1 VIEW: CPT

## 2019-12-06 PROCEDURE — 76775 US EXAM ABDO BACK WALL LIM: CPT

## 2019-12-06 RX ORDER — SODIUM CHLORIDE 0.9 % (FLUSH) 0.9 %
10 SYRINGE (ML) INJECTION AS NEEDED
Status: DISCONTINUED | OUTPATIENT
Start: 2019-12-06 | End: 2019-12-13 | Stop reason: HOSPADM

## 2019-12-06 RX ORDER — PROCHLORPERAZINE MALEATE 5 MG/1
5 TABLET ORAL EVERY 8 HOURS PRN
Status: DISCONTINUED | OUTPATIENT
Start: 2019-12-06 | End: 2019-12-13 | Stop reason: HOSPADM

## 2019-12-06 RX ORDER — ONDANSETRON 2 MG/ML
4 INJECTION INTRAMUSCULAR; INTRAVENOUS EVERY 6 HOURS PRN
Status: DISCONTINUED | OUTPATIENT
Start: 2019-12-06 | End: 2019-12-13 | Stop reason: HOSPADM

## 2019-12-06 RX ORDER — FERROUS SULFATE TAB EC 324 MG (65 MG FE EQUIVALENT) 324 (65 FE) MG
324 TABLET DELAYED RESPONSE ORAL DAILY
COMMUNITY

## 2019-12-06 RX ORDER — SODIUM CHLORIDE 9 MG/ML
100 INJECTION, SOLUTION INTRAVENOUS CONTINUOUS
Status: DISCONTINUED | OUTPATIENT
Start: 2019-12-06 | End: 2019-12-07

## 2019-12-06 RX ORDER — ROPINIROLE 0.25 MG/1
0.5 TABLET, FILM COATED ORAL DAILY PRN
Status: DISCONTINUED | OUTPATIENT
Start: 2019-12-06 | End: 2019-12-13 | Stop reason: HOSPADM

## 2019-12-06 RX ORDER — ALBUMIN (HUMAN) 12.5 G/50ML
25 SOLUTION INTRAVENOUS EVERY 8 HOURS
Status: COMPLETED | OUTPATIENT
Start: 2019-12-06 | End: 2019-12-07

## 2019-12-06 RX ORDER — PROCHLORPERAZINE MALEATE 5 MG/1
5 TABLET ORAL EVERY 8 HOURS PRN
COMMUNITY

## 2019-12-06 RX ORDER — ONDANSETRON 4 MG/1
4 TABLET, FILM COATED ORAL EVERY 6 HOURS PRN
Status: DISCONTINUED | OUTPATIENT
Start: 2019-12-06 | End: 2019-12-13 | Stop reason: HOSPADM

## 2019-12-06 RX ORDER — FERROUS SULFATE TAB EC 324 MG (65 MG FE EQUIVALENT) 324 (65 FE) MG
324 TABLET DELAYED RESPONSE ORAL DAILY
Status: DISCONTINUED | OUTPATIENT
Start: 2019-12-07 | End: 2019-12-07

## 2019-12-06 RX ORDER — BUMETANIDE 1 MG/1
1 TABLET ORAL 2 TIMES DAILY
Status: ON HOLD | COMMUNITY
End: 2019-12-13 | Stop reason: SDUPTHER

## 2019-12-06 RX ORDER — CLONIDINE HYDROCHLORIDE 0.1 MG/1
0.1 TABLET ORAL 3 TIMES DAILY
COMMUNITY
End: 2019-12-13 | Stop reason: HOSPADM

## 2019-12-06 RX ORDER — FONDAPARINUX SODIUM 7.5 MG/.6ML
7.5 INJECTION SUBCUTANEOUS DAILY
Status: DISCONTINUED | OUTPATIENT
Start: 2019-12-07 | End: 2019-12-13 | Stop reason: HOSPADM

## 2019-12-06 RX ORDER — FAMOTIDINE 20 MG/1
20 TABLET, FILM COATED ORAL 2 TIMES DAILY PRN
Status: DISCONTINUED | OUTPATIENT
Start: 2019-12-06 | End: 2019-12-13 | Stop reason: HOSPADM

## 2019-12-06 RX ORDER — SODIUM CHLORIDE 0.9 % (FLUSH) 0.9 %
10 SYRINGE (ML) INJECTION EVERY 12 HOURS SCHEDULED
Status: DISCONTINUED | OUTPATIENT
Start: 2019-12-06 | End: 2019-12-13 | Stop reason: HOSPADM

## 2019-12-06 RX ORDER — CALCIUM CARBONATE 200(500)MG
1 TABLET,CHEWABLE ORAL 2 TIMES DAILY PRN
Status: DISCONTINUED | OUTPATIENT
Start: 2019-12-06 | End: 2019-12-13 | Stop reason: HOSPADM

## 2019-12-06 RX ORDER — OXYCODONE HYDROCHLORIDE 5 MG/1
10 TABLET ORAL EVERY 8 HOURS PRN
Status: DISCONTINUED | OUTPATIENT
Start: 2019-12-06 | End: 2019-12-09

## 2019-12-06 RX ORDER — NITROGLYCERIN 0.4 MG/1
0.4 TABLET SUBLINGUAL
Status: DISCONTINUED | OUTPATIENT
Start: 2019-12-06 | End: 2019-12-13 | Stop reason: HOSPADM

## 2019-12-06 RX ORDER — FOLIC ACID 1 MG/1
1 TABLET ORAL DAILY
Status: DISCONTINUED | OUTPATIENT
Start: 2019-12-07 | End: 2019-12-13 | Stop reason: HOSPADM

## 2019-12-06 RX ORDER — OXYCODONE HYDROCHLORIDE 5 MG/1
10 TABLET ORAL EVERY 4 HOURS PRN
Status: DISCONTINUED | OUTPATIENT
Start: 2019-12-06 | End: 2019-12-06

## 2019-12-06 RX ORDER — BISACODYL 5 MG/1
5 TABLET, DELAYED RELEASE ORAL DAILY PRN
Status: DISCONTINUED | OUTPATIENT
Start: 2019-12-06 | End: 2019-12-13 | Stop reason: HOSPADM

## 2019-12-06 RX ORDER — BISACODYL 10 MG
10 SUPPOSITORY, RECTAL RECTAL DAILY PRN
Status: DISCONTINUED | OUTPATIENT
Start: 2019-12-06 | End: 2019-12-13 | Stop reason: HOSPADM

## 2019-12-06 RX ORDER — CLONAZEPAM 0.5 MG/1
0.5 TABLET ORAL 2 TIMES DAILY
Status: DISCONTINUED | OUTPATIENT
Start: 2019-12-07 | End: 2019-12-13 | Stop reason: HOSPADM

## 2019-12-06 RX ORDER — CHOLECALCIFEROL (VITAMIN D3) 125 MCG
5 CAPSULE ORAL NIGHTLY PRN
Status: DISCONTINUED | OUTPATIENT
Start: 2019-12-06 | End: 2019-12-13 | Stop reason: HOSPADM

## 2019-12-06 RX ORDER — GABAPENTIN 300 MG/1
300 CAPSULE ORAL EVERY 8 HOURS SCHEDULED
Status: DISCONTINUED | OUTPATIENT
Start: 2019-12-07 | End: 2019-12-13 | Stop reason: HOSPADM

## 2019-12-06 RX ORDER — BACLOFEN 10 MG/1
10 TABLET ORAL 3 TIMES DAILY PRN
Status: DISCONTINUED | OUTPATIENT
Start: 2019-12-06 | End: 2019-12-11

## 2019-12-06 RX ORDER — LEVETIRACETAM 500 MG/1
500 TABLET ORAL EVERY 12 HOURS SCHEDULED
Status: DISCONTINUED | OUTPATIENT
Start: 2019-12-07 | End: 2019-12-13 | Stop reason: HOSPADM

## 2019-12-06 RX ORDER — CARVEDILOL 6.25 MG/1
6.25 TABLET ORAL 2 TIMES DAILY WITH MEALS
Status: DISCONTINUED | OUTPATIENT
Start: 2019-12-07 | End: 2019-12-07

## 2019-12-06 RX ORDER — LACOSAMIDE 100 MG/1
100 TABLET ORAL 2 TIMES DAILY
Status: DISCONTINUED | OUTPATIENT
Start: 2019-12-07 | End: 2019-12-13 | Stop reason: HOSPADM

## 2019-12-06 RX ORDER — QUETIAPINE FUMARATE 25 MG/1
50 TABLET, FILM COATED ORAL NIGHTLY PRN
Status: DISCONTINUED | OUTPATIENT
Start: 2019-12-06 | End: 2019-12-11

## 2019-12-06 RX ADMIN — SODIUM CHLORIDE 500 ML: 900 INJECTION, SOLUTION INTRAVENOUS at 16:52

## 2019-12-06 RX ADMIN — SODIUM CHLORIDE 100 ML/HR: 0.9 INJECTION, SOLUTION INTRAVENOUS at 22:16

## 2019-12-06 RX ADMIN — ALBUMIN HUMAN 25 G: 0.25 SOLUTION INTRAVENOUS at 23:50

## 2019-12-06 NOTE — ASSESSMENT & PLAN NOTE
On clonidine, coreg and amlodipine and bumex home - held except low dose coreg  bp improved, off midodrine

## 2019-12-06 NOTE — ASSESSMENT & PLAN NOTE
On subotex due to addiction issue initially but due to pain control also since he had 2 major lung surgery  Followed by pain clinic- oxy was discontinued  But on Subotex, ketamine     Re-Consult to pain clinic - not available  Watch for constipation for opiate related  Reported taking his own pain med in bag pack- taken away yesterday- since then, confused, suspicious for withdrawal  Will dc ketamine for suspicious side effect - agitation and aggression am

## 2019-12-06 NOTE — ASSESSMENT & PLAN NOTE
Multiple recurrent PE and DVT -s/p lobectomy, both lung due to clots    S/p plasmapheresis, iv IG, high dose steroid, this year   On arixtra- initially coumadin and lovenox  Dr. Mak - hematologist  Not following with rheumatology

## 2019-12-06 NOTE — ASSESSMENT & PLAN NOTE
Etiology; prerenal vs renal /possible lupus/MERYL flare up on CKD stage 3 per renal- on pulse dose steroid for 3 days solumedrol- changed to po prednisone daily  Recently completed zithromax for 5 days for URI sx  Non oliguric per pt  Urine lyte   Hypotensive- on multiple regimen home, hold amlodipine, clonidine but continue on Coreg at low dose  Renal US - no obstruction but mild cortical thinning, echogenic kidney  Fluid and albumin 25g x2    Nephrology on board  Cr trending down  Slowly - had paracentesis

## 2019-12-06 NOTE — ED PROVIDER NOTES
Subjective   History of Present Illness  Abnormal labs  39-year-old male sent to the hospital from the cancer care center for abnormal labs that were drawn 2 days ago.  Reportedly had elevated potassium.  He has a history of some renal insufficiency.  He states he has been making his normal amount of urine over the last week.  He reports no fevers or chills or abdominal pain .  Review of Systems   Constitutional: Negative.    HENT: Negative.    Respiratory: Negative.    Cardiovascular: Negative.    Gastrointestinal: Negative.    Genitourinary: Negative.    Musculoskeletal: Negative.    Skin: Negative.    Neurological: Negative.    Psychiatric/Behavioral: Negative.        Past Medical History:   Diagnosis Date   • Blood clot associated with vein wall inflammation    • Cirrhosis of liver (CMS/HCC)    • Enlarged heart    • Histoplasmosis    • Hypertension    • Lupus (CMS/HCC)    • Renal disorder     40% function       Allergies   Allergen Reactions   • Tramadol Other (See Comments)     seizure   • Melon Rash       Past Surgical History:   Procedure Laterality Date   • BEDSIDE PARACENTESIS  8/22/2019        • FOOT SURGERY Left     toe removal   • IR STENT PLACEMENT Left     leg   • LUNG REMOVAL, PARTIAL Right     right lower lobe   • LUNG SURGERY      clot removal       No family history on file.    Social History     Socioeconomic History   • Marital status: Single     Spouse name: Not on file   • Number of children: Not on file   • Years of education: Not on file   • Highest education level: Not on file   Tobacco Use   • Smoking status: Never Smoker   • Smokeless tobacco: Current User     Types: Chew   Substance and Sexual Activity   • Alcohol use: No     Frequency: Never   • Drug use: Yes     Types: Marijuana     Comment: occ.    • Sexual activity: Defer       Prior to Admission medications    Medication Sig Start Date End Date Taking? Authorizing Provider   amLODIPine (NORVASC) 10 MG tablet Take 10 mg by mouth  Daily.    Anila Singh MD   baclofen (LIORESAL) 20 MG tablet Take 0.5 tablets by mouth 3 (Three) Times a Day As Needed for Muscle Spasms. 8/30/19   Curry Alejandro MD   buprenorphine (SUBUTEX) 8 MG sublingual tablet SL tablet Place 20 mg under the tongue Daily. Unknown if pt took dose 08/21/19 & quantity    Anila Singh MD   calcium carbonate (OS-BLU) 600 MG tablet Take 600 mg by mouth 2 (Two) Times a Day.    Anila Singh MD   carvedilol (COREG) 25 MG tablet Take 25 mg by mouth 2 (Two) Times a Day With Meals.    Anila Singh MD   clonazePAM (KlonoPIN) 0.5 MG tablet Take 0.5 mg by mouth 2 (Two) Times a Day.    Anila Singh MD   Cyanocobalamin (B-12) 500 MCG sublingual tablet Place 1 tablet under the tongue 2 (Two) Times a Day.    Anila Singh MD   famotidine (PEPCID) 20 MG tablet Take 20 mg by mouth 2 (Two) Times a Day As Needed for Heartburn.    Anila Singh MD   folic acid (FOLVITE) 1 MG tablet Take 1 mg by mouth Daily.    Anila Singh MD   fondaparinux (ARIXTRA) 7.5 MG/0.6ML solution injection Inject 7.5 mg under the skin into the appropriate area as directed Daily.    Anila Singh MD   gabapentin (NEURONTIN) 800 MG tablet Take 800 mg by mouth 4 (Four) Times a Day.    Anila Singh MD   lacosamide (VIMPAT) 100 MG tablet tablet Take 100 mg by mouth 2 (Two) Times a Day.    Anila Singh MD   levETIRAcetam (KEPPRA) 500 MG tablet Take 500 mg by mouth 2 (Two) Times a Day.    Anila Singh MD   QUEtiapine (SEROquel) 50 MG tablet Take 50 mg by mouth Every Night.    Anila Singh MD   rOPINIRole (REQUIP) 0.5 MG tablet Take 0.5 mg by mouth Daily As Needed.    Anila Singh MD   sertraline (ZOLOFT) 50 MG tablet Take 50 mg by mouth Daily.    Anila Singh MD           Objective   Physical Exam  BP 92/56 (BP Location: Left arm, Patient Position: Sitting)   Pulse 60   Temp 97.6 °F  "(36.4 °C) (Oral)   Resp 26   Ht 185.4 cm (73\")   Wt 84.5 kg (186 lb 4.6 oz)   SpO2 100%   BMI 24.58 kg/m²   General: Chronically ill-appearing  male, no acute distress, alert and appropriate  Eyes: Pupils round and reactive, sclera nonicteric  HEENT: Mucous membranes somewhat dry, no mucosal swelling  Neck: Supple, no nuchal rigidity, no lymphadenopathy  Respirations: Respirations nonlabored, equal breath sounds bilaterally, clear lungs  Heart regular rate and rhythm, no murmurs rubs or gallops,   Abdomen soft nontender, mildly distended, no hepatosplenomegaly, no hernia, no mass, normal bowel sounds, no CVA tenderness  Extremities no clubbing cyanosis or edema, calves are symmetric and nontender  Neuro cranial nerves grossly intact, no focal limb deficits  Psych oriented, pleasant affect  Skin no rash, brisk cap refill  Procedures           ED Course      Results for orders placed or performed during the hospital encounter of 12/06/19   Basic Metabolic Panel   Result Value Ref Range    Glucose 99 65 - 99 mg/dL    BUN 96 (H) 6 - 20 mg/dL    Creatinine 3.92 (H) 0.76 - 1.27 mg/dL    Sodium 134 (L) 136 - 145 mmol/L    Potassium 5.0 3.5 - 5.2 mmol/L    Chloride 94 (L) 98 - 107 mmol/L    CO2 26.0 22.0 - 29.0 mmol/L    Calcium 8.4 (L) 8.6 - 10.5 mg/dL    eGFR Non African Amer 17 (L) >60 mL/min/1.73    BUN/Creatinine Ratio 24.5 7.0 - 25.0    Anion Gap 14.0 5.0 - 15.0 mmol/L   CBC Auto Differential   Result Value Ref Range    WBC 6.60 3.40 - 10.80 10*3/mm3    RBC 2.93 (L) 4.14 - 5.80 10*6/mm3    Hemoglobin 8.6 (L) 13.0 - 17.7 g/dL    Hematocrit 26.6 (L) 37.5 - 51.0 %    MCV 90.7 79.0 - 97.0 fL    MCH 29.3 26.6 - 33.0 pg    MCHC 32.3 31.5 - 35.7 g/dL    RDW 15.4 12.3 - 15.4 %    RDW-SD 49.9 37.0 - 54.0 fl    MPV 8.0 6.0 - 12.0 fL    Platelets 118 (L) 140 - 450 10*3/mm3    Neutrophil % 66.0 42.7 - 76.0 %    Lymphocyte % 15.8 (L) 19.6 - 45.3 %    Monocyte % 7.5 5.0 - 12.0 %    Eosinophil % 10.4 (H) 0.3 - 6.2 %    " Basophil % 0.3 0.0 - 1.5 %    Neutrophils, Absolute 4.30 1.70 - 7.00 10*3/mm3    Lymphocytes, Absolute 1.00 0.70 - 3.10 10*3/mm3    Monocytes, Absolute 0.50 0.10 - 0.90 10*3/mm3    Eosinophils, Absolute 0.70 (H) 0.00 - 0.40 10*3/mm3    Basophils, Absolute 0.00 0.00 - 0.20 10*3/mm3    nRBC 0.1 0.0 - 0.2 /100 WBC   Hepatic Function Panel   Result Value Ref Range    Total Protein 6.7 6.0 - 8.5 g/dL    Albumin 3.20 (L) 3.50 - 5.20 g/dL    ALT (SGPT) 7 1 - 41 U/L    AST (SGOT) 7 1 - 40 U/L    Alkaline Phosphatase 95 39 - 117 U/L    Total Bilirubin <0.2 (L) 0.2 - 1.2 mg/dL    Bilirubin, Direct <0.2 (L) 0.2 - 0.3 mg/dL    Bilirubin, Indirect                   MDM  Patient sent from cancer care for admission for acute on chronic renal failure.  He is in no acute distress.  His hyperkalemia has actually resolved.  He does appear somewhat dehydrated was started on IV fluids.  Hospitalist service was paged  Final diagnoses:   Acute renal failure superimposed on chronic kidney disease, unspecified CKD stage, unspecified acute renal failure type (CMS/HCC)   Anemia, unspecified type              Octavio Baumann MD  12/06/19 1910

## 2019-12-06 NOTE — ED NOTES
Pt sent by Dr. Mak d/t elevated potassium levels. Pt note to have distended abdomen worsening over the past 3 days. Mother reports this is due to his kidney and liver function.      Capri Gamez RN  12/06/19 [id number]

## 2019-12-06 NOTE — ASSESSMENT & PLAN NOTE
Wearing O2, ryann desat at night  Etiology - due to lobectomy of lung or other  nonsmoker   More hypoxic today with mild hypercarbia- placed on bipap   cxr - almost unchanged   BNP elevated  resumed bumex for possible volume overload/home regimen

## 2019-12-06 NOTE — H&P
[facility]   HISTORY AND PHYSICAL    Patient Name: Lorenzo Crockett  : 1980  MRN: [id number]  Primary Care Physician: Lori Hale NP  Date of admission: 2019      Subjective    No change in health  Subjective     Chief Complaint:  Worsening renal function    HPI:  Lorenzo Crockett is a 39 y.o. 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 Baseline 1.6-1.7.   He denies any change in his health but reports worsening ascites lately. Denies weight gain. Unable to eat much due to easy satiety due to ascites. But still eating and drinking home. Denies abdominal pain or fever  No JVD. Trace leg edema.     Labs reviewed. Chronic anemia steady. No sign of volume overload but will do CXR. No acidosis. Normal LFTs  Vs- low BP 90-100s on multiple BP regimen, no fever. No tachycardia    Pt was admitted for further management.       Review of Systems   Constitutional: Positive for activity change, appetite change and fatigue.   HENT: Negative.    Eyes: Negative.    Respiratory: Positive for shortness of breath.    Cardiovascular: Negative.    Gastrointestinal: Positive for abdominal distention.   Endocrine: Negative.    Genitourinary: Negative.    Musculoskeletal: Positive for arthralgias, gait problem and myalgias.   Skin: Negative.    Allergic/Immunologic: Negative.    Neurological: Positive for weakness.   Hematological: Negative.    Psychiatric/Behavioral: Positive for dysphoric mood.          Personal History     Past Medical History:   Diagnosis Date   • Blood clot associated with vein wall inflammation    • Cirrhosis of liver (CMS/HCC)    • Enlarged heart    • Histoplasmosis    • Hypertension    • Lupus (CMS/HCC)    • Renal disorder      40% function       Past Surgical History:   Procedure Laterality Date   • BEDSIDE PARACENTESIS  8/22/2019        • FOOT SURGERY Left     toe removal   • IR STENT PLACEMENT Left     leg   • LUNG REMOVAL, PARTIAL Right     right lower lobe   • LUNG SURGERY      clot removal       Family History: family history is not on file. Otherwise pertinent FHx was reviewed and unremarkable.     Social History:  reports that he has never smoked. His smokeless tobacco use includes chew. He reports that he uses drugs. Drug: Marijuana. He reports that he does not drink alcohol.      Medications:    (Not in a hospital admission)        Allergies   Allergen Reactions   • Tramadol Other (See Comments)     seizure   • Melon Rash       Objective   Objective     Vital Signs:   Temp:  [97.3 °F (36.3 °C)-97.6 °F (36.4 °C)] 97.3 °F (36.3 °C)  Heart Rate:  [59-62] 62  Resp:  [16-26] 16  BP: ()/(56-72) 107/67        Physical Exam   Constitutional: He is oriented to person, place, and time. He appears well-developed. No distress.   HENT:   Head: Normocephalic and atraumatic.   Nose: Nose normal.   Mouth/Throat: No oropharyngeal exudate.   Eyes: Conjunctivae and EOM are normal. Pupils are equal, round, and reactive to light.   Neck: Normal range of motion. Neck supple.   Cardiovascular: Normal rate and regular rhythm.   Murmur heard.  Pulmonary/Chest: Effort normal and breath sounds normal. No respiratory distress. He has no wheezes. He has no rales. He exhibits no tenderness.   Abdominal: Soft. Bowel sounds are normal. He exhibits distension. There is no tenderness. There is no rebound.   Musculoskeletal: Normal range of motion.   Neurological: He is alert and oriented to person, place, and time. No cranial nerve deficit.   L foot MTA   Skin: Skin is warm and dry.   Very poor pedal pulse in both feet   Psychiatric: He has a normal mood and affect. His behavior is normal. Judgment and thought content normal.          Results Reviewed:  I  have personally reviewed most recent cardiac tracings, lab results and radiology images and interpretations and agree with findings, most notably:  .      Results from last 7 days   Lab Units 19  1540   WBC 10*3/mm3 6.60   HEMOGLOBIN g/dL 8.6*   HEMATOCRIT % 26.6*   PLATELETS 10*3/mm3 118*     Results from last 7 days   Lab Units 19  1540   SODIUM mmol/L 134*   POTASSIUM mmol/L 5.0   CHLORIDE mmol/L 94*   CO2 mmol/L 26.0   BUN mg/dL 96*   CREATININE mg/dL 3.92*   GLUCOSE mg/dL 99   CALCIUM mg/dL 8.4*   ALT (SGPT) U/L 7   AST (SGOT) U/L 7     Estimated Creatinine Clearance: 30.7 mL/min (A) (by C-G formula based on SCr of 3.92 mg/dL (H)).  Brief Urine Lab Results  (Last result in the past 365 days)      Color   Clarity   Blood   Leuk Est   Nitrite   Protein   CREAT   Urine HCG        19 1406 Yellow Cloudy  Comment:  Result checked  Trace Negative Negative >=300 mg/dL (3+)             Imaging Results (Last 24 Hours)     ** No results found for the last 24 hours. **        Results for orders placed during the hospital encounter of 19   Adult Transthoracic Echo Complete W/ Cont if Necessary Per Protocol    Narrative                           Adult Echocardiogram Report          [facility]  Cardiology Department  [address] [place], [place] [id number]      Name: ZOHRA HERNANDEZ   Study Date: 2019 05:56 AM  BP: 110/59 mmHg  MRN: [id number]                Patient Location:   : 1980               Gender: Male                     Height: 71 in  Age: 38 yrs                   Account#: [id number]            Weight: 205 lb  Reason For Study: Anemia/Hypotension                           BSA: 2.1 m2  Ordering Physician: HOSSEIN BURNETT  Referring Physician: LESLEE CARCAMO  Performed By: TR      M-Mode/2-D Measurements:  LVIDd: 4.7 cm       (3.7-5.7) LVPWd: 1.1 cm        (0.8-1.2)  LVIDs: 3.4 cm       (2.3-3.9)  ACS: 2.1 cm         (1.6-3.7) IVSd: 0.97 cm         (0.7-1.2)  LA dimension: 4.2 cm(1.9-4.0) RVDd: 2.7 cm         (0.7-2.4)  FS: 28.5 %          (21-40%)  Ao root diam: 3.1 cm (2.0-3.7)    Procedure  A 2D, M-mode, color flow and doppler echocardiogram was performed.    Comments  The left ventricle is normal in size. Left ventricular systolic function is  normal. Ejection Fraction is 55-60%. The transmitral spectral Doppler flow  pattern is suggestive of impaired LV relaxation. No regional wall motion  abnormalities noted. The right ventricle is moderately dilated. The right  ventricular systolic function is mild to moderately reduced. The left atrium  is mildly dilated. The right atrium is moderately dilated. The mitral valve is  grossly normal in structure. There is trace mitral regurgitation. The  tricuspid valve is not well visualized, but is grossly normal. There is mild  tricuspid regurgitation. Right ventricular systolic pressure is estimated at  78 mm Hg. The aortic valve is trileaflet. The aortic valve opens well. Trace  aortic regurgitation. The pulmonic valve is not well visualized. Trace  pulmonic valvular regurgitation. The aortic root is normal size. The pulmonary  is not well visualized. There is no pericardial effusion.      Interpretation  Left ventricular systolic function is normal.  Ejection Fraction is 55-60%  The transmitral spectral Doppler flow pattern is suggestive of impaired LV  relaxation.  The right ventricle is moderately dilated.  The right ventricular systolic function is mild to moderately reduced.  The left atrium is mildly dilated.  The right atrium is moderately dilated.  There is trace mitral regurgitation.  Trace aortic regurgitation.  There is mild tricuspid regurgitation.  Right ventricular systolic pressure is estimated at 78 mm Hg.      MMode/2D Measurements & Calculations  ESV(Teich): 46.1 ml                  Ao root area: 7.5 cm2  EF(Teich): 54.9 %  LVOT diam: 1.7 cm                    EDV(MOD-sp4): 81.9 ml                                        ESV(MOD-sp4): 37.8 ml                                       EF(MOD-sp4): 53.9 %    Doppler Measurements & Calculations  MV E max mono: 116.7 cm/sec               MV max P.3 mmHg  MV A max mono: 89.8 cm/sec                MV mean PG: 3.0 mmHg  MV E/A: 1.3  MV dec slope: 629.7 cm/sec2              Ao V2 max: 144.0 cm/sec  MV dec time: 0.19 sec                    Ao max P.3 mmHg                                           Ao V2 mean: 108.4 cm/sec                                           Ao mean P.1 mmHg                                           Ao V2 VTI: 26.8 cm                                           МАРИНА(I,D): 2.1 cm2                                             МАРИНА(V,D): 1.9 cm2  LV V1 max P.3 mmHg                   PA max P.2 mmHg  LV V1 mean P.9 mmHg  LV V1 max: 115.2 cm/sec  LV V1 mean: 79.4 cm/sec  LV V1 VTI: 23.9 cm  TR max mono: 384.2 cm/sec  TR max P.1 mmHg  RVSP(TR): 74.1 mmHg    _______________________________________________________________________________      Electronically signed by: MD Eliel Torrez  on 2019 03:00 PM         Assessment/Plan   Assessment / Plan     Brief Patient Summary:  Lorenzo Crockett is a 39 y.o. male who  Has CAPS with recurrent PE and DVT on arixtra, LLE bypass, liver cirrhosis with ascites- presenting with worsening Cr. Remains non oliguric    Active Hospital Problems:  * Acute renal failure superimposed on chronic kidney disease (CMS/HCC)- (present on admission)    Etiology; prerenal vs renal vs obstruction  Non oliguric per pt  Urine lyte to rule out dehydration  Hypotensive- on multiple regimen home, hold amlodipine, clonidine but continue on Coreg at low dose  Renal US to rule out obstruction  Given ascites with worsening, rule out hepatorenal syndrome  Will given another saline 500ml- total 1 liter, apply albumin 25g x2 q8hrs for now  Nephrology consult am   Hold bumex       Chronic pain disorder- (present on  admission)    On subotex due to addiction issue initially but due to pain control also since he had 2 major lung surgery  Followed by pain clinic  Wanting to change pain med since pain is not contolled  Consult to pain clinic   Watch for constipation for opiate related     Amputation of foot (CMS/HCC)    Due to arterial clot in LLE      Benign essential hypertension- (present on admission)    On clonidine, coreg and amlodipine and bumex  Since BP low 90-100s - hold except low dose coreg      Anemia, chronic disease- (present on admission)    Due to mild JUNG and ACD  Followed by Dr. Mak for CAPS      Chronic respiratory failure with hypoxia (CMS/HCC)- (present on admission)    Wearing O2, ryann desat at night  Etiology - due to lobectomy of lung or other  nonsmoker      Other ascites- (present on admission)    S/p 2 paracentesis - last one 8/2019  Getting worse gradually recently      SLE (systemic lupus erythematosus) (CMS/HCC)- (present on admission)    Not on specific med      PVD (peripheral vascular disease) (CMS/HCC)- (present on admission)    Got clot on LLE at the age 14, s/p bypass surgery      Hepatic cirrhosis (CMS/HCC)- (present on admission)    Etiology- unclear, possibly related to lupus per pt      Deep vein thrombosis (DVT) of lower extremity (CMS/HCC)- (present on admission)    On Arixtra      CAPS (catastrophic antiphospholipid syndrome) (CMS/HCC)- (present on admission)    Multiple recurrent PE and DVT -s/p lobectomy, both lung due to clots    S/p plasmapheresis, iv IG, high dose steroid, this year   On arixtra- initially coumadin and lovenox  Dr. Mak - hematologist  Not following with rheumatology                 DVT prophylaxis:  arixtra    CODE STATUS:    Code Status and Medical Interventions:   Ordered at: 12/06/19 0067     Level Of Support Discussed With:    Patient     Code Status:    CPR     Medical Interventions (Level of Support Prior to Arrest):    Full       Admission Status:  I  believe this patient meets inpatient  criteria.    Electronically signed by Pretty Sevilla MD, 12/06/19, 6:42 PM.

## 2019-12-06 NOTE — ASSESSMENT & PLAN NOTE
S/p 2 paracentesis - last one 8/2019  Getting worse gradually recently    worse in ascites since admission, possibly due to fluid - paracentesis by IR- 6 liter off today, albumin 25g given

## 2019-12-07 LAB
ANION GAP SERPL CALCULATED.3IONS-SCNC: 13 MMOL/L (ref 5–15)
BASOPHILS # BLD AUTO: 0.1 10*3/MM3 (ref 0–0.2)
BASOPHILS NFR BLD AUTO: 1.3 % (ref 0–1.5)
BUN BLD-MCNC: 100 MG/DL (ref 6–20)
BUN/CREAT SERPL: 25.4 (ref 7–25)
CALCIUM SPEC-SCNC: 8.3 MG/DL (ref 8.6–10.5)
CHLORIDE SERPL-SCNC: 98 MMOL/L (ref 98–107)
CK SERPL-CCNC: 12 U/L (ref 20–200)
CO2 SERPL-SCNC: 22 MMOL/L (ref 22–29)
CREAT BLD-MCNC: 3.93 MG/DL (ref 0.76–1.27)
DEPRECATED RDW RBC AUTO: 53.4 FL (ref 37–54)
EOSINOPHIL # BLD AUTO: 0.5 10*3/MM3 (ref 0–0.4)
EOSINOPHIL NFR BLD AUTO: 10.7 % (ref 0.3–6.2)
EOSINOPHIL SPEC QL MICRO: 0 % EOS/100 CELLS (ref 0–0)
ERYTHROCYTE [DISTWIDTH] IN BLOOD BY AUTOMATED COUNT: 16 % (ref 12.3–15.4)
GFR SERPL CREATININE-BSD FRML MDRD: 17 ML/MIN/1.73
GLUCOSE BLD-MCNC: 99 MG/DL (ref 65–99)
HCT VFR BLD AUTO: 24.9 % (ref 37.5–51)
HGB BLD-MCNC: 8 G/DL (ref 13–17.7)
LYMPHOCYTES # BLD AUTO: 1 10*3/MM3 (ref 0.7–3.1)
LYMPHOCYTES NFR BLD AUTO: 18.9 % (ref 19.6–45.3)
MCH RBC QN AUTO: 29.5 PG (ref 26.6–33)
MCHC RBC AUTO-ENTMCNC: 32.1 G/DL (ref 31.5–35.7)
MCV RBC AUTO: 92 FL (ref 79–97)
MONOCYTES # BLD AUTO: 0.4 10*3/MM3 (ref 0.1–0.9)
MONOCYTES NFR BLD AUTO: 8.8 % (ref 5–12)
NEUTROPHILS # BLD AUTO: 3.1 10*3/MM3 (ref 1.7–7)
NEUTROPHILS NFR BLD AUTO: 60.3 % (ref 42.7–76)
NRBC BLD AUTO-RTO: 0.1 /100 WBC (ref 0–0.2)
PLATELET # BLD AUTO: 107 10*3/MM3 (ref 140–450)
PMV BLD AUTO: 8 FL (ref 6–12)
POTASSIUM BLD-SCNC: 5 MMOL/L (ref 3.5–5.2)
PROT UR-MCNC: 205.6 MG/DL
PTH-INTACT SERPL-MCNC: 132.6 PG/ML (ref 15–65)
RBC # BLD AUTO: 2.71 10*6/MM3 (ref 4.14–5.8)
SODIUM BLD-SCNC: 133 MMOL/L (ref 136–145)
TSH SERPL DL<=0.05 MIU/L-ACNC: 2.78 UIU/ML (ref 0.27–4.2)
URATE SERPL-MCNC: 9.3 MG/DL (ref 3.4–7)
WBC NRBC COR # BLD: 5.1 10*3/MM3 (ref 3.4–10.8)

## 2019-12-07 PROCEDURE — 85025 COMPLETE CBC W/AUTO DIFF WBC: CPT | Performed by: INTERNAL MEDICINE

## 2019-12-07 PROCEDURE — 80048 BASIC METABOLIC PNL TOTAL CA: CPT | Performed by: INTERNAL MEDICINE

## 2019-12-07 PROCEDURE — 25010000002 METHYLPREDNISOLONE PER 125 MG: Performed by: INTERNAL MEDICINE

## 2019-12-07 PROCEDURE — 99233 SBSQ HOSP IP/OBS HIGH 50: CPT | Performed by: INTERNAL MEDICINE

## 2019-12-07 PROCEDURE — 25010000002 ALBUMIN HUMAN 25% PER 50 ML: Performed by: INTERNAL MEDICINE

## 2019-12-07 PROCEDURE — 82570 ASSAY OF URINE CREATININE: CPT | Performed by: INTERNAL MEDICINE

## 2019-12-07 PROCEDURE — 84156 ASSAY OF PROTEIN URINE: CPT | Performed by: INTERNAL MEDICINE

## 2019-12-07 PROCEDURE — 83970 ASSAY OF PARATHORMONE: CPT | Performed by: INTERNAL MEDICINE

## 2019-12-07 PROCEDURE — P9047 ALBUMIN (HUMAN), 25%, 50ML: HCPCS | Performed by: INTERNAL MEDICINE

## 2019-12-07 PROCEDURE — 25010000002 IRON SUCROSE PER 1 MG: Performed by: INTERNAL MEDICINE

## 2019-12-07 PROCEDURE — 86160 COMPLEMENT ANTIGEN: CPT | Performed by: INTERNAL MEDICINE

## 2019-12-07 PROCEDURE — 87205 SMEAR GRAM STAIN: CPT | Performed by: INTERNAL MEDICINE

## 2019-12-07 PROCEDURE — 25010000002 CALCIUM GLUCONATE-NACL 1-0.675 GM/50ML-% SOLUTION: Performed by: INTERNAL MEDICINE

## 2019-12-07 PROCEDURE — 25010000002 FONDAPARINUX PER 0.5 MG: Performed by: INTERNAL MEDICINE

## 2019-12-07 PROCEDURE — 82550 ASSAY OF CK (CPK): CPT | Performed by: INTERNAL MEDICINE

## 2019-12-07 PROCEDURE — 84443 ASSAY THYROID STIM HORMONE: CPT | Performed by: INTERNAL MEDICINE

## 2019-12-07 PROCEDURE — 84550 ASSAY OF BLOOD/URIC ACID: CPT | Performed by: INTERNAL MEDICINE

## 2019-12-07 RX ORDER — MIDODRINE HYDROCHLORIDE 5 MG/1
5 TABLET ORAL
Status: DISCONTINUED | OUTPATIENT
Start: 2019-12-07 | End: 2019-12-09

## 2019-12-07 RX ORDER — CALCIUM GLUCONATE 20 MG/ML
1 INJECTION, SOLUTION INTRAVENOUS ONCE
Status: DISCONTINUED | OUTPATIENT
Start: 2019-12-07 | End: 2019-12-12

## 2019-12-07 RX ORDER — LACOSAMIDE 100 MG/1
100 TABLET ORAL ONCE
Status: COMPLETED | OUTPATIENT
Start: 2019-12-07 | End: 2019-12-07

## 2019-12-07 RX ORDER — CARVEDILOL 3.12 MG/1
3.12 TABLET ORAL 2 TIMES DAILY WITH MEALS
Status: DISCONTINUED | OUTPATIENT
Start: 2019-12-07 | End: 2019-12-09

## 2019-12-07 RX ORDER — ALBUMIN (HUMAN) 12.5 G/50ML
25 SOLUTION INTRAVENOUS EVERY 8 HOURS
Status: COMPLETED | OUTPATIENT
Start: 2019-12-07 | End: 2019-12-08

## 2019-12-07 RX ORDER — SODIUM CHLORIDE 9 MG/ML
75 INJECTION, SOLUTION INTRAVENOUS CONTINUOUS
Status: DISCONTINUED | OUTPATIENT
Start: 2019-12-07 | End: 2019-12-10

## 2019-12-07 RX ADMIN — GABAPENTIN 300 MG: 300 CAPSULE ORAL at 22:11

## 2019-12-07 RX ADMIN — LEVETIRACETAM 500 MG: 500 TABLET ORAL at 10:26

## 2019-12-07 RX ADMIN — MIDODRINE HYDROCHLORIDE 5 MG: 5 TABLET ORAL at 17:17

## 2019-12-07 RX ADMIN — FONDAPARINUX SODIUM 7.5 MG: 7.5 INJECTION, SOLUTION SUBCUTANEOUS at 01:15

## 2019-12-07 RX ADMIN — MIDODRINE HYDROCHLORIDE 5 MG: 5 TABLET ORAL at 15:14

## 2019-12-07 RX ADMIN — OYSTER SHELL CALCIUM WITH VITAMIN D 1 TABLET: 500; 200 TABLET, FILM COATED ORAL at 10:29

## 2019-12-07 RX ADMIN — LACOSAMIDE 100 MG: 100 TABLET, FILM COATED ORAL at 00:56

## 2019-12-07 RX ADMIN — LEVETIRACETAM 500 MG: 500 TABLET ORAL at 22:11

## 2019-12-07 RX ADMIN — IRON SUCROSE 200 MG: 20 INJECTION, SOLUTION INTRAVENOUS at 15:14

## 2019-12-07 RX ADMIN — CYANOCOBALAMIN TAB 250 MCG 250 MCG: 250 TAB at 10:26

## 2019-12-07 RX ADMIN — GABAPENTIN 300 MG: 300 CAPSULE ORAL at 15:26

## 2019-12-07 RX ADMIN — SODIUM BICARBONATE 50 MEQ: 84 INJECTION, SOLUTION INTRAVENOUS at 14:50

## 2019-12-07 RX ADMIN — ALBUMIN HUMAN 25 G: 0.25 SOLUTION INTRAVENOUS at 05:00

## 2019-12-07 RX ADMIN — LACOSAMIDE 100 MG: 100 TABLET, FILM COATED ORAL at 22:11

## 2019-12-07 RX ADMIN — Medication 10 ML: at 22:06

## 2019-12-07 RX ADMIN — GABAPENTIN 300 MG: 300 CAPSULE ORAL at 06:00

## 2019-12-07 RX ADMIN — SODIUM CHLORIDE 75 ML/HR: 900 INJECTION, SOLUTION INTRAVENOUS at 15:13

## 2019-12-07 RX ADMIN — SERTRALINE HYDROCHLORIDE 50 MG: 50 TABLET ORAL at 10:25

## 2019-12-07 RX ADMIN — CLONAZEPAM 0.5 MG: 0.5 TABLET ORAL at 22:10

## 2019-12-07 RX ADMIN — ALBUMIN HUMAN 25 G: 0.25 SOLUTION INTRAVENOUS at 17:17

## 2019-12-07 RX ADMIN — BUPRENORPHINE HYDROCHLORIDE 20 MG: 8 TABLET SUBLINGUAL at 10:30

## 2019-12-07 RX ADMIN — FOLIC ACID 1 MG: 1 TABLET ORAL at 10:24

## 2019-12-07 RX ADMIN — FONDAPARINUX SODIUM 7.5 MG: 7.5 INJECTION, SOLUTION SUBCUTANEOUS at 17:17

## 2019-12-07 RX ADMIN — LACOSAMIDE 100 MG: 100 TABLET, FILM COATED ORAL at 10:52

## 2019-12-07 RX ADMIN — CLONAZEPAM 0.5 MG: 0.5 TABLET ORAL at 10:29

## 2019-12-07 RX ADMIN — OYSTER SHELL CALCIUM WITH VITAMIN D 1 TABLET: 500; 200 TABLET, FILM COATED ORAL at 22:11

## 2019-12-07 RX ADMIN — Medication 10 ML: at 10:25

## 2019-12-07 NOTE — CONSULTS
NEPHROLOGY CONSULTATION-----[facility]    [facility]  [phone]  Jorge Luis Goodwin MD    Patient Care Team:  Lori Hale NP as PCP - General (Family Medicine)  Lori Hale NP as PCP - Claims Attributed  Loir Hale NP as PCP - Family Medicine  Oma Mak MD as Consulting Physician (Hematology and Oncology)  Pito Goodwin MD as Consulting Physician (Nephrology)    CC/REASON FOR CONSULTATION: RENAL FAILURE/ELEVATED SERUM CREATININE  PHYSICIAN REQUESTING CONSULTATION:     History of Present Illness    HPI    Patient is a 39 y.o. WM, very well known to me as I actively follow him as an outpatient for management of his CRF/CKD that's secondary to SLE/MERYL,  whom I was asked to see in consultation for evaluation and management of renal failure/elevated serum creatinine. Patient was admitted with    Patient denies prior knowledge of functional kidney disease, proteinuria, or hematuria. No  recent IV dye exposure. No known h/o hepatitis, TB, rheumatic fever, jaundice, SLE, bleeding/bruising disorders. Patient does report occasional NSAID use, OTC.  No urinary sx. No edema or fluid retention.  ?Compliance with home meds?  No herbal med use.    Review of Systems   Constitutional: Positive for activity change and fatigue. Negative for appetite change, chills, diaphoresis, fever and unexpected weight change.   HENT: Negative for congestion, dental problem, drooling, ear discharge, ear pain, facial swelling, hearing loss, mouth sores, nosebleeds, postnasal drip, rhinorrhea, sinus pressure, sinus pain, sneezing, sore throat, tinnitus, trouble swallowing and voice change.    Eyes: Negative for photophobia, pain, discharge, redness, itching and visual disturbance.   Respiratory: Negative for apnea, cough, choking, chest tightness, shortness of breath, wheezing and stridor.    Cardiovascular: Negative for chest pain, palpitations and leg  swelling.   Gastrointestinal: Negative for abdominal distention, abdominal pain, anal bleeding, blood in stool, constipation, diarrhea, nausea, rectal pain and vomiting.   Endocrine: Negative for cold intolerance, heat intolerance, polydipsia, polyphagia and polyuria.   Genitourinary: Positive for frequency. Negative for decreased urine volume, difficulty urinating, dysuria, enuresis, flank pain, genital sores, hematuria and urgency.   Musculoskeletal: Positive for arthralgias, back pain and myalgias. Negative for gait problem, joint swelling, neck pain and neck stiffness.   Skin: Positive for wound. Negative for color change, pallor and rash.   Allergic/Immunologic: Negative for environmental allergies, food allergies and immunocompromised state.   Neurological: Positive for weakness. Negative for dizziness, tremors, seizures, syncope, facial asymmetry, speech difficulty, light-headedness, numbness and headaches.   Hematological: Negative for adenopathy. Does not bruise/bleed easily.   Psychiatric/Behavioral: Negative for agitation, behavioral problems, confusion, decreased concentration, dysphoric mood, hallucinations, self-injury, sleep disturbance and suicidal ideas. The patient is not nervous/anxious and is not hyperactive.           Past Medical History:   Diagnosis Date   • Blood clot associated with vein wall inflammation    • Cirrhosis of liver (CMS/HCC)    • Enlarged heart    • Histoplasmosis    • Hypertension    • Lupus (CMS/HCC)    • Renal disorder     40% function       Past Surgical History:   Procedure Laterality Date   • BEDSIDE PARACENTESIS  8/22/2019        • FOOT SURGERY Left     toe removal   • IR STENT PLACEMENT Left     leg   • LUNG REMOVAL, PARTIAL Right     right lower lobe   • LUNG SURGERY      clot removal       No family history on file.    Social History     Tobacco Use   • Smoking status: Never Smoker   • Smokeless tobacco: Current User     Types: Chew   Substance Use Topics   • Alcohol  use: No     Frequency: Never   • Drug use: Yes     Types: Marijuana     Comment: occ.        Home Meds:   (Not in a hospital admission)    Scheduled Meds:    buprenorphine 20 mg Sublingual Daily   calcium-vitamin D 1 tablet Oral BID   carvedilol 6.25 mg Oral BID With Meals   clonazePAM 0.5 mg Oral BID   ferrous sulfate 324 mg Oral Daily   folic acid 1 mg Oral Daily   fondaparinux 7.5 mg Subcutaneous Daily   gabapentin 300 mg Oral Q8H   lacosamide 100 mg Oral BID   levETIRAcetam 500 mg Oral Q12H   sertraline 50 mg Oral Daily   sodium chloride 10 mL Intravenous Q12H   vitamin B-12 250 mcg Oral Daily       Continuous Infusions:       PRN Meds:  •  baclofen  •  bisacodyl  •  bisacodyl  •  calcium carbonate  •  famotidine  •  melatonin  •  nitroglycerin  •  ondansetron **OR** ondansetron  •  oxyCODONE  •  prochlorperazine  •  QUEtiapine  •  rOPINIRole  •  [COMPLETED] Insert peripheral IV **AND** sodium chloride  •  sodium chloride    Allergies:  Tramadol and Melon    OBJECTIVE    Vital Signs  Temp:  [97.3 °F (36.3 °C)-98.3 °F (36.8 °C)] 97.5 °F (36.4 °C)  Heart Rate:  [59-62] 59  Resp:  [16-26] 20  BP: ()/(56-72) 106/66    No intake/output data recorded.  I/O last 3 completed shifts:  In: 500 [IV Piggyback:500]  Out: -     Physical Exam:  General Appearance: alert, appears stated age and cooperative  Head: normocephalic, without obvious abnormality and atraumatic +DRY OP  Eyes: conjunctivae and sclerae normal and no icterus  Neck: supple and no JVD  Lungs: clear to auscultation and respirations regular  Heart: regular rhythm & normal rate and normal S1, S2 +DEDE  Chest Wall: no abnormalities observed  Abdomen: normal bowel sounds and soft non-tender +MILD ASCITES  Extremities: moves extremities well, no edema, no cyanosis and no redness  Skin: no bleeding, bruising or rash +LEFT PRETIBIAL WOUND  Neurologic: Alert, and oriented. No focal deficits    Results Review:    I reviewed the patient's new clinical  results.    WBC WBC   Date Value Ref Range Status   12/07/2019 5.10 3.40 - 10.80 10*3/mm3 Final   12/06/2019 6.60 3.40 - 10.80 10*3/mm3 Final   12/04/2019 6.49 3.40 - 10.80 10*3/mm3 Final      HGB Hemoglobin   Date Value Ref Range Status   12/07/2019 8.0 (L) 13.0 - 17.7 g/dL Final   12/06/2019 8.6 (L) 13.0 - 17.7 g/dL Final   12/04/2019 8.4 (L) 13.0 - 17.7 g/dL Final      HCT Hematocrit   Date Value Ref Range Status   12/07/2019 24.9 (L) 37.5 - 51.0 % Final   12/06/2019 26.6 (L) 37.5 - 51.0 % Final   12/04/2019 26.9 (L) 37.5 - 51.0 % Final      Platlets No results found for: LABPLAT   MCV MCV   Date Value Ref Range Status   12/07/2019 92.0 79.0 - 97.0 fL Final   12/06/2019 90.7 79.0 - 97.0 fL Final   12/04/2019 93.1 79.0 - 97.0 fL Final          Sodium Sodium   Date Value Ref Range Status   12/07/2019 133 (L) 136 - 145 mmol/L Final   12/06/2019 134 (L) 136 - 145 mmol/L Final   12/04/2019 132 (L) 136 - 145 mmol/L Final      Potassium Potassium   Date Value Ref Range Status   12/07/2019 5.0 3.5 - 5.2 mmol/L Final   12/06/2019 5.0 3.5 - 5.2 mmol/L Final   12/04/2019 5.5 (H) 3.5 - 5.2 mmol/L Final      Chloride Chloride   Date Value Ref Range Status   12/07/2019 98 98 - 107 mmol/L Final   12/06/2019 94 (L) 98 - 107 mmol/L Final   12/04/2019 94 (L) 98 - 107 mmol/L Final      CO2 CO2   Date Value Ref Range Status   12/07/2019 22.0 22.0 - 29.0 mmol/L Final   12/06/2019 26.0 22.0 - 29.0 mmol/L Final   12/04/2019 26.0 22.0 - 29.0 mmol/L Final      BUN BUN   Date Value Ref Range Status   12/07/2019 100 (H) 6 - 20 mg/dL Final   12/06/2019 96 (H) 6 - 20 mg/dL Final   12/04/2019 93 (H) 6 - 20 mg/dL Final      Creatinine Creatinine   Date Value Ref Range Status   12/07/2019 3.93 (H) 0.76 - 1.27 mg/dL Final   12/06/2019 3.92 (H) 0.76 - 1.27 mg/dL Final   12/04/2019 3.89 (H) 0.76 - 1.27 mg/dL Final      Calcium Calcium   Date Value Ref Range Status   12/07/2019 8.3 (L) 8.6 - 10.5 mg/dL Final   12/06/2019 8.4 (L) 8.6 - 10.5 mg/dL  Final   12/04/2019 8.5 (L) 8.6 - 10.5 mg/dL Final      PO4 No results found for: CAPO4   Albumin Albumin   Date Value Ref Range Status   12/06/2019 3.20 (L) 3.50 - 5.20 g/dL Final   12/04/2019 3.00 (L) 3.50 - 5.20 g/dL Final      Magnesium No results found for: MG   Uric Acid No results found for: URICACID       Imaging Results (Last 72 Hours)     Procedure Component Value Units Date/Time    US Renal Bilateral [[id number]] Collected:  12/06/19 2317     Updated:  12/06/19 2328    Narrative:       DATE OF EXAM:  12/6/2019 8:43 PM     PROCEDURE:  US RENAL BILATERAL-     INDICATIONS:  acute renal failure; N17.9-Acute kidney failure, unspecified;  N18.9-Chronic kidney disease, unspecified; D64.9-Anemia, unspecified     COMPARISON:  Renal ultrasound dated 08/21/2019     TECHNIQUE:   Grayscale and color Doppler ultrasound evaluation of the kidneys and  urinary bladder was performed.        FINDINGS:  Moderate amount of ascites is present. Kidneys are markedly echogenic  with mild cortical thinning. No hydronephrosis. Kidneys measure  approximately 11.3 and 9.3 cm in [id number] length on the right and  left respectively. The Bladder is empty.        Impression:       Markedly echogenic kidneys with mild cortical thinning. No  hydronephrosis.  Moderate amount of ascites.     Electronically Signed By-Yogesh Salmeron DO. On:12/6/2019 11:20 PM  This report was finalized on [id number] by  Yogesh Salmeron DO..    XR Chest 1 View [[id number]] Collected:  12/06/19 2014     Updated:  12/06/19 2020    Narrative:       XR CHEST 1 VW-     Date of Exam: 12/6/2019 6:55 PM     Indication: sob; N17.9-Acute kidney failure, unspecified; N18.9-Chronic  kidney disease, unspecified; D64.9-Anemia, unspecified  39-year-old  history of lupus, on home oxygen, anemia     Comparison: 08/21/2019, 03/29/2019     Technique: 1 view(s) of the chest were obtained.     FINDINGS: There is still volume loss in the right chest which is  likely postsurgical.  There are also sternotomy wires and postsurgical  changes in the mediastinum. Alveolar and interstitial opacities  bilaterally are present similar in appearance and distribution to  8/21/2019 and 3/29/2019. No pneumothorax or definite pleural effusion.  There is calcification along the right diaphragm which is unchanged.  Trachea is midline. No definite pleural effusions.       Impression:       There are postsurgical changes in the right chest as well as sternotomy  wires and surgical clips in the mediastinum. No significant change in  alveolar and airspace opacities bilaterally which may be chronic or due  to recurrent infection.        Electronically Signed By-Yogesh Salmeron DO. On:12/6/2019 8:18 PM  This report was finalized on [id number] by  Yogesh Salmeron DO..            Results for orders placed during the hospital encounter of 12/06/19   XR Chest 1 View    Narrative XR CHEST 1 VW-     Date of Exam: 12/6/2019 6:55 PM     Indication: sob; N17.9-Acute kidney failure, unspecified; N18.9-Chronic  kidney disease, unspecified; D64.9-Anemia, unspecified  39-year-old  history of lupus, on home oxygen, anemia     Comparison: 08/21/2019, 03/29/2019     Technique: 1 view(s) of the chest were obtained.     FINDINGS: There is still volume loss in the right chest which is  likely postsurgical. There are also sternotomy wires and postsurgical  changes in the mediastinum. Alveolar and interstitial opacities  bilaterally are present similar in appearance and distribution to  8/21/2019 and 3/29/2019. No pneumothorax or definite pleural effusion.  There is calcification along the right diaphragm which is unchanged.  Trachea is midline. No definite pleural effusions.       Impression There are postsurgical changes in the right chest as well as sternotomy  wires and surgical clips in the mediastinum. No significant change in  alveolar and airspace opacities bilaterally which may be chronic or due  to recurrent infection.         Electronically Signed By-Yogesh Salmeron DO. On:12/6/2019 8:18 PM  This report was finalized on [id number] by  Yogesh Salmeron DO..              ASSESSMENT / PLAN      Acute renal failure superimposed on chronic kidney disease (CMS/HCC)    CAPS (catastrophic antiphospholipid syndrome) (CMS/HCC)    Benign essential hypertension    Chronic pain disorder    Deep vein thrombosis (DVT) of lower extremity (CMS/HCC)    Hepatic cirrhosis (CMS/HCC)    PVD (peripheral vascular disease) (CMS/HCC)    SLE (systemic lupus erythematosus) (CMS/HCC)    Other ascites    Anemia, chronic disease    Hyperkalemia    1. RENAL FAILURE---- Nonoliguric. +ARF/TABBY on top of known CRF/CKD stage 3, with a baseline serum creatinine of around 1.6-1.7. CRF/CKD STG 3 is secondary to HTN NS/SLE/history of biopsy proven MERYL which may be recurrent. Renal US ok. +ARF/TABBY appears to be secondary to prerenal state and occasional NSAID use and likely recurrent MERYL. Check urine and serum studies. No obstructive uropathy on US. Cautiously hydrate. No NSAIDs. No IV dye unless urgently needed.  Dose medsfor CrCL<10 cc/min. Follow for dialysis need. No obvious uremia at present     2. PERSISTENT PROTEINURIA---Quantify. 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 Will add IV solumedrol empirically. Refuses biopsy and not likely to be beneficial at present.     3. HISTORY OF LUPUS NEPHRITIS--- Check complements. Add IV Solumedrol empirically     4. HISTORY OF MINIMAL CHANGE DISEASE--- quantify proteinuria.  Steroids for likely relapse     5. HTN WITH CKD--- BP low. Avoid hypotension.     6. HYPOALBUMINEMIA--- secondary to proteinuria and poor PO intake. IV Albumin to temporize     7. ANEMIA OF CKD--- Venofer for JUNG. Follow for PRBC need. Add EPO after iron given     8. HYPONATREMIA-- Hypotonic and clinically hypovolemic. Give NS and follow     9. HYPOCALCEMIA--- Replace IV. Follow ionized levels     10. HYPERKALEMIA--- Treated medically. Better     11. CUMMINS----  Follow ascites for paracentesis need     12. MEDICAL NONCOMPLIANCE     13. THROMBOCYTOPENIA-------?recurrent autoimmune thrombocytopenia. Last hospitalization, patient required plasmapheresis. , Hem/Onc to see    I discussed the patients findings and my recommendations with patient, nursing staff and consulting provider    Will follow along closely. Thank you for allowing us to see this patient in renal consultation.    [facility]  [phone]  MD Jorge Luis Judd MD  12/07/19  8:06 AM

## 2019-12-07 NOTE — PLAN OF CARE
Continue monitor labs, IV fluids, wound care.  Problem: Anemia (Adult)  Goal: Identify Related Risk Factors and Signs and Symptoms  Outcome: Ongoing (interventions implemented as appropriate)  Flowsheets (Taken 12/7/2019 0648 by Sveat Carrero RN)  Related Risk Factors (Anemia): chronic illness;poor nutrition

## 2019-12-07 NOTE — NURSING NOTE
Called Christa and dr Contreras said ok for a PICC the picc team was able to get a IV so we will keep a eye on that sight

## 2019-12-08 PROBLEM — N25.81 SECONDARY HYPERPARATHYROIDISM (HCC): Chronic | Status: ACTIVE | Noted: 2019-12-08

## 2019-12-08 LAB
ALBUMIN SERPL-MCNC: 3.8 G/DL (ref 3.5–5.2)
ALBUMIN/GLOB SERPL: 1.4 G/DL
ALP SERPL-CCNC: 88 U/L (ref 39–117)
ALT SERPL W P-5'-P-CCNC: <5 U/L (ref 1–41)
ANION GAP SERPL CALCULATED.3IONS-SCNC: 16 MMOL/L (ref 5–15)
AST SERPL-CCNC: 7 U/L (ref 1–40)
BILIRUB SERPL-MCNC: <0.2 MG/DL (ref 0.2–1.2)
BUN BLD-MCNC: 93 MG/DL (ref 6–20)
BUN/CREAT SERPL: 25.8 (ref 7–25)
C3 SERPL-MCNC: 90 MG/DL (ref 82–167)
C4 SERPL-MCNC: 13 MG/DL (ref 14–44)
CA-I SERPL ISE-MCNC: 1.21 MMOL/L (ref 1.2–1.3)
CALCIUM SPEC-SCNC: 8.3 MG/DL (ref 8.6–10.5)
CHLORIDE SERPL-SCNC: 97 MMOL/L (ref 98–107)
CO2 SERPL-SCNC: 21 MMOL/L (ref 22–29)
CREAT BLD-MCNC: 3.61 MG/DL (ref 0.76–1.27)
DEPRECATED RDW RBC AUTO: 48.1 FL (ref 37–54)
ERYTHROCYTE [DISTWIDTH] IN BLOOD BY AUTOMATED COUNT: 15.5 % (ref 12.3–15.4)
GFR SERPL CREATININE-BSD FRML MDRD: 19 ML/MIN/1.73
GLOBULIN UR ELPH-MCNC: 2.7 GM/DL
GLUCOSE BLD-MCNC: 203 MG/DL (ref 65–99)
GLUCOSE BLDC GLUCOMTR-MCNC: 157 MG/DL (ref 70–105)
GLUCOSE BLDC GLUCOMTR-MCNC: 170 MG/DL (ref 70–105)
GLUCOSE BLDC GLUCOMTR-MCNC: 215 MG/DL (ref 70–105)
HCT VFR BLD AUTO: 25.6 % (ref 37.5–51)
HGB BLD-MCNC: 8.4 G/DL (ref 13–17.7)
MAGNESIUM SERPL-MCNC: 3 MG/DL (ref 1.6–2.6)
MCH RBC QN AUTO: 28.8 PG (ref 26.6–33)
MCHC RBC AUTO-ENTMCNC: 32.9 G/DL (ref 31.5–35.7)
MCV RBC AUTO: 87.4 FL (ref 79–97)
PHOSPHATE SERPL-MCNC: 6.5 MG/DL (ref 2.5–4.5)
PLATELET # BLD AUTO: 106 10*3/MM3 (ref 140–450)
PMV BLD AUTO: 8 FL (ref 6–12)
POTASSIUM BLD-SCNC: 5 MMOL/L (ref 3.5–5.2)
POTASSIUM BLD-SCNC: 5.6 MMOL/L (ref 3.5–5.2)
PROT SERPL-MCNC: 6.5 G/DL (ref 6–8.5)
RBC # BLD AUTO: 2.92 10*6/MM3 (ref 4.14–5.8)
SODIUM BLD-SCNC: 134 MMOL/L (ref 136–145)
WBC NRBC COR # BLD: 3.2 10*3/MM3 (ref 3.4–10.8)

## 2019-12-08 PROCEDURE — 25010000002 FONDAPARINUX PER 0.5 MG: Performed by: INTERNAL MEDICINE

## 2019-12-08 PROCEDURE — 25010000002 EPOETIN ALFA-EPBX 10000 UNIT/ML SOLUTION: Performed by: INTERNAL MEDICINE

## 2019-12-08 PROCEDURE — 84132 ASSAY OF SERUM POTASSIUM: CPT | Performed by: INTERNAL MEDICINE

## 2019-12-08 PROCEDURE — 82330 ASSAY OF CALCIUM: CPT | Performed by: INTERNAL MEDICINE

## 2019-12-08 PROCEDURE — 63710000001 INSULIN LISPRO (HUMAN) PER 5 UNITS: Performed by: INTERNAL MEDICINE

## 2019-12-08 PROCEDURE — P9047 ALBUMIN (HUMAN), 25%, 50ML: HCPCS | Performed by: INTERNAL MEDICINE

## 2019-12-08 PROCEDURE — 99223 1ST HOSP IP/OBS HIGH 75: CPT | Performed by: NURSE PRACTITIONER

## 2019-12-08 PROCEDURE — 99232 SBSQ HOSP IP/OBS MODERATE 35: CPT | Performed by: INTERNAL MEDICINE

## 2019-12-08 PROCEDURE — 25010000002 PIPERACILLIN SOD-TAZOBACTAM PER 1 G: Performed by: INTERNAL MEDICINE

## 2019-12-08 PROCEDURE — 87040 BLOOD CULTURE FOR BACTERIA: CPT | Performed by: INTERNAL MEDICINE

## 2019-12-08 PROCEDURE — 87186 SC STD MICRODIL/AGAR DIL: CPT | Performed by: INTERNAL MEDICINE

## 2019-12-08 PROCEDURE — 87070 CULTURE OTHR SPECIMN AEROBIC: CPT | Performed by: INTERNAL MEDICINE

## 2019-12-08 PROCEDURE — 63710000001 INSULIN REGULAR HUMAN PER 5 UNITS: Performed by: INTERNAL MEDICINE

## 2019-12-08 PROCEDURE — 80053 COMPREHEN METABOLIC PANEL: CPT | Performed by: INTERNAL MEDICINE

## 2019-12-08 PROCEDURE — 84100 ASSAY OF PHOSPHORUS: CPT | Performed by: INTERNAL MEDICINE

## 2019-12-08 PROCEDURE — 25010000002 IRON SUCROSE PER 1 MG: Performed by: INTERNAL MEDICINE

## 2019-12-08 PROCEDURE — 85027 COMPLETE CBC AUTOMATED: CPT | Performed by: INTERNAL MEDICINE

## 2019-12-08 PROCEDURE — 82962 GLUCOSE BLOOD TEST: CPT

## 2019-12-08 PROCEDURE — 83735 ASSAY OF MAGNESIUM: CPT | Performed by: INTERNAL MEDICINE

## 2019-12-08 PROCEDURE — 25010000002 METHYLPREDNISOLONE PER 125 MG: Performed by: INTERNAL MEDICINE

## 2019-12-08 PROCEDURE — 87205 SMEAR GRAM STAIN: CPT | Performed by: INTERNAL MEDICINE

## 2019-12-08 PROCEDURE — 25010000002 ALBUMIN HUMAN 25% PER 50 ML: Performed by: INTERNAL MEDICINE

## 2019-12-08 PROCEDURE — 25010000002 CYANOCOBALAMIN PER 1000 MCG: Performed by: NURSE PRACTITIONER

## 2019-12-08 RX ORDER — SODIUM POLYSTYRENE SULFONATE 15 G/60ML
30 SUSPENSION ORAL; RECTAL ONCE
Status: COMPLETED | OUTPATIENT
Start: 2019-12-08 | End: 2019-12-08

## 2019-12-08 RX ORDER — SODIUM BICARBONATE 650 MG/1
1300 TABLET ORAL 3 TIMES DAILY
Status: DISCONTINUED | OUTPATIENT
Start: 2019-12-08 | End: 2019-12-12

## 2019-12-08 RX ORDER — FEBUXOSTAT 40 MG/1
40 TABLET, FILM COATED ORAL DAILY
Status: DISCONTINUED | OUTPATIENT
Start: 2019-12-08 | End: 2019-12-13 | Stop reason: HOSPADM

## 2019-12-08 RX ORDER — NICOTINE POLACRILEX 4 MG
15 LOZENGE BUCCAL
Status: DISCONTINUED | OUTPATIENT
Start: 2019-12-08 | End: 2019-12-13 | Stop reason: HOSPADM

## 2019-12-08 RX ORDER — CALCIUM ACETATE 667 MG/1
667 CAPSULE ORAL
Status: DISCONTINUED | OUTPATIENT
Start: 2019-12-08 | End: 2019-12-10

## 2019-12-08 RX ORDER — CYANOCOBALAMIN 1000 UG/ML
1000 INJECTION, SOLUTION INTRAMUSCULAR; SUBCUTANEOUS ONCE
Status: COMPLETED | OUTPATIENT
Start: 2019-12-08 | End: 2019-12-08

## 2019-12-08 RX ORDER — DEXTROSE MONOHYDRATE 25 G/50ML
25 INJECTION, SOLUTION INTRAVENOUS
Status: DISCONTINUED | OUTPATIENT
Start: 2019-12-08 | End: 2019-12-13 | Stop reason: HOSPADM

## 2019-12-08 RX ORDER — CALCIUM GLUCONATE 20 MG/ML
1 INJECTION, SOLUTION INTRAVENOUS ONCE
Status: DISCONTINUED | OUTPATIENT
Start: 2019-12-08 | End: 2019-12-08 | Stop reason: SDUPTHER

## 2019-12-08 RX ADMIN — INSULIN LISPRO 3 UNITS: 100 INJECTION, SOLUTION INTRAVENOUS; SUBCUTANEOUS at 11:51

## 2019-12-08 RX ADMIN — FONDAPARINUX SODIUM 7.5 MG: 7.5 INJECTION, SOLUTION SUBCUTANEOUS at 15:49

## 2019-12-08 RX ADMIN — SODIUM POLYSTYRENE SULFONATE 30 G: 15 SUSPENSION ORAL; RECTAL at 09:32

## 2019-12-08 RX ADMIN — SODIUM BICARBONATE 650 MG TABLET 1300 MG: at 15:49

## 2019-12-08 RX ADMIN — EPOETIN ALFA-EPBX 20000 UNITS: 10000 INJECTION, SOLUTION INTRAVENOUS; SUBCUTANEOUS at 15:48

## 2019-12-08 RX ADMIN — OYSTER SHELL CALCIUM WITH VITAMIN D 1 TABLET: 500; 200 TABLET, FILM COATED ORAL at 20:16

## 2019-12-08 RX ADMIN — SODIUM CHLORIDE 75 ML/HR: 900 INJECTION, SOLUTION INTRAVENOUS at 04:46

## 2019-12-08 RX ADMIN — IRON SUCROSE 200 MG: 20 INJECTION, SOLUTION INTRAVENOUS at 15:50

## 2019-12-08 RX ADMIN — Medication 10 ML: at 20:17

## 2019-12-08 RX ADMIN — CALCIUM ACETATE 667 MG: 667 CAPSULE ORAL at 11:47

## 2019-12-08 RX ADMIN — Medication 10 ML: at 08:34

## 2019-12-08 RX ADMIN — FEBUXOSTAT 40 MG: 40 TABLET ORAL at 09:33

## 2019-12-08 RX ADMIN — GABAPENTIN 300 MG: 300 CAPSULE ORAL at 21:26

## 2019-12-08 RX ADMIN — PIPERACILLIN AND TAZOBACTAM 3.38 G: 3; .375 INJECTION, POWDER, LYOPHILIZED, FOR SOLUTION INTRAVENOUS at 10:19

## 2019-12-08 RX ADMIN — MIDODRINE HYDROCHLORIDE 5 MG: 5 TABLET ORAL at 11:47

## 2019-12-08 RX ADMIN — CLONAZEPAM 0.5 MG: 0.5 TABLET ORAL at 20:16

## 2019-12-08 RX ADMIN — SERTRALINE HYDROCHLORIDE 50 MG: 50 TABLET ORAL at 08:31

## 2019-12-08 RX ADMIN — CLONAZEPAM 0.5 MG: 0.5 TABLET ORAL at 08:31

## 2019-12-08 RX ADMIN — MIDODRINE HYDROCHLORIDE 5 MG: 5 TABLET ORAL at 08:31

## 2019-12-08 RX ADMIN — CYANOCOBALAMIN TAB 250 MCG 250 MCG: 250 TAB at 08:32

## 2019-12-08 RX ADMIN — CYANOCOBALAMIN 1000 MCG: 1000 INJECTION, SOLUTION INTRAMUSCULAR; SUBCUTANEOUS at 11:47

## 2019-12-08 RX ADMIN — LACOSAMIDE 100 MG: 100 TABLET, FILM COATED ORAL at 08:31

## 2019-12-08 RX ADMIN — INSULIN LISPRO 2 UNITS: 100 INJECTION, SOLUTION INTRAVENOUS; SUBCUTANEOUS at 20:16

## 2019-12-08 RX ADMIN — ALBUMIN HUMAN 25 G: 0.25 SOLUTION INTRAVENOUS at 08:37

## 2019-12-08 RX ADMIN — LEVETIRACETAM 500 MG: 500 TABLET ORAL at 20:16

## 2019-12-08 RX ADMIN — MUPIROCIN: 20 OINTMENT TOPICAL at 21:26

## 2019-12-08 RX ADMIN — OXYCODONE HYDROCHLORIDE 10 MG: 5 TABLET ORAL at 18:44

## 2019-12-08 RX ADMIN — INSULIN LISPRO 2 UNITS: 100 INJECTION, SOLUTION INTRAVENOUS; SUBCUTANEOUS at 17:44

## 2019-12-08 RX ADMIN — CARVEDILOL 3.12 MG: 3.12 TABLET, FILM COATED ORAL at 08:31

## 2019-12-08 RX ADMIN — LEVETIRACETAM 500 MG: 500 TABLET ORAL at 08:32

## 2019-12-08 RX ADMIN — CALCIUM ACETATE 667 MG: 667 CAPSULE ORAL at 09:33

## 2019-12-08 RX ADMIN — FOLIC ACID 1 MG: 1 TABLET ORAL at 08:31

## 2019-12-08 RX ADMIN — SODIUM BICARBONATE 50 MEQ: 84 INJECTION, SOLUTION INTRAVENOUS at 09:33

## 2019-12-08 RX ADMIN — SODIUM BICARBONATE 650 MG TABLET 1300 MG: at 20:16

## 2019-12-08 RX ADMIN — ALBUMIN HUMAN 25 G: 0.25 SOLUTION INTRAVENOUS at 01:17

## 2019-12-08 RX ADMIN — BUPRENORPHINE HYDROCHLORIDE 20 MG: 8 TABLET SUBLINGUAL at 08:31

## 2019-12-08 RX ADMIN — LACOSAMIDE 100 MG: 100 TABLET, FILM COATED ORAL at 20:15

## 2019-12-08 RX ADMIN — INSULIN HUMAN 5 UNITS: 100 INJECTION, SOLUTION PARENTERAL at 09:42

## 2019-12-08 RX ADMIN — SODIUM CHLORIDE 75 ML/HR: 900 INJECTION, SOLUTION INTRAVENOUS at 20:14

## 2019-12-08 RX ADMIN — GABAPENTIN 300 MG: 300 CAPSULE ORAL at 06:17

## 2019-12-08 RX ADMIN — CARVEDILOL 3.12 MG: 3.12 TABLET, FILM COATED ORAL at 17:45

## 2019-12-08 RX ADMIN — OYSTER SHELL CALCIUM WITH VITAMIN D 1 TABLET: 500; 200 TABLET, FILM COATED ORAL at 08:31

## 2019-12-08 RX ADMIN — GABAPENTIN 300 MG: 300 CAPSULE ORAL at 15:50

## 2019-12-08 RX ADMIN — CALCIUM ACETATE 667 MG: 667 CAPSULE ORAL at 17:44

## 2019-12-08 NOTE — PROGRESS NOTES
[facility]   INPATIENT PROGRESS NOTE    Date of Admission: 12/6/2019  Length of Stay: 1  Primary Care Physician: Lori Hale NP    Subjective    Sleepy and weak  Subjective   CC: worsening renal function    HPI:    39 y.o. male  With complex medical history of CAPS, recurrent PE and DVT on arixtra, L foot amputation due to clot, s/p LLE bypass surgery, chronic hypoxia on home O2 24/7, liver cirrhosis with ascites, s/p paracentesis twice, SLE and CKD stage 3 who was sent in by  due to worsening renal function.Cr 3.9. Baseline 1.6-1.7.   He denies any change in his health but reports worsening ascites lately. Denies weight gain. Unable to eat much due to easy satiety due to ascites. But still eating and drinking home. Denies abdominal pain or fever  No JVD. Trace leg edema.      Labs reviewed. Chronic anemia steady. No sign of volume overload but will do CXR. No acidosis. Normal LFTs  Vs- low BP 90-100s on multiple BP regimen, no fever. No tachycardia     Pt was admitted for further management.     Review Of Systems:   Constitutional: Positive for activity change, appetite change and fatigue.   HENT: Negative.    Eyes: Negative.    Respiratory: Positive for shortness of breath.    Cardiovascular: Negative.    Gastrointestinal: Positive for abdominal distention.   Endocrine: Negative.    Genitourinary: Negative.    Musculoskeletal: Positive for arthralgias, gait problem and myalgias.   Skin: Negative.    Allergic/Immunologic: Negative.    Neurological: Positive for weakness.   Hematological: Negative.    Psychiatric/Behavioral: Positive for dysphoric mood.     Objective    sleepy  Objective    Physical Exam:  Temp:  [97.4 °F (36.3 °C)-98.5 °F (36.9 °C)] 98.3 °F (36.8 °C)  Heart Rate:  [62-69] 64  Resp:  [10-18] 16  BP: (105-132)/(62-73) 132/73    Constitutional: He is oriented to person, place, and time. He appears well-developed. No distress.   HENT:   Head: Normocephalic and atraumatic.   Nose: Nose  normal.   Mouth/Throat: No oropharyngeal exudate.   Eyes: Conjunctivae and EOM are normal. Pupils are equal, round, and reactive to light.   Neck: Normal range of motion. Neck supple.   Cardiovascular: Normal rate and regular rhythm.   Murmur heard.  Pulmonary/Chest: Effort normal and breath sounds normal. No respiratory distress. He has no wheezes. He has no rales. He exhibits no tenderness.   Abdominal: Soft. Bowel sounds are normal. He exhibits distension. There is no tenderness. There is no rebound.   Musculoskeletal: Normal range of motion.   Neurological: He is alert and oriented to person, place, and time. No cranial nerve deficit.   L foot MTA   Skin: Skin is warm and dry.   Very poor pedal pulse in both feet   Psychiatric: He has a normal mood and affect. His behavior is normal. Judgment and thought content normal    Results Review:    I have personally reviewed most recent cardiac tracings, lab results and radiology images and interpretations and agree with findings, most notably:  .      Results from last 7 days   Lab Units 12/08/19  0334 12/07/19  0721 12/06/19  1540   WBC 10*3/mm3 3.20* 5.10 6.60   HEMOGLOBIN g/dL 8.4* 8.0* 8.6*   HEMATOCRIT % 25.6* 24.9* 26.6*   PLATELETS 10*3/mm3 106* 107* 118*     Results from last 7 days   Lab Units 12/08/19  0334 12/07/19  0721 12/06/19  1540 12/04/19  1617   SODIUM mmol/L 134* 133* 134* 132*   POTASSIUM mmol/L 5.6* 5.0 5.0 5.5*   CHLORIDE mmol/L 97* 98 94* 94*   CO2 mmol/L 21.0* 22.0 26.0 26.0   BUN mg/dL 93* 100* 96* 93*   CREATININE mg/dL 3.61* 3.93* 3.92* 3.89*   GLUCOSE mg/dL 203* 99 99 117*   CALCIUM mg/dL 8.3* 8.3* 8.4* 8.5*   ALK PHOS U/L 88  --  95 96   ALT (SGPT) U/L <5  --  7 7   AST (SGOT) U/L 7  --  7 7     TSH (uIU/mL)   Date/Time Value   12/07/2019 0837 2.780     Microbiology Results Abnormal     Procedure Component Value - Date/Time    Eosinophil Smear - Urine, Urine, Clean Catch [[id number]]  (Normal) Collected:  12/07/19 2547    Lab Status:  Final  result Specimen:  Urine, Clean Catch Updated:  12/07/19 1925     Eosinophil Smear 0 % EOS/100 Cells         Xr Chest 1 View    Result Date: 12/6/2019  There are postsurgical changes in the right chest as well as sternotomy wires and surgical clips in the mediastinum. No significant change in alveolar and airspace opacities bilaterally which may be chronic or due to recurrent infection.   Electronically Signed By-Yogesh Salmeron DO. On:12/6/2019 8:18 PM This report was finalized on [id number] by  Yogesh Salmeron DO..    Us Renal Bilateral    Result Date: 12/6/2019  Markedly echogenic kidneys with mild cortical thinning. No hydronephrosis. Moderate amount of ascites.  Electronically Signed By-Yogesh Salmeron DO. On:12/6/2019 11:20 PM This report was finalized on [id number] by  Yogesh Salmeron DO..           I have reviewed the medications.    Assessment/Plan   Assessment/Plan   Brief Hospital Course:      Active Hospital Problems:  * Acute renal failure superimposed on chronic kidney disease (CMS/HCC)- (present on admission)  Etiology; prerenal vs renal /possible lupus flare up per renal- may need steroid  Recently completed zithromax for 5 days for URI sx  Non oliguric per pt  Urine lyte to rule out dehydration  Hypotensive- on multiple regimen home, hold amlodipine, clonidine but continue on Coreg at low dose  Renal US - no obstruction but mild cortical thinning, echogenic kidney  Given ascites with worsening, rule out hepatorenal syndrome  Will given another saline 500ml- total 1 liter, apply albumin 25g x2 q8hrs for now  Nephrology on board  Unchanged in Cr         Other ascites- (present on admission)  S/p 2 paracentesis - last one 8/2019  Getting worse gradually recently   May need paracentesis to avoid compression effect from worsening ascites- denies fever or abdominal pain.no leukocytosis. Not likely SBP.  a few dose of albumin  Unchanged in ascites since admission       Chronic pain disorder- (present on  admission)  On subotex due to addiction issue initially but due to pain control also since he had 2 major lung surgery  Followed by pain clinic  Wanting to change pain med since pain is not contolled  Consult to pain clinic   Watch for constipation for opiate related    Hyperkalemia- (present on admission)  Due to renal failure  Watch. Not on k supplement     Anemia, chronic disease- (present on admission)  Due to mild JUNG and ACD  Followed by Dr. Mak for CAPS     Amputation of foot (CMS/HCC)  Due to arterial clot in LLE     Chronic respiratory failure with hypoxia (CMS/HCC)  Wearing O2, ryann desat at night  Etiology - due to lobectomy of lung or other  nonsmoker     SLE (systemic lupus erythematosus) (CMS/HCC)- (present on admission)  Not on specific med     Deep vein thrombosis (DVT) of lower extremity (CMS/HCC)- (present on admission)  On Arixtra     Benign essential hypertension- (present on admission)  On clonidine, coreg and amlodipine and bumex  Since BP low 90-100s - hold except low dose coreg     PVD (peripheral vascular disease) (CMS/HCC)- (present on admission)  Got clot on LLE at the age 14, s/p bypass surgery     Hepatic cirrhosis (CMS/HCC)- (present on admission)  Etiology- unclear, possibly related to lupus per pt   Normal LFTs    CAPS (catastrophic antiphospholipid syndrome) (CMS/HCC)- (present on admission)  Multiple recurrent PE and DVT -s/p lobectomy, both lung due to clots    S/p plasmapheresis, iv IG, high dose steroid, this year   On arixtra- initially coumadin and lovenox  Dr. Mak - hematologist  Not following with rheumatology          DVT prophylaxis:  Discharge Planning: I expect patient to be discharged to home in a few days.    Pretty Sevilla MD, 12/08/19 7:10 AM

## 2019-12-08 NOTE — PLAN OF CARE
Problem: Patient Care Overview  Goal: Plan of Care Review  Outcome: Ongoing (interventions implemented as appropriate)  Flowsheets (Taken 12/8/2019 1235)  Plan of Care Reviewed With: patient

## 2019-12-08 NOTE — PROGRESS NOTES
NEPHROLOGY PROGRESS NOTE------[facility]    [facility]  [phone]  Jorge Luis Goodwin MD      Patient Care Team:  Lori Hale NP as PCP - General (Family Medicine)  Lori Hale NP as PCP - Claims Attributed  Lori Hale NP as PCP - Family Medicine  Oma Mak MD as Consulting Physician (Hematology and Oncology)  Pito Goodwin MD as Consulting Physician (Nephrology)      Provider:  Jorge Luis Goodwin MD  Patient Name: Lorenzo Crockett  :  1980    SUBJECTIVE:    No SOB, CP, palpitations, cramping. Still weak. No dysuria    Medication:    buprenorphine 20 mg Sublingual Daily   calcium acetate 667 mg Oral TID With Meals   calcium gluconate 1 g Intravenous Once   calcium gluconate 1 g Intravenous Once   calcium-vitamin D 1 tablet Oral BID   carvedilol 3.125 mg Oral BID With Meals   clonazePAM 0.5 mg Oral BID   febuxostat 40 mg Oral Daily   folic acid 1 mg Oral Daily   fondaparinux 7.5 mg Subcutaneous Daily   gabapentin 300 mg Oral Q8H   insulin regular 5 Units Intravenous Once   iron sucrose 200 mg Intravenous Q24H   lacosamide 100 mg Oral BID   levETIRAcetam 500 mg Oral Q12H   methylPREDNISolone sodium succinate 250 mg Intravenous Q24H   midodrine 5 mg Oral TID AC   sertraline 50 mg Oral Daily   sodium bicarbonate 50 mEq Intravenous Once   sodium bicarbonate 1,300 mg Oral TID   sodium chloride 10 mL Intravenous Q12H   sodium polystyrene 30 g Oral Once   vitamin B-12 250 mcg Oral Daily       sodium chloride 75 mL/hr Last Rate: 75 mL/hr (19 0446)       OBJECTIVE    Vital Sign Min/Max for last 24 hours  Temp  Min: 97.4 °F (36.3 °C)  Max: 98.5 °F (36.9 °C)   BP  Min: 107/63  Max: 132/73   Pulse  Min: 64  Max: 69   Resp  Min: 10  Max: 18   SpO2  Min: 94 %  Max: 100 %   No data recorded   Weight  Min: 84.8 kg (186 lb 15.2 oz)  Max: 84.8 kg (186 lb 15.2 oz)     Flowsheet Rows      First Filed Value   Admission Height  185.4 cm  "(73\") Documented at 12/06/2019 1422   Admission Weight  84.5 kg (186 lb 4.6 oz) Documented at 12/06/2019 1422          No intake/output data recorded.  I/O last 3 completed shifts:  In: 1836 [P.O.:720; I.V.:1066; IV Piggyback:50]  Out: 1220 [Urine:1220]    Physical Exam:  General Appearance: alert, appears stated age and cooperative  Head: normocephalic, without obvious abnormality and atraumatic  Eyes: conjunctivae and sclerae normal and no icterus  Neck: supple and no JVD  Lungs: DECREASED BS BIBASILAR WITH FEW SCATTERED RHONCHI  Heart: regular rhythm & normal rate and normal S1, S2 +DEDE  Chest: Wall no abnormalities observed  Abdomen: normal bowel sounds and soft non-tender  Extremities: moves extremities well, no edema, no cyanosis and no redness  Skin: +LE WOUND  Neurologic: Alert, and oriented. No focal deficits    Labs:    WBC WBC   Date Value Ref Range Status   12/08/2019 3.20 (L) 3.40 - 10.80 10*3/mm3 Final   12/07/2019 5.10 3.40 - 10.80 10*3/mm3 Final   12/06/2019 6.60 3.40 - 10.80 10*3/mm3 Final      HGB Hemoglobin   Date Value Ref Range Status   12/08/2019 8.4 (L) 13.0 - 17.7 g/dL Final   12/07/2019 8.0 (L) 13.0 - 17.7 g/dL Final   12/06/2019 8.6 (L) 13.0 - 17.7 g/dL Final      HCT Hematocrit   Date Value Ref Range Status   12/08/2019 25.6 (L) 37.5 - 51.0 % Final   12/07/2019 24.9 (L) 37.5 - 51.0 % Final   12/06/2019 26.6 (L) 37.5 - 51.0 % Final      Platlets No results found for: LABPLAT   MCV MCV   Date Value Ref Range Status   12/08/2019 87.4 79.0 - 97.0 fL Final     Comment:     Result checked    12/07/2019 92.0 79.0 - 97.0 fL Final   12/06/2019 90.7 79.0 - 97.0 fL Final          Sodium Sodium   Date Value Ref Range Status   12/08/2019 134 (L) 136 - 145 mmol/L Final   12/07/2019 133 (L) 136 - 145 mmol/L Final   12/06/2019 134 (L) 136 - 145 mmol/L Final      Potassium Potassium   Date Value Ref Range Status   12/08/2019 5.6 (H) 3.5 - 5.2 mmol/L Final   12/07/2019 5.0 3.5 - 5.2 mmol/L Final "   12/06/2019 5.0 3.5 - 5.2 mmol/L Final      Chloride Chloride   Date Value Ref Range Status   12/08/2019 97 (L) 98 - 107 mmol/L Final   12/07/2019 98 98 - 107 mmol/L Final   12/06/2019 94 (L) 98 - 107 mmol/L Final      CO2 CO2   Date Value Ref Range Status   12/08/2019 21.0 (L) 22.0 - 29.0 mmol/L Final   12/07/2019 22.0 22.0 - 29.0 mmol/L Final   12/06/2019 26.0 22.0 - 29.0 mmol/L Final      BUN BUN   Date Value Ref Range Status   12/08/2019 93 (H) 6 - 20 mg/dL Final   12/07/2019 100 (H) 6 - 20 mg/dL Final   12/06/2019 96 (H) 6 - 20 mg/dL Final      Creatinine Creatinine   Date Value Ref Range Status   12/08/2019 3.61 (H) 0.76 - 1.27 mg/dL Final   12/07/2019 3.93 (H) 0.76 - 1.27 mg/dL Final   12/06/2019 3.92 (H) 0.76 - 1.27 mg/dL Final      Calcium Calcium   Date Value Ref Range Status   12/08/2019 8.3 (L) 8.6 - 10.5 mg/dL Final   12/07/2019 8.3 (L) 8.6 - 10.5 mg/dL Final   12/06/2019 8.4 (L) 8.6 - 10.5 mg/dL Final      PO4 No components found for: PO4   Albumin Albumin   Date Value Ref Range Status   12/08/2019 3.80 3.50 - 5.20 g/dL Final   12/06/2019 3.20 (L) 3.50 - 5.20 g/dL Final      Magnesium Magnesium   Date Value Ref Range Status   12/08/2019 3.0 (H) 1.6 - 2.6 mg/dL Final      Uric Acid No components found for: URIC ACID     Imaging Results (Last 72 Hours)     Procedure Component Value Units Date/Time    US Renal Bilateral [[id number]] Collected:  12/06/19 2317     Updated:  12/06/19 2328    Narrative:       DATE OF EXAM:  12/6/2019 8:43 PM     PROCEDURE:  US RENAL BILATERAL-     INDICATIONS:  acute renal failure; N17.9-Acute kidney failure, unspecified;  N18.9-Chronic kidney disease, unspecified; D64.9-Anemia, unspecified     COMPARISON:  Renal ultrasound dated 08/21/2019     TECHNIQUE:   Grayscale and color Doppler ultrasound evaluation of the kidneys and  urinary bladder was performed.        FINDINGS:  Moderate amount of ascites is present. Kidneys are markedly echogenic  with mild cortical thinning. No  hydronephrosis. Kidneys measure  approximately 11.3 and 9.3 cm in [id number] length on the right and  left respectively. The Bladder is empty.        Impression:       Markedly echogenic kidneys with mild cortical thinning. No  hydronephrosis.  Moderate amount of ascites.     Electronically Signed By-Yogesh Salmeron DO. On:12/6/2019 11:20 PM  This report was finalized on [id number] by  Yogesh Salmeron DO..    XR Chest 1 View [[id number]] Collected:  12/06/19 2014     Updated:  12/06/19 2020    Narrative:       XR CHEST 1 VW-     Date of Exam: 12/6/2019 6:55 PM     Indication: sob; N17.9-Acute kidney failure, unspecified; N18.9-Chronic  kidney disease, unspecified; D64.9-Anemia, unspecified  39-year-old  history of lupus, on home oxygen, anemia     Comparison: 08/21/2019, 03/29/2019     Technique: 1 view(s) of the chest were obtained.     FINDINGS: There is still volume loss in the right chest which is  likely postsurgical. There are also sternotomy wires and postsurgical  changes in the mediastinum. Alveolar and interstitial opacities  bilaterally are present similar in appearance and distribution to  8/21/2019 and 3/29/2019. No pneumothorax or definite pleural effusion.  There is calcification along the right diaphragm which is unchanged.  Trachea is midline. No definite pleural effusions.       Impression:       There are postsurgical changes in the right chest as well as sternotomy  wires and surgical clips in the mediastinum. No significant change in  alveolar and airspace opacities bilaterally which may be chronic or due  to recurrent infection.        Electronically Signed By-Yogesh Salmeron DO. On:12/6/2019 8:18 PM  This report was finalized on [id number] by  Yogesh Salmeron DO..          Results for orders placed during the hospital encounter of 12/06/19   XR Chest 1 View    Narrative XR CHEST 1 VW-     Date of Exam: 12/6/2019 6:55 PM     Indication: sob; N17.9-Acute kidney failure, unspecified;  N18.9-Chronic  kidney disease, unspecified; D64.9-Anemia, unspecified  39-year-old  history of lupus, on home oxygen, anemia     Comparison: 08/21/2019, 03/29/2019     Technique: 1 view(s) of the chest were obtained.     FINDINGS: There is still volume loss in the right chest which is  likely postsurgical. There are also sternotomy wires and postsurgical  changes in the mediastinum. Alveolar and interstitial opacities  bilaterally are present similar in appearance and distribution to  8/21/2019 and 3/29/2019. No pneumothorax or definite pleural effusion.  There is calcification along the right diaphragm which is unchanged.  Trachea is midline. No definite pleural effusions.       Impression There are postsurgical changes in the right chest as well as sternotomy  wires and surgical clips in the mediastinum. No significant change in  alveolar and airspace opacities bilaterally which may be chronic or due  to recurrent infection.        Electronically Signed By-Yogesh Salmeron DO. On:12/6/2019 8:18 PM  This report was finalized on [id number] by  Yogesh Salmeron DO..              ASSESSMENT / PLAN      Acute renal failure superimposed on chronic kidney disease (CMS/HCC)    CAPS (catastrophic antiphospholipid syndrome) (CMS/HCC)    Benign essential hypertension    Chronic pain disorder    Deep vein thrombosis (DVT) of lower extremity (CMS/HCC)    Hepatic cirrhosis (CMS/HCC)    PVD (peripheral vascular disease) (CMS/HCC)    SLE (systemic lupus erythematosus) (CMS/HCC)    Other ascites    Anemia, chronic disease    Hyperkalemia      1. ARF/TABBY/CRF/CKD STG 3---- Nonoliguric. Creatinine down a little. +ARF/TABBY on top of known CRF/CKD stage 3, with a baseline serum creatinine of around 1.6-1.7. CRF/CKD STG 3 is secondary to HTN NS/SLE/history of biopsy proven MERYL which may be recurrent. Renal US ok. +ARF/TABBY appears to be secondary to prerenal state and occasional NSAID use and likely recurrent MERYL. No obstructive uropathy  on US. Cautiously hydrate. No NSAIDs. No IV dye unless urgently needed.  Dose medsfor CrCL<10 cc/min. Follow for dialysis need. No obvious uremia at present. Patient refuses renal biopsy and would be high risk given thrombocytopenia anyway     2. PERSISTENT PROTEINURIA---Quantify. 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 Today is day # 2 of IV Solumedrol. Refuses biopsy and not likely to be beneficial at present.     3. HISTORY OF LUPUS NEPHRITIS--- On day # 2 of pulse dose steroids.     4. HISTORY OF MINIMAL CHANGE DISEASE--- quantify proteinuria.  Steroids for likely relapse     5. HTN WITH CKD--- BP low. Avoid hypotension.     6. HYPOALBUMINEMIA--- secondary to proteinuria and poor PO intake. S/P IV Albumin to temporize     7. ANEMIA OF CKD--- Venofer for JUNG. Follow for PRBC need. EPO started     8. HYPONATREMIA-- Hypotonic and clinically hypovolemic. Giving NS and follow     9. HYPOCALCEMIA--- Replace IV. Follow ionized levels     10. HYPERKALEMIA--- Med Rx with Kayexalate, bicarb, IV Humulin R (without D50 b/c sugars high), and CaGluconate. On K+ restriction in diet. On telemetry. Follow levels closely     11. CUMMINS---- Follow ascites for paracentesis need     12. MEDICAL NONCOMPLIANCE      13. PANCYTOPENIA-------?recurrent autoimmune thrombocytopenia. Last hospitalization, patient required plasmapheresis. , Hem/Onc to see    14. ACIDOSIS-------Metabolic. No elevation in AGAP. +Type 4 RTA. Give IV and po NaHCO3    15. RF ASSOCIATED HYPERPHOSPHATEMIA------Start Phoslo as binder and follow Phos    16. SECONDARY HYPERPARATHYROIDISM    Jorge Luis Goodwin MD  [facility]  [phone]  12/08/19  8:47 AM

## 2019-12-08 NOTE — PROGRESS NOTES
[facility]   INPATIENT PROGRESS NOTE    Date of Admission: 12/6/2019  Length of Stay: 1  Primary Care Physician: Lori Hale NP    Subjective    Feels better  Subjective   CC: worsening renal function    HPI:    39 y.o. male  With complex medical history of CAPS, recurrent PE and DVT on arixtra, L foot amputation due to clot, s/p LLE bypass surgery, chronic hypoxia on home O2 24/7, liver cirrhosis with ascites, s/p paracentesis twice, SLE and CKD stage 3 who was sent in by  due to worsening renal function.Cr 3.9. Baseline 1.6-1.7.   He denies any change in his health but reports worsening ascites lately. Denies weight gain. Unable to eat much due to easy satiety due to ascites. But still eating and drinking home. Denies abdominal pain or fever  No JVD. Trace leg edema.      Labs reviewed. Chronic anemia steady. No sign of volume overload but will do CXR. No acidosis. Normal LFTs  Vs- low BP 90-100s on multiple BP regimen, no fever. No tachycardia     Pt was admitted for further management.     Review Of Systems:   Constitutional: Positive for activity change, appetite change and fatigue.   HENT: Negative.    Eyes: Negative.    Respiratory: Positive for shortness of breath.    Cardiovascular: Negative.    Gastrointestinal: Positive for abdominal distention.   Endocrine: Negative.    Genitourinary: Negative.    Musculoskeletal: Positive for arthralgias, gait problem and myalgias.   Skin: Negative.    Allergic/Immunologic: Negative.    Neurological: Positive for weakness.   Hematological: Negative.    Psychiatric/Behavioral: Positive for dysphoric mood.     Objective    Feels better  Cr trending down, non oliguric - UOP 1220 last 24hrs  Objective    Physical Exam:  Temp:  [97.4 °F (36.3 °C)-98.5 °F (36.9 °C)] 97.8 °F (36.6 °C)  Heart Rate:  [64-84] 84  Resp:  [10-18] 18  BP: (112-142)/(65-80) 137/69    Constitutional: He is oriented to person, place, and time. He appears well-developed. No distress.    HENT:   Head: Normocephalic and atraumatic.   Nose: Nose normal.   Mouth/Throat: No oropharyngeal exudate.   Eyes: Conjunctivae and EOM are normal. Pupils are equal, round, and reactive to light.   Neck: Normal range of motion. Neck supple.   Cardiovascular: Normal rate and regular rhythm.   Murmur heard.  Pulmonary/Chest: Effort normal and breath sounds normal. No respiratory distress. He has no wheezes. He has no rales. He exhibits no tenderness.   Abdominal: Soft. Bowel sounds are normal. He exhibits distension. There is no tenderness. There is no rebound.   Musculoskeletal: Normal range of motion.   Neurological: He is alert and oriented to person, place, and time. No cranial nerve deficit.   L foot MTA   Skin: Skin is warm and dry.   Very poor pedal pulse in both feet   Psychiatric: He has a normal mood and affect. His behavior is normal. Judgment and thought content normal    Results Review:    I have personally reviewed most recent cardiac tracings, lab results and radiology images and interpretations and agree with findings, most notably:  .      Results from last 7 days   Lab Units 12/08/19  0334 12/07/19  0721 12/06/19  1540   WBC 10*3/mm3 3.20* 5.10 6.60   HEMOGLOBIN g/dL 8.4* 8.0* 8.6*   HEMATOCRIT % 25.6* 24.9* 26.6*   PLATELETS 10*3/mm3 106* 107* 118*     Results from last 7 days   Lab Units 12/08/19  1247 12/08/19  0334 12/07/19  0721 12/06/19  1540 12/04/19  1617   SODIUM mmol/L  --  134* 133* 134* 132*   POTASSIUM mmol/L 5.0 5.6* 5.0 5.0 5.5*   CHLORIDE mmol/L  --  97* 98 94* 94*   CO2 mmol/L  --  21.0* 22.0 26.0 26.0   BUN mg/dL  --  93* 100* 96* 93*   CREATININE mg/dL  --  3.61* 3.93* 3.92* 3.89*   GLUCOSE mg/dL  --  203* 99 99 117*   CALCIUM mg/dL  --  8.3* 8.3* 8.4* 8.5*   ALK PHOS U/L  --  88  --  95 96   ALT (SGPT) U/L  --  <5  --  7 7   AST (SGOT) U/L  --  7  --  7 7     TSH (uIU/mL)   Date/Time Value   12/07/2019 0837 2.780     Microbiology Results Abnormal     Procedure Component Value -  Date/Time    Eosinophil Smear - Urine, Urine, Clean Catch [[id number]]  (Normal) Collected:  12/07/19 1824    Lab Status:  Final result Specimen:  Urine, Clean Catch Updated:  12/07/19 1925     Eosinophil Smear 0 % EOS/100 Cells         Xr Chest 1 View    Result Date: 12/6/2019  There are postsurgical changes in the right chest as well as sternotomy wires and surgical clips in the mediastinum. No significant change in alveolar and airspace opacities bilaterally which may be chronic or due to recurrent infection.   Electronically Signed By-Yogesh Salmeron DO. On:12/6/2019 8:18 PM This report was finalized on [id number] by  Yogesh Salmeron DO..    Us Renal Bilateral    Result Date: 12/6/2019  Markedly echogenic kidneys with mild cortical thinning. No hydronephrosis. Moderate amount of ascites.  Electronically Signed By-Yogesh Salmeron DO. On:12/6/2019 11:20 PM This report was finalized on [id number] by  Yogesh Salmeron DO..           I have reviewed the medications.    Assessment/Plan   Assessment/Plan   Brief Hospital Course:      Active Hospital Problems:  * Acute renal failure superimposed on chronic kidney disease (CMS/HCC)- (present on admission)  Etiology; prerenal vs renal /possible lupus/MERYL flare up on CKD stage 3 per renal- on pulse dose steroid  Recently completed zithromax for 5 days for URI sx  Non oliguric per pt  Urine lyte   Hypotensive- on multiple regimen home, hold amlodipine, clonidine but continue on Coreg at low dose  Renal US - no obstruction but mild cortical thinning, echogenic kidney  Fluid and albumin 25g x2    Nephrology on board  Cr trending down         Other ascites- (present on admission)  S/p 2 paracentesis - last one 8/2019  Getting worse gradually recently   May need paracentesis to avoid compression effect from worsening ascites- denies fever or abdominal pain.no leukocytosis. Not likely SBP.  a few dose of albumin  Unchanged in ascites since admission       Hyperkalemia-  (present on admission)  Due to renal failure  Receive multiple regimen by renal  Repeat am    Chronic pain disorder- (present on admission)  On subotex due to addiction issue initially but due to pain control also since he had 2 major lung surgery  Followed by pain clinic  Wanting to change pain med since pain is not contolled  Consult to pain clinic   Watch for constipation for opiate related    Anemia, chronic disease- (present on admission)  Due to mild JUNG and ACD  Followed by Dr. Mak for CAPS   Iv and po iron and procrit per oncology  B12 supplement    Amputation of foot (CMS/HCC)  Due to arterial clot in LLE     Chronic respiratory failure with hypoxia (CMS/HCC)  Wearing O2, ryann desat at night  Etiology - due to lobectomy of lung or other  nonsmoker     SLE (systemic lupus erythematosus) (CMS/HCC)- (present on admission)  Not on specific med     Deep vein thrombosis (DVT) of lower extremity (CMS/HCC)- (present on admission)  On Arixtra     Benign essential hypertension- (present on admission)  On clonidine, coreg and amlodipine and bumex home - held except low dose coreg  bp improved, on midodrine also    Secondary hyperparathyroidism (CMS/HCC)- (present on admission)   Due to CKD    PVD (peripheral vascular disease) (CMS/HCC)- (present on admission)  Got clot on LLE at the age 14, s/p bypass surgery     Hepatic cirrhosis (CMS/HCC)- (present on admission)  Etiology- unclear, possibly related to lupus per pt   Normal LFTs    CAPS (catastrophic antiphospholipid syndrome) (CMS/HCC)- (present on admission)  Multiple recurrent PE and DVT -s/p lobectomy, both lung due to clots    S/p plasmapheresis, iv IG, high dose steroid, this year   On arixtra- initially coumadin and lovenox  Dr. Mak - hematologist  Not following with rheumatology          DVT prophylaxis:  Discharge Planning: I expect patient to be discharged to home in a few days.    Pretty Sevilla MD, 12/08/19 7:10 AM

## 2019-12-08 NOTE — PLAN OF CARE
"Progress is minimal; VSS; patient has had increased disorientation/confused odd behavior throughout shift; Fall prevention program initiated to maintain safety as patient is impulsive and found with IV chamber off pump and dangling in the floor, telemetry removed, clothing removed;     Decreased urine output; patient states he is experiencing \"fullness\" but unable to empty adequate amounts of urine.  Bladder scanned for 332 ml in bladder; patient refused catheterization.    Continue to monitor progress  "

## 2019-12-08 NOTE — CONSULTS
Infectious Diseases Consult Note    Referring Provider: Pretty Contreras MD    Reason for Consultation: Left leg wound    Patient Care Team:  Lori Hale NP as PCP - General (Family Medicine)  Lori Hale NP as PCP - Claims Attributed  Lori Hale NP as PCP - Family Medicine  Oma Mak MD as Consulting Physician (Hematology and Oncology)  Pito Goodwin MD as Consulting Physician (Nephrology)    Chief complaint acute kidney failure    Subjective     History of present illness:      This is a 39-year-old white male who was hospitalized [facility] on December 6, 2019 with abnormal blood work was found to have acute kidney failure on top of chronic kidney disease.  Patient is known to have lupus but is not receiving immunosuppressive therapy is been diagnosed with lupus nephritis.  The patient was started on IV steroids.  The patient has a chronic left leg wound in the anterior shin secondary to venous stasis changes.  He previously has blood clots in the lower extremities resulted with infection in his left foot and required left foot transmetatarsal amputation.  According to him the wound does not appear worse than the baseline has minimal drainage.  The patient was apparently put on IV Zosyn.    Review of Systems   Review of Systems   Constitutional: Positive for fatigue.   HENT: Negative.    Eyes: Negative.    Respiratory: Negative.    Cardiovascular: Negative.    Gastrointestinal: Positive for abdominal distention.   Genitourinary: Negative.    Musculoskeletal: Negative.    Skin: Positive for wound.   Neurological: Negative.    Hematological: Negative.    Psychiatric/Behavioral: Negative.        Medications  Medications Prior to Admission   Medication Sig Dispense Refill Last Dose   • amLODIPine (NORVASC) 10 MG tablet Take 10 mg by mouth Daily.   12/6/2019 at Unknown time   • bumetanide (BUMEX) 1 MG tablet Take 1 mg by mouth 2 (Two) Times a Day.   12/6/2019 at Unknown time   •  buprenorphine (SUBUTEX) 8 MG sublingual tablet SL tablet Place 20 mg under the tongue Daily. Unknown if pt took dose 08/21/19 & quantity   12/6/2019 at Unknown time   • calcium carbonate (OS-BLU) 600 MG tablet Take 600 mg by mouth 2 (Two) Times a Day.   12/6/2019 at Unknown time   • carvedilol (COREG) 25 MG tablet Take 25 mg by mouth 2 (Two) Times a Day With Meals.   12/6/2019 at Unknown time   • clonazePAM (KlonoPIN) 0.5 MG tablet Take 0.5 mg by mouth 2 (Two) Times a Day.   12/6/2019 at Unknown time   • cloNIDine (CATAPRES) 0.1 MG tablet Take 0.1 mg by mouth 3 (Three) Times a Day.   12/6/2019 at Unknown time   • Cyanocobalamin (B-12) 500 MCG sublingual tablet Place 1 tablet under the tongue 2 (Two) Times a Day.   12/6/2019 at Unknown time   • famotidine (PEPCID) 20 MG tablet Take 20 mg by mouth 2 (Two) Times a Day As Needed for Heartburn.   12/6/2019 at Unknown time   • ferrous sulfate 324 (65 Fe) MG tablet delayed-release EC tablet Take 324 mg by mouth Daily.   12/6/2019 at Unknown time   • folic acid (FOLVITE) 1 MG tablet Take 1 mg by mouth Daily.   12/6/2019 at Unknown time   • fondaparinux (ARIXTRA) 7.5 MG/0.6ML solution injection Inject 7.5 mg under the skin into the appropriate area as directed Daily.   12/5/2019 at Unknown time   • gabapentin (NEURONTIN) 800 MG tablet Take 800 mg by mouth 4 (Four) Times a Day.   12/6/2019 at Unknown time   • lacosamide (VIMPAT) 100 MG tablet tablet Take 100 mg by mouth 2 (Two) Times a Day.   12/6/2019 at Unknown time   • levETIRAcetam (KEPPRA) 500 MG tablet Take 500 mg by mouth 2 (Two) Times a Day.   12/6/2019 at Unknown time   • sertraline (ZOLOFT) 50 MG tablet Take 50 mg by mouth Daily.   12/6/2019 at Unknown time   • baclofen (LIORESAL) 20 MG tablet Take 0.5 tablets by mouth 3 (Three) Times a Day As Needed for Muscle Spasms.   Unknown at Unknown time   • prochlorperazine (COMPAZINE) 5 MG tablet Take 5 mg by mouth Every 8 (Eight) Hours As Needed for Nausea or Vomiting.    Unknown at Unknown time   • QUEtiapine (SEROquel) 50 MG tablet Take 50 mg by mouth At Night As Needed (for sleep).   Unknown at Unknown time   • rOPINIRole (REQUIP) 0.5 MG tablet Take 0.5 mg by mouth Daily As Needed (for RLS).   Unknown at Unknown time       History  Past Medical History:   Diagnosis Date   • Blood clot associated with vein wall inflammation    • Cirrhosis of liver (CMS/HCC)    • Enlarged heart    • Histoplasmosis    • Hypertension    • Lupus (CMS/HCC)    • Renal disorder     40% function     Past Surgical History:   Procedure Laterality Date   • BEDSIDE PARACENTESIS  8/22/2019        • FOOT SURGERY Left     toe removal   • IR STENT PLACEMENT Left     leg   • LUNG REMOVAL, PARTIAL Right     right lower lobe   • LUNG SURGERY      clot removal       Family History  No family history on file.    Social History   reports that he has never smoked. His smokeless tobacco use includes chew. He reports that he has current or past drug history. Drug: Marijuana. He reports that he does not drink alcohol.    Allergies  Tramadol and Melon    Objective     Vital Signs   Vital Signs (last 24 hours)       12/07 0700  -  12/08 0659 12/08 0700  -  12/08 1554   Most Recent    Temp (°F) 97.4 -  98.5    97.8 -  98.3     97.8 (36.6)    Heart Rate 62 -  69    64 -  84     84    Resp 10 -  18    16 -  18     18    /62 -  123/70    132/73 -  142/80     137/69    SpO2 (%) 94 -  100    94 -  97     96          Physical Exam:  Physical Exam   Constitutional: He is oriented to person, place, and time. He appears well-developed and well-nourished.   HENT:   Head: Normocephalic and atraumatic.   Eyes: Pupils are equal, round, and reactive to light. EOM are normal.   Neck: Normal range of motion. Neck supple.   Cardiovascular: Normal rate, regular rhythm and normal heart sounds.   Pulmonary/Chest: Effort normal and breath sounds normal. No respiratory distress. He has no wheezes. He has no rales.   Abdominal: Soft. Bowel  sounds are normal. He exhibits distension. He exhibits no mass. There is no tenderness. There is no rebound and no guarding.   Musculoskeletal: Normal range of motion. He exhibits no edema or deformity.   Chronic venous stasis of both lower extremities.  On the left anterior shin patient has superficial wound with minimal drainage with no significant surrounding erythema.   Neurological: He is alert and oriented to person, place, and time. No cranial nerve deficit.   Skin: Skin is warm. No rash noted. No erythema.   Psychiatric: He has a normal mood and affect.   Nursing note and vitals reviewed.      Microbiology  Microbiology Results (last 10 days)     Procedure Component Value - Date/Time    Eosinophil Smear - Urine, Urine, Clean Catch [[id number]]  (Normal) Collected:  12/07/19 1824    Lab Status:  Final result Specimen:  Urine, Clean Catch Updated:  12/07/19 1925     Eosinophil Smear 0 % EOS/100 Cells           Laboratory  Results from last 7 days   Lab Units 12/08/19  0334   WBC 10*3/mm3 3.20*   HEMOGLOBIN g/dL 8.4*   HEMATOCRIT % 25.6*   PLATELETS 10*3/mm3 106*     Results from last 7 days   Lab Units 12/08/19  1247 12/08/19  0334   SODIUM mmol/L  --  134*   POTASSIUM mmol/L 5.0 5.6*   CHLORIDE mmol/L  --  97*   CO2 mmol/L  --  21.0*   BUN mg/dL  --  93*   CREATININE mg/dL  --  3.61*   GLUCOSE mg/dL  --  203*   CALCIUM mg/dL  --  8.3*     Results from last 7 days   Lab Units 12/08/19  1247 12/08/19  0334   SODIUM mmol/L  --  134*   POTASSIUM mmol/L 5.0 5.6*   CHLORIDE mmol/L  --  97*   CO2 mmol/L  --  21.0*   BUN mg/dL  --  93*   CREATININE mg/dL  --  3.61*   GLUCOSE mg/dL  --  203*   CALCIUM mg/dL  --  8.3*     Results from last 7 days   Lab Units 12/07/19  0721   CK TOTAL U/L 12*               Radiology  Imaging Results (Last 72 Hours)     Procedure Component Value Units Date/Time    US Renal Bilateral [[id number]] Collected:  12/06/19 2317     Updated:  12/06/19 2328    Narrative:       DATE OF  EXAM:  12/6/2019 8:43 PM     PROCEDURE:  US RENAL BILATERAL-     INDICATIONS:  acute renal failure; N17.9-Acute kidney failure, unspecified;  N18.9-Chronic kidney disease, unspecified; D64.9-Anemia, unspecified     COMPARISON:  Renal ultrasound dated 08/21/2019     TECHNIQUE:   Grayscale and color Doppler ultrasound evaluation of the kidneys and  urinary bladder was performed.        FINDINGS:  Moderate amount of ascites is present. Kidneys are markedly echogenic  with mild cortical thinning. No hydronephrosis. Kidneys measure  approximately 11.3 and 9.3 cm in [id number] length on the right and  left respectively. The Bladder is empty.        Impression:       Markedly echogenic kidneys with mild cortical thinning. No  hydronephrosis.  Moderate amount of ascites.     Electronically Signed By-Yogesh Salmeron DO. On:12/6/2019 11:20 PM  This report was finalized on [id number] by  Yogesh Salmeron DO..    XR Chest 1 View [[id number]] Collected:  12/06/19 2014     Updated:  12/06/19 2020    Narrative:       XR CHEST 1 VW-     Date of Exam: 12/6/2019 6:55 PM     Indication: sob; N17.9-Acute kidney failure, unspecified; N18.9-Chronic  kidney disease, unspecified; D64.9-Anemia, unspecified  39-year-old  history of lupus, on home oxygen, anemia     Comparison: 08/21/2019, 03/29/2019     Technique: 1 view(s) of the chest were obtained.     FINDINGS: There is still volume loss in the right chest which is  likely postsurgical. There are also sternotomy wires and postsurgical  changes in the mediastinum. Alveolar and interstitial opacities  bilaterally are present similar in appearance and distribution to  8/21/2019 and 3/29/2019. No pneumothorax or definite pleural effusion.  There is calcification along the right diaphragm which is unchanged.  Trachea is midline. No definite pleural effusions.       Impression:       There are postsurgical changes in the right chest as well as sternotomy  wires and surgical clips in the  mediastinum. No significant change in  alveolar and airspace opacities bilaterally which may be chronic or due  to recurrent infection.        Electronically Signed By-Yogesh Salmeron DO. On:12/6/2019 8:18 PM  This report was finalized on [id number] by  Yogesh Salmeron DO..          Cardiology      Results Review:  I have reviewed all clinical data, test, lab, and imaging results.       Schedule Meds    buprenorphine 20 mg Sublingual Daily   calcium acetate 667 mg Oral TID With Meals   calcium gluconate 1 g Intravenous Once   calcium-vitamin D 1 tablet Oral BID   carvedilol 3.125 mg Oral BID With Meals   clonazePAM 0.5 mg Oral BID   epoetin candy/candy-epbx 20,000 Units Subcutaneous Q14 Days   febuxostat 40 mg Oral Daily   folic acid 1 mg Oral Daily   fondaparinux 7.5 mg Subcutaneous Daily   gabapentin 300 mg Oral Q8H   insulin lispro 0-7 Units Subcutaneous 4x Daily With Meals & Nightly   iron sucrose 200 mg Intravenous Q24H   lacosamide 100 mg Oral BID   levETIRAcetam 500 mg Oral Q12H   methylPREDNISolone sodium succinate 250 mg Intravenous Q24H   midodrine 5 mg Oral TID AC   piperacillin-tazobactam 3.375 g Intravenous Q12H   sertraline 50 mg Oral Daily   sodium bicarbonate 1,300 mg Oral TID   sodium chloride 10 mL Intravenous Q12H   vitamin B-12 250 mcg Oral Daily       Infusion Meds    sodium chloride 75 mL/hr Last Rate: 75 mL/hr (12/08/19 0446)       PRN Meds  •  baclofen  •  bisacodyl  •  bisacodyl  •  calcium carbonate  •  dextrose  •  dextrose  •  famotidine  •  glucagon (human recombinant)  •  insulin lispro **AND** insulin lispro  •  melatonin  •  nitroglycerin  •  ondansetron **OR** ondansetron  •  oxyCODONE  •  prochlorperazine  •  QUEtiapine  •  rOPINIRole  •  [COMPLETED] Insert peripheral IV **AND** sodium chloride  •  sodium chloride      Assessment/Plan       Assessment    Left lower extremity venous stasis wound with possible mild infection if any.    Chronic kidney disease with acute kidney failure  on the top of that.  Most likely secondary to lupus nephritis    History of lupus anticoagulant.  Patient has thrombosis of the lower extremities in the past and has left foot transmetatarsal amputation    Jimenez    Plan    Discontinue IV Zosyn  Bactroban ointment twice daily on the left leg wound  Wound culture  Wound care    Senthil Hemphill MD  12/08/19  3:54 PM       Note is dictated utilizing voice recognition software/Dragon

## 2019-12-09 PROBLEM — L97.221 VENOUS STASIS ULCER OF LEFT CALF LIMITED TO BREAKDOWN OF SKIN WITHOUT VARICOSE VEINS (HCC): Chronic | Status: ACTIVE | Noted: 2019-08-22

## 2019-12-09 PROBLEM — I87.2 VENOUS STASIS ULCER OF LEFT CALF LIMITED TO BREAKDOWN OF SKIN WITHOUT VARICOSE VEINS (HCC): Chronic | Status: ACTIVE | Noted: 2019-08-22

## 2019-12-09 LAB
ALBUMIN SERPL-MCNC: 3.7 G/DL (ref 3.5–5.2)
ALBUMIN/GLOB SERPL: 1.2 G/DL
ALP SERPL-CCNC: 82 U/L (ref 39–117)
ALT SERPL W P-5'-P-CCNC: 10 U/L (ref 1–41)
ANION GAP SERPL CALCULATED.3IONS-SCNC: 15 MMOL/L (ref 5–15)
AST SERPL-CCNC: 8 U/L (ref 1–40)
BASOPHILS # BLD AUTO: 0 10*3/MM3 (ref 0–0.2)
BASOPHILS NFR BLD AUTO: 0.3 % (ref 0–1.5)
BILIRUB SERPL-MCNC: <0.2 MG/DL (ref 0.2–1.2)
BUN BLD-MCNC: 92 MG/DL (ref 6–20)
BUN/CREAT SERPL: 28.8 (ref 7–25)
CA-I SERPL ISE-MCNC: 1.21 MMOL/L (ref 1.2–1.3)
CALCIUM SPEC-SCNC: 8.9 MG/DL (ref 8.6–10.5)
CHLORIDE SERPL-SCNC: 100 MMOL/L (ref 98–107)
CO2 SERPL-SCNC: 25 MMOL/L (ref 22–29)
CREAT BLD-MCNC: 3.19 MG/DL (ref 0.76–1.27)
CREAT UR-MCNC: 98.7 MG/DL
DEPRECATED RDW RBC AUTO: 52.5 FL (ref 37–54)
EOSINOPHIL # BLD AUTO: 0 10*3/MM3 (ref 0–0.4)
EOSINOPHIL NFR BLD AUTO: 0 % (ref 0.3–6.2)
ERYTHROCYTE [DISTWIDTH] IN BLOOD BY AUTOMATED COUNT: 16.3 % (ref 12.3–15.4)
GFR SERPL CREATININE-BSD FRML MDRD: 22 ML/MIN/1.73
GLOBULIN UR ELPH-MCNC: 3 GM/DL
GLUCOSE BLD-MCNC: 145 MG/DL (ref 65–99)
GLUCOSE BLDC GLUCOMTR-MCNC: 124 MG/DL (ref 70–105)
GLUCOSE BLDC GLUCOMTR-MCNC: 164 MG/DL (ref 70–105)
GLUCOSE BLDC GLUCOMTR-MCNC: 164 MG/DL (ref 70–105)
GLUCOSE BLDC GLUCOMTR-MCNC: 206 MG/DL (ref 70–105)
HCT VFR BLD AUTO: 25.8 % (ref 37.5–51)
HGB BLD-MCNC: 8.4 G/DL (ref 13–17.7)
LYMPHOCYTES # BLD AUTO: 0.3 10*3/MM3 (ref 0.7–3.1)
LYMPHOCYTES NFR BLD AUTO: 4.1 % (ref 19.6–45.3)
MAGNESIUM SERPL-MCNC: 2.8 MG/DL (ref 1.6–2.6)
MCH RBC QN AUTO: 29.4 PG (ref 26.6–33)
MCHC RBC AUTO-ENTMCNC: 32.4 G/DL (ref 31.5–35.7)
MCV RBC AUTO: 90.9 FL (ref 79–97)
MONOCYTES # BLD AUTO: 0.2 10*3/MM3 (ref 0.1–0.9)
MONOCYTES NFR BLD AUTO: 2.9 % (ref 5–12)
NEUTROPHILS # BLD AUTO: 5.8 10*3/MM3 (ref 1.7–7)
NEUTROPHILS NFR BLD AUTO: 92.7 % (ref 42.7–76)
NRBC BLD AUTO-RTO: 0.1 /100 WBC (ref 0–0.2)
PHOSPHATE SERPL-MCNC: 6.2 MG/DL (ref 2.5–4.5)
PLATELET # BLD AUTO: 94 10*3/MM3 (ref 140–450)
PMV BLD AUTO: 7.8 FL (ref 6–12)
POTASSIUM BLD-SCNC: 4.4 MMOL/L (ref 3.5–5.2)
PROT SERPL-MCNC: 6.7 G/DL (ref 6–8.5)
PROT UR-MCNC: 229 MG/DL
PROT/CREAT UR: 2320.2 MG/G CREA (ref 0–200)
RBC # BLD AUTO: 2.85 10*6/MM3 (ref 4.14–5.8)
SODIUM BLD-SCNC: 140 MMOL/L (ref 136–145)
WBC NRBC COR # BLD: 6.3 10*3/MM3 (ref 3.4–10.8)

## 2019-12-09 PROCEDURE — 82962 GLUCOSE BLOOD TEST: CPT

## 2019-12-09 PROCEDURE — 25010000002 IRON SUCROSE PER 1 MG: Performed by: INTERNAL MEDICINE

## 2019-12-09 PROCEDURE — 80053 COMPREHEN METABOLIC PANEL: CPT | Performed by: INTERNAL MEDICINE

## 2019-12-09 PROCEDURE — 84100 ASSAY OF PHOSPHORUS: CPT | Performed by: INTERNAL MEDICINE

## 2019-12-09 PROCEDURE — 99233 SBSQ HOSP IP/OBS HIGH 50: CPT | Performed by: INTERNAL MEDICINE

## 2019-12-09 PROCEDURE — 99232 SBSQ HOSP IP/OBS MODERATE 35: CPT | Performed by: INTERNAL MEDICINE

## 2019-12-09 PROCEDURE — 83735 ASSAY OF MAGNESIUM: CPT | Performed by: INTERNAL MEDICINE

## 2019-12-09 PROCEDURE — 25010000002 FONDAPARINUX PER 0.5 MG: Performed by: INTERNAL MEDICINE

## 2019-12-09 PROCEDURE — 82330 ASSAY OF CALCIUM: CPT | Performed by: INTERNAL MEDICINE

## 2019-12-09 PROCEDURE — 63710000001 INSULIN LISPRO (HUMAN) PER 5 UNITS: Performed by: INTERNAL MEDICINE

## 2019-12-09 PROCEDURE — 85025 COMPLETE CBC W/AUTO DIFF WBC: CPT | Performed by: INTERNAL MEDICINE

## 2019-12-09 PROCEDURE — 25010000002 METHYLPREDNISOLONE PER 125 MG: Performed by: INTERNAL MEDICINE

## 2019-12-09 PROCEDURE — 99232 SBSQ HOSP IP/OBS MODERATE 35: CPT | Performed by: NURSE PRACTITIONER

## 2019-12-09 RX ORDER — CARVEDILOL 6.25 MG/1
6.25 TABLET ORAL 2 TIMES DAILY WITH MEALS
Status: DISCONTINUED | OUTPATIENT
Start: 2019-12-09 | End: 2019-12-09

## 2019-12-09 RX ORDER — CARVEDILOL 3.12 MG/1
3.12 TABLET ORAL 2 TIMES DAILY WITH MEALS
Status: DISCONTINUED | OUTPATIENT
Start: 2019-12-09 | End: 2019-12-12

## 2019-12-09 RX ORDER — PREDNISONE 20 MG/1
40 TABLET ORAL
Status: DISCONTINUED | OUTPATIENT
Start: 2019-12-10 | End: 2019-12-10

## 2019-12-09 RX ORDER — CARVEDILOL 3.12 MG/1
3.12 TABLET ORAL 2 TIMES DAILY WITH MEALS
Qty: 60 TABLET | Refills: 0 | Status: SHIPPED | OUTPATIENT
Start: 2019-12-09 | End: 2019-12-13 | Stop reason: HOSPADM

## 2019-12-09 RX ORDER — GABAPENTIN 300 MG/1
300 CAPSULE ORAL EVERY 8 HOURS SCHEDULED
Qty: 90 CAPSULE | Refills: 0 | Status: SHIPPED | OUTPATIENT
Start: 2019-12-09 | End: 2019-12-13 | Stop reason: HOSPADM

## 2019-12-09 RX ORDER — MIDODRINE HYDROCHLORIDE 5 MG/1
5 TABLET ORAL
Qty: 90 TABLET | Refills: 0 | Status: SHIPPED | OUTPATIENT
Start: 2019-12-09 | End: 2019-12-13 | Stop reason: HOSPADM

## 2019-12-09 RX ORDER — SODIUM BICARBONATE 650 MG/1
1300 TABLET ORAL 3 TIMES DAILY
Qty: 180 TABLET | Refills: 0 | Status: SHIPPED | OUTPATIENT
Start: 2019-12-09 | End: 2019-12-13 | Stop reason: HOSPADM

## 2019-12-09 RX ADMIN — CLONAZEPAM 0.5 MG: 0.5 TABLET ORAL at 08:19

## 2019-12-09 RX ADMIN — CALCIUM ACETATE 667 MG: 667 CAPSULE ORAL at 17:29

## 2019-12-09 RX ADMIN — MUPIROCIN: 20 OINTMENT TOPICAL at 08:24

## 2019-12-09 RX ADMIN — FONDAPARINUX SODIUM 7.5 MG: 7.5 INJECTION, SOLUTION SUBCUTANEOUS at 16:01

## 2019-12-09 RX ADMIN — SODIUM BICARBONATE 650 MG TABLET 1300 MG: at 16:02

## 2019-12-09 RX ADMIN — LACOSAMIDE 100 MG: 100 TABLET, FILM COATED ORAL at 20:07

## 2019-12-09 RX ADMIN — LEVETIRACETAM 500 MG: 500 TABLET ORAL at 08:19

## 2019-12-09 RX ADMIN — GABAPENTIN 300 MG: 300 CAPSULE ORAL at 16:01

## 2019-12-09 RX ADMIN — MUPIROCIN: 20 OINTMENT TOPICAL at 20:10

## 2019-12-09 RX ADMIN — SODIUM BICARBONATE 650 MG TABLET 1300 MG: at 20:06

## 2019-12-09 RX ADMIN — SODIUM BICARBONATE 650 MG TABLET 1300 MG: at 08:18

## 2019-12-09 RX ADMIN — MUPIROCIN: 20 OINTMENT TOPICAL at 16:02

## 2019-12-09 RX ADMIN — Medication 10 ML: at 08:24

## 2019-12-09 RX ADMIN — IRON SUCROSE 200 MG: 20 INJECTION, SOLUTION INTRAVENOUS at 16:01

## 2019-12-09 RX ADMIN — CALCIUM ACETATE 667 MG: 667 CAPSULE ORAL at 08:19

## 2019-12-09 RX ADMIN — LACOSAMIDE 100 MG: 100 TABLET, FILM COATED ORAL at 08:19

## 2019-12-09 RX ADMIN — SODIUM CHLORIDE 75 ML/HR: 900 INJECTION, SOLUTION INTRAVENOUS at 08:37

## 2019-12-09 RX ADMIN — GABAPENTIN 300 MG: 300 CAPSULE ORAL at 22:03

## 2019-12-09 RX ADMIN — FEBUXOSTAT 40 MG: 40 TABLET ORAL at 08:18

## 2019-12-09 RX ADMIN — CARVEDILOL 3.12 MG: 3.12 TABLET, FILM COATED ORAL at 17:30

## 2019-12-09 RX ADMIN — SODIUM CHLORIDE 75 ML/HR: 900 INJECTION, SOLUTION INTRAVENOUS at 17:28

## 2019-12-09 RX ADMIN — LEVETIRACETAM 500 MG: 500 TABLET ORAL at 20:07

## 2019-12-09 RX ADMIN — BUPRENORPHINE HYDROCHLORIDE 20 MG: 8 TABLET SUBLINGUAL at 08:19

## 2019-12-09 RX ADMIN — CARVEDILOL 3.12 MG: 3.12 TABLET, FILM COATED ORAL at 08:23

## 2019-12-09 RX ADMIN — Medication 10 ML: at 20:11

## 2019-12-09 RX ADMIN — CLONAZEPAM 0.5 MG: 0.5 TABLET ORAL at 20:07

## 2019-12-09 RX ADMIN — SERTRALINE HYDROCHLORIDE 50 MG: 50 TABLET ORAL at 08:19

## 2019-12-09 RX ADMIN — OXYCODONE HYDROCHLORIDE 10 MG: 5 TABLET ORAL at 08:23

## 2019-12-09 RX ADMIN — OYSTER SHELL CALCIUM WITH VITAMIN D 1 TABLET: 500; 200 TABLET, FILM COATED ORAL at 08:19

## 2019-12-09 RX ADMIN — Medication 27 MG: at 13:32

## 2019-12-09 RX ADMIN — FOLIC ACID 1 MG: 1 TABLET ORAL at 08:18

## 2019-12-09 RX ADMIN — INSULIN LISPRO 2 UNITS: 100 INJECTION, SOLUTION INTRAVENOUS; SUBCUTANEOUS at 17:29

## 2019-12-09 RX ADMIN — GABAPENTIN 300 MG: 300 CAPSULE ORAL at 05:17

## 2019-12-09 RX ADMIN — CYANOCOBALAMIN TAB 250 MCG 250 MCG: 250 TAB at 08:19

## 2019-12-09 RX ADMIN — OYSTER SHELL CALCIUM WITH VITAMIN D 1 TABLET: 500; 200 TABLET, FILM COATED ORAL at 20:07

## 2019-12-09 RX ADMIN — INSULIN LISPRO 3 UNITS: 100 INJECTION, SOLUTION INTRAVENOUS; SUBCUTANEOUS at 20:08

## 2019-12-09 NOTE — PROGRESS NOTES
[facility]   INPATIENT PROGRESS NOTE    Date of Admission: 12/6/2019  Length of Stay: 2  Primary Care Physician: Lori Hale NP    Subjective    Feels better  Subjective   CC: worsening renal function    HPI:    39 y.o. male  With complex medical history of CAPS, recurrent PE and DVT on arixtra, L foot amputation due to clot, s/p LLE bypass surgery, chronic hypoxia on home O2 24/7, liver cirrhosis with ascites, s/p paracentesis twice, SLE and CKD stage 3 who was sent in by  due to worsening renal function.Cr 3.9. Baseline 1.6-1.7.   He denies any change in his health but reports worsening ascites lately. Denies weight gain. Unable to eat much due to easy satiety due to ascites. But still eating and drinking home. Denies abdominal pain or fever  No JVD. Trace leg edema.      Labs reviewed. Chronic anemia steady. No sign of volume overload but will do CXR. No acidosis. Normal LFTs  Vs- low BP 90-100s on multiple BP regimen, no fever. No tachycardia     Pt was admitted for further management.     Review Of Systems:   Constitutional: Positive for activity change, appetite change and fatigue.   HENT: Negative.    Eyes: Negative.    Respiratory: Positive for shortness of breath.    Cardiovascular: Negative.    Gastrointestinal: Positive for abdominal distention.   Endocrine: Negative.    Genitourinary: Negative.    Musculoskeletal: Positive for arthralgias, gait problem and myalgias.   Skin: Negative.    Allergic/Immunologic: Negative.    Neurological: Positive for weakness.   Hematological: Negative.    Psychiatric/Behavioral: Positive for dysphoric mood.     Objective    Feels better  Cr trending down, non oliguric - UOP 1220 last 24hrs  Objective    Physical Exam:  Temp:  [97.9 °F (36.6 °C)-98.1 °F (36.7 °C)] 97.9 °F (36.6 °C)  Heart Rate:  [82-93] 93  Resp:  [16-18] 18  BP: (135-160)/(78-98) 146/80    Constitutional: He is oriented to person, place, and time. He appears well-developed. No distress.    HENT:   Head: Normocephalic and atraumatic.   Nose: Nose normal.   Mouth/Throat: No oropharyngeal exudate.   Eyes: Conjunctivae and EOM are normal. Pupils are equal, round, and reactive to light.   Neck: Normal range of motion. Neck supple.   Cardiovascular: Normal rate and regular rhythm.   Murmur heard.  Pulmonary/Chest: Effort normal and breath sounds normal. No respiratory distress. He has no wheezes. He has no rales. He exhibits no tenderness.   Abdominal: Soft. Bowel sounds are normal. He exhibits distension. There is no tenderness. There is no rebound.   Musculoskeletal: Normal range of motion.   Neurological: He is alert and oriented to person, place, and time. No cranial nerve deficit.   L foot MTA   Skin: Skin is warm and dry.   Very poor pedal pulse in both feet   Psychiatric: He has a normal mood and affect. His behavior is normal. Judgment and thought content normal    Results Review:    I have personally reviewed most recent cardiac tracings, lab results and radiology images and interpretations and agree with findings, most notably:  .      Results from last 7 days   Lab Units 12/09/19  0437 12/08/19  0334 12/07/19  0721   WBC 10*3/mm3 6.30 3.20* 5.10   HEMOGLOBIN g/dL 8.4* 8.4* 8.0*   HEMATOCRIT % 25.8* 25.6* 24.9*   PLATELETS 10*3/mm3 94* 106* 107*     Results from last 7 days   Lab Units 12/09/19  0458 12/08/19  1247 12/08/19  0334 12/07/19  0721 12/06/19  1540   SODIUM mmol/L 140  --  134* 133* 134*   POTASSIUM mmol/L 4.4 5.0 5.6* 5.0 5.0   CHLORIDE mmol/L 100  --  97* 98 94*   CO2 mmol/L 25.0  --  21.0* 22.0 26.0   BUN mg/dL 92*  --  93* 100* 96*   CREATININE mg/dL 3.19*  --  3.61* 3.93* 3.92*   GLUCOSE mg/dL 145*  --  203* 99 99   CALCIUM mg/dL 8.9  --  8.3* 8.3* 8.4*   ALK PHOS U/L 82  --  88  --  95   ALT (SGPT) U/L 10  --  <5  --  7   AST (SGOT) U/L 8  --  7  --  7     TSH (uIU/mL)   Date/Time Value   12/07/2019 0837 2.780     Microbiology Results Abnormal     Procedure Component Value -  Date/Time    Blood Culture - Blood, Arm, Left [[id number]] Collected:  12/08/19 1004    Lab Status:  Preliminary result Specimen:  Blood from Arm, Left Updated:  12/09/19 1111     Blood Culture No growth at 24 hours    Blood Culture - Blood, Wrist, Right [[id number]] Collected:  12/08/19 1005    Lab Status:  Preliminary result Specimen:  Blood from Wrist, Right Updated:  12/09/19 1111     Blood Culture No growth at 24 hours    Wound Culture - Wound, Leg, Left [[id number]] Collected:  12/08/19 1613    Lab Status:  Preliminary result Specimen:  Wound from Leg, Left Updated:  12/08/19 2046     Gram Stain Few (2+) WBCs observed - predominantly neutrophils      Occasional Gram positive cocci    Eosinophil Smear - Urine, Urine, Clean Catch [[id number]]  (Normal) Collected:  12/07/19 1824    Lab Status:  Final result Specimen:  Urine, Clean Catch Updated:  12/07/19 1925     Eosinophil Smear 0 % EOS/100 Cells         No radiology results for the last day         I have reviewed the medications.    Assessment/Plan   Assessment/Plan   Brief Hospital Course:      Active Hospital Problems:  * Acute renal failure superimposed on chronic kidney disease (CMS/HCC)- (present on admission)  Etiology; prerenal vs renal /possible lupus/MERYL flare up on CKD stage 3 per renal- on pulse dose steroid for 3 days solumedrol- changed to po prednisone  Recently completed zithromax for 5 days for URI sx  Non oliguric per pt  Urine lyte   Hypotensive- on multiple regimen home, hold amlodipine, clonidine but continue on Coreg at low dose  Renal US - no obstruction but mild cortical thinning, echogenic kidney  Fluid and albumin 25g x2    Nephrology on board  Cr trending down on fluid         Other ascites- (present on admission)  S/p 2 paracentesis - last one 8/2019  Getting worse gradually recently   May need paracentesis to avoid compression effect from worsening ascites- denies fever or abdominal pain.no leukocytosis. Not likely SBP.  a few dose of  albumin  Slightly worse in ascites since admission       Hyperkalemia- (present on admission)  Due to renal failure  Receive multiple regimen by renal  resolved    Chronic pain disorder- (present on admission)  On subotex due to addiction issue initially but due to pain control also since he had 2 major lung surgery  Followed by pain clinic- oxy was discontinued by some one else. Continues to c/o uncontrolled pain. But got only once per day since admission in addition to Subotex     Re-Consult to pain clinic   Watch for constipation for opiate related    Anemia, chronic disease- (present on admission)  Due to mild JUNG and ACD  Followed by Dr. Mak for CAPS   Iv and po iron and procrit per oncology  B12 supplement    Amputation of foot (CMS/HCC)  Due to arterial clot in LLE     Chronic respiratory failure with hypoxia (CMS/HCC)  Wearing O2, ryann desat at night  Etiology - due to lobectomy of lung or other  nonsmoker     SLE (systemic lupus erythematosus) (CMS/HCC)- (present on admission)  Not on specific med     Deep vein thrombosis (DVT) of lower extremity (CMS/HCC)- (present on admission)  On Arixtra   S/p IVC filter    Benign essential hypertension- (present on admission)  On clonidine, coreg and amlodipine and bumex home - held except low dose coreg  bp improved, on midodrine also - stop since BP trending up    Secondary hyperparathyroidism (CMS/HCC)- (present on admission)   Due to CKD    Venous stasis ulcer of left calf limited to breakdown of skin without varicose veins (CMS/HCC)- (present on admission)  Seen by ID  Zosyn discontinued  Wound cx pending  Applied bactroban cream by ID     PVD (peripheral vascular disease) (CMS/HCC)- (present on admission)  Got clot on LLE at the age 14, s/p bypass surgery     Hepatic cirrhosis (CMS/HCC)- (present on admission)  Etiology- unclear, possibly related to lupus per pt   Normal LFTs    CAPS (catastrophic antiphospholipid syndrome) (CMS/HCC)- (present on  admission)  Multiple recurrent PE and DVT -s/p lobectomy, both lung due to clots    S/p plasmapheresis, iv IG, high dose steroid, this year   On arixtra- initially coumadin and lovenox  Dr. Mak - hematologist  Not following with rheumatology          DVT prophylaxis: arixtra  Discharge Planning: I expect patient to be discharged to home in a few days.    Pretty Sevilla MD, 12/08/19 7:10 AM

## 2019-12-09 NOTE — PROGRESS NOTES
Hematology/Oncology Inpatient Progress Note    PATIENT NAME: Lorenzo Crockett  : 1980  MRN: [id number]    CHIEF COMPLAINT: Pancytopenia    HISTORY OF PRESENT ILLNESS:    39 y.o. male admitted through the ED 2019 after he was instructed to go by the cancer center with abnormal elevation of potassium (5.5) on outpatient labs 2019.  The patient had been instructed to go to the ED 2019.  He reported worsening abdominal distention.  Repeat potassium level was normal (5.0) but creatinine level was rising (3.92) from baseline.  Nephrology was consulted and the patient has known CKD secondary to SLE/MERYL.  LFTs were okay.  CBC showed WBC 6.6, hemoglobin 8.6, MCV 90.7, and platelets 118,000.  Chest x-ray showed no change in alveolar and airspace opacities bilaterally as compared with 2019.  Renal ultrasound was negative for hydronephrosis and showed markedly echogenic kidneys with mild cortical thickening and moderate ascites. On 19 he was started on daily Solu-Medrol. On 2019 CBC showed WBC 3.2, hemoglobin 8.4, and platelets 106,000.      19  Hematology/Oncology was consulted as the patient is known to our service for thrombocytopenia and thrombophilia.  He was initially evaluated by Dr. Mak in .  He was anemic at that time and diagnosed with JUNG.  Thrombocytopenia was felt due to medication (risperidone) and concern for bone marrow suppression secondary to lupus treatment.  He was also noted at that time to have bulky adenopathy on CT scans with PET ordered but not completed due to patient noncompliance.  He was lost to follow-up and seen again in 2017 for thrombocytopenia during hospital admission.  Thrombocytopenia stabilized and no etiology was detected.  He had a history of positive lupus anticoagulant and was continued on Arixtra and had undergone an IVC filter placement at some point.  Anemia again was felt due to JUNG and ACD.  He was lost to follow-up  again until consulted during an additional hospitalization in 2019.  He had been evaluated by rheumatology and diagnosed with catastrophic antiphospholipid syndrome treated with plasma exchange x5 and high-dose steroids along with a brief period of dialysis.  On 3/11/2019 hematologic parameters including haptoglobin, normal at 64.  .  Ferritin 32.  Complement C3, C4 was low.  Serum iron was low at 27.  .  Viral hepatitis panel was nonreactive.  B12 was more than 1,500.  Folate was more than 24.  He received IV IgG 40 g on 4/4, 4/5, and 4/8/2019.  He received Injectafer x2 doses with last given 4/15/2019.   He was last seen as an outpatient on 12/4/2019 where CBC revealed WBC 6.49, hemoglobin 8.4, and platelets 89,000.  He complained of chest pain and oxygen saturation falling into the 60s when off home oxygen and was encouraged to go to the ED.  For his thrombophilia he was noted to have had an IVC filter placed with most recent anticoagulation of Arixtra 7.5 mg subcu daily. Thrombocytopenia was felt likely secondary to ITP and underlying autoimmune disease with baseline platelet count 100,000. For his multifactorial anemia including JUNG he was encouraged to see GI and plans were to continue as needed Injectafer with the addition of Procrit if needed secondary to CKD.  Vitamin B12 deficiency was noted in August 2019.  He was on oral replacement and this was changed to injectable.  On 12/4/2019 iron studies showed iron 33 (), and iron saturation 13% (20-50), TIBC 252 (298-536), and ferritin 279 ().     PCP: Lori Hale NP    Subjective Complains about pain mostly in the back    ROS:  Review of Systems   Constitutional: Negative for chills and fever.   HENT: Negative for ear pain, mouth sores, nosebleeds and sore throat.    Eyes: Negative for photophobia and visual disturbance.   Respiratory: Negative for wheezing and stridor.    Cardiovascular: Negative for chest pain and palpitations.    Gastrointestinal: Negative for abdominal pain, diarrhea, nausea and vomiting.   Endocrine: Negative for cold intolerance and heat intolerance.   Genitourinary: Negative for dysuria and hematuria.   Musculoskeletal: Negative for joint swelling and neck stiffness.   Skin: Negative for color change and rash.   Neurological: Negative for seizures and syncope.   Hematological: Negative for adenopathy.        No obvious bleeding   Psychiatric/Behavioral: Negative for agitation, confusion and hallucinations.        MEDICATIONS:    Scheduled Meds:      buprenorphine 20 mg Sublingual Daily   calcium acetate 667 mg Oral TID With Meals   calcium gluconate 1 g Intravenous Once   calcium-vitamin D 1 tablet Oral BID   carvedilol 3.125 mg Oral BID With Meals   clonazePAM 0.5 mg Oral BID   epoetin candy/candy-epbx 20,000 Units Subcutaneous Q14 Days   febuxostat 40 mg Oral Daily   folic acid 1 mg Oral Daily   fondaparinux 7.5 mg Subcutaneous Daily   gabapentin 300 mg Oral Q8H   insulin lispro 0-7 Units Subcutaneous 4x Daily With Meals & Nightly   iron sucrose 200 mg Intravenous Q24H   lacosamide 100 mg Oral BID   levETIRAcetam 500 mg Oral Q12H   methylPREDNISolone sodium succinate 250 mg Intravenous Q24H   midodrine 5 mg Oral TID AC   mupirocin  Topical Q12H   sertraline 50 mg Oral Daily   sodium bicarbonate 1,300 mg Oral TID   sodium chloride 10 mL Intravenous Q12H   vitamin B-12 250 mcg Oral Daily      Continuous Infusions:      sodium chloride 75 mL/hr Last Rate: 75 mL/hr (12/09/19 0837)      PRN Meds:  •  baclofen  •  bisacodyl  •  bisacodyl  •  calcium carbonate  •  dextrose  •  dextrose  •  famotidine  •  glucagon (human recombinant)  •  insulin lispro **AND** insulin lispro  •  ketamine (KETALAR) infusion **AND** Ketamine Vital Signs & Assessment  •  melatonin  •  nitroglycerin  •  ondansetron **OR** ondansetron  •  oxyCODONE  •  prochlorperazine  •  QUEtiapine  •  rOPINIRole  •  [COMPLETED] Insert peripheral IV **AND**  "sodium chloride  •  sodium chloride     ALLERGIES:    Allergies   Allergen Reactions   • Tramadol Other (See Comments)     seizure   • Melon Rash       Objective    VITALS:   /98   Pulse 82   Temp 98.1 °F (36.7 °C) (Oral)   Resp 16   Ht 185.4 cm (73\")   Wt 90.7 kg (199 lb 15.3 oz)   SpO2 99%   BMI 26.38 kg/m²     PHYSICAL EXAM:  Physical Exam   Constitutional: He is oriented to person, place, and time. No distress.   Frail   HENT:   Head: Normocephalic and atraumatic.   Eyes: Conjunctivae and EOM are normal. Right eye exhibits no discharge. Left eye exhibits no discharge. No scleral icterus.   Neck: Normal range of motion. Neck supple. No thyromegaly present.   Cardiovascular: Normal rate, regular rhythm and normal heart sounds. Exam reveals no gallop and no friction rub.   Pulmonary/Chest: Effort normal. No stridor. No respiratory distress. He has no wheezes.   Diminished breath sounds bilaterally   Abdominal: Soft. Bowel sounds are normal. He exhibits no mass. There is no tenderness. There is no rebound and no guarding.   Musculoskeletal: Normal range of motion. He exhibits deformity ( History of LLE partial amputation). He exhibits no tenderness.   Lymphadenopathy:     He has no cervical adenopathy.   Neurological: He is alert and oriented to person, place, and time. He exhibits normal muscle tone.   Skin: Skin is warm. No rash noted. He is not diaphoretic. No erythema.   Psychiatric: He has a normal mood and affect. His behavior is normal.   Nursing note and vitals reviewed.        RECENT LABS:  Lab Results (last 24 hours)     Procedure Component Value Units Date/Time    POC Glucose Once [[id number]]  (Abnormal) Collected:  12/09/19 0703    Specimen:  Blood Updated:  12/09/19 0704     Glucose 124 mg/dL      Comment: Serial Number: [id number]:  [id number]       Comprehensive Metabolic Panel [[id number]]  (Abnormal) Collected:  12/09/19 0458    Specimen:  Blood Updated:  12/09/19 0601     Glucose " 145 mg/dL      BUN 92 mg/dL      Creatinine 3.19 mg/dL      Sodium 140 mmol/L      Potassium 4.4 mmol/L      Chloride 100 mmol/L      CO2 25.0 mmol/L      Calcium 8.9 mg/dL      Total Protein 6.7 g/dL      Albumin 3.70 g/dL      ALT (SGPT) 10 U/L      AST (SGOT) 8 U/L      Alkaline Phosphatase 82 U/L      Total Bilirubin <0.2 mg/dL      eGFR Non African Amer 22 mL/min/1.73      Globulin 3.0 gm/dL      A/G Ratio 1.2 g/dL      BUN/Creatinine Ratio 28.8     Anion Gap 15.0 mmol/L     Narrative:       GFR Normal >60  Chronic Kidney Disease <60  Kidney Failure <15      Magnesium [[id number]]  (Abnormal) Collected:  12/09/19 0458    Specimen:  Blood Updated:  12/09/19 0601     Magnesium 2.8 mg/dL     Phosphorus [[id number]]  (Abnormal) Collected:  12/09/19 0458    Specimen:  Blood Updated:  12/09/19 0601     Phosphorus 6.2 mg/dL     Calcium, Ionized [[id number]]  (Normal) Collected:  12/09/19 0458    Specimen:  Blood Updated:  12/09/19 0549     Ionized Calcium 1.21 mmol/L     CBC & Differential [[id number]] Collected:  12/09/19 0437    Specimen:  Blood Updated:  12/09/19 0521    Narrative:       The following orders were created for panel order CBC & Differential.  Procedure                               Abnormality         Status                     ---------                               -----------         ------                     CBC Auto Differential[[id number]]        Abnormal            Final result                 Please view results for these tests on the individual orders.    CBC Auto Differential [[id number]]  (Abnormal) Collected:  12/09/19 0437    Specimen:  Blood Updated:  12/09/19 0521     WBC 6.30 10*3/mm3      RBC 2.85 10*6/mm3      Hemoglobin 8.4 g/dL      Hematocrit 25.8 %      MCV 90.9 fL      MCH 29.4 pg      MCHC 32.4 g/dL      RDW 16.3 %      RDW-SD 52.5 fl      MPV 7.8 fL      Platelets 94 10*3/mm3      Neutrophil % 92.7 %      Lymphocyte % 4.1 %      Monocyte % 2.9 %      Eosinophil % 0.0 %       Basophil % 0.3 %      Neutrophils, Absolute 5.80 10*3/mm3      Lymphocytes, Absolute 0.30 10*3/mm3      Monocytes, Absolute 0.20 10*3/mm3      Eosinophils, Absolute 0.00 10*3/mm3      Basophils, Absolute 0.00 10*3/mm3      nRBC 0.1 /100 WBC     Wound Culture - Wound, Leg, Left [[id number]] Collected:  12/08/19 1613    Specimen:  Wound from Leg, Left Updated:  12/08/19 2046     Gram Stain Few (2+) WBCs observed - predominantly neutrophils      Occasional Gram positive cocci    POC Glucose Once [[id number]]  (Abnormal) Collected:  12/08/19 1914    Specimen:  Blood Updated:  12/08/19 1916     Glucose 170 mg/dL      Comment: Serial Number: [id number]:  [id number]       POC Glucose Once [[id number]]  (Abnormal) Collected:  12/08/19 1700    Specimen:  Blood Updated:  12/08/19 1704     Glucose 157 mg/dL      Comment: Serial Number: [id number]:  [id number]       Potassium [[id number]]  (Normal) Collected:  12/08/19 1247    Specimen:  Blood Updated:  12/08/19 1314     Potassium 5.0 mmol/L     Blood Culture - Blood, Arm, Left [[id number]] Collected:  12/08/19 1004    Specimen:  Blood from Arm, Left Updated:  12/08/19 1012    Blood Culture - Blood, Wrist, Right [[id number]] Collected:  12/08/19 1005    Specimen:  Blood from Wrist, Right Updated:  12/08/19 1012    POC Glucose Once [[id number]]  (Abnormal) Collected:  12/08/19 0959    Specimen:  Blood Updated:  12/08/19 1000     Glucose 215 mg/dL      Comment: Serial Number: [id number]:  [id number]             PENDING RESULTS: n/a    IMAGING REVIEWED:  No radiology results for the last day    I have reviewed the remained stable at 94,000 labs, imaging, reports, and other clinician documentation.    Assessment/Plan   ASSESSMENT:  1. Pancytopenia/chronic thrombocytopenia (possible ITP)/JUNG and ACD secondary to CKD/vitamin B12 deficiency- on Venofer and Procrit as inpatient.  On oral B12- injection x1 ordered.  WBC with mild drop post admission possibly related to  antibiotics.  Platelets stable and improved from recent outpatient value.  2. Thrombophilia/antiphospholipid syndrome/history of PE- status post IVC filter.  Currently on Arixtra.  3. TABBY on CKD stage III/proteinuria/electrolyte imbalances-Dr. Goodwin managing with TABBY felt secondary to prerenal state and occasional NSAID use and concern for recurrent minimal-change disease. On solumedrol 250mg IV daily.  Patient declining renal biopsy.  4. History of minimal-change disease/SLE- chronic SLE.  MCV treated in past with plasma exchange and high-dose steroids.   5. CUMMINS/ascites- last paracentesis August 2019.  Albumin low.  LFTs ok. RN reporting disorientation/confusion through night.  6. Hypoxia/chronic respiratory failure-oxygen dependent. On Zosyn per primary team.    PLAN:  1. Continue solumedrol.   2. Continue Arixtra, Venofer and Procrit/Retacrit.  3. Daily CBC.  4. Continue Vitamin B12 1000 mcg IM monthly as outpatient.        Note updated by MARQUISE Santizo.  Patient seen and examined by Dr. Mak.  Electronically signed by YURIDIA Zabala, 12/09/19, 9:56 AM.    I have personally performed a face-to-face diagnostic evaluation on this patient.  I have reviewed and agree with the care plan.  Notes reviewed and edited.  Discussed with nurse practitioner.  Platelets remains stable  I discussed the patients findings and my recommendations with patient    Oma Mak MD  12/09/19  9:50 AM

## 2019-12-09 NOTE — PLAN OF CARE
Problem: Patient Care Overview  Goal: Plan of Care Review  12/9/2019 0428 by Naeem Sharp LPN  Outcome: Ongoing (interventions implemented as appropriate)  Flowsheets  Taken 12/9/2019 0428  Progress: no change  Taken 12/8/2019 6347  Plan of Care Reviewed With: patient  Note:   Patient slept through the night with no complaints of pain

## 2019-12-09 NOTE — PROGRESS NOTES
Infectious Diseases Progress Note      LOS: 2 days   Patient Care Team:  Lori Hale NP as PCP - General (Family Medicine)  Lori Hale NP as PCP - Claims Attributed  Lori Hale NP as PCP - Family Medicine  Oma Mak MD as Consulting Physician (Hematology and Oncology)  Pito Goodwin MD as Consulting Physician (Nephrology)    Chief Complaint:  Fatigue     Subjective         Review of Systems:   Review of Systems   Constitutional: Positive for fatigue.   HENT: Negative.    Respiratory: Negative.    Cardiovascular: Negative.    Gastrointestinal: Positive for abdominal distention.   Genitourinary: Negative.    Musculoskeletal: Negative.    Skin: Positive for wound.   Neurological: Negative.    Psychiatric/Behavioral: Negative.         Objective     Vital Signs  Temp:  [97.8 °F (36.6 °C)-98.1 °F (36.7 °C)] 98.1 °F (36.7 °C)  Heart Rate:  [72-93] 82  Resp:  [16-18] 16  BP: (135-160)/(69-98) 160/98    Physical Exam:  Physical Exam   Constitutional: He is oriented to person, place, and time. He appears well-developed and well-nourished.   HENT:   Head: Normocephalic and atraumatic.   Eyes: Pupils are equal, round, and reactive to light. Conjunctivae and EOM are normal.   Neck: Neck supple.   Cardiovascular: Normal rate, regular rhythm and normal heart sounds.   Pulmonary/Chest: Effort normal and breath sounds normal.   Abdominal: Soft. Bowel sounds are normal. He exhibits distension.   Musculoskeletal: Normal range of motion.   Neurological: He is alert and oriented to person, place, and time.   Skin: Skin is warm and dry.   Chronic venous stasis of both lower extremities.  On the left anterior shin patient has superficial wound with minimal drainage with no significant surrounding erythema.   Psychiatric: He has a normal mood and affect.   Vitals reviewed.       Results Review:    I have reviewed all clinical data, test, lab, and imaging results.     Radiology  No Radiology Exams  Resulted Within Past 24 Hours    Cardiology    Laboratory  Results from last 7 days   Lab Units 12/09/19  0437   WBC 10*3/mm3 6.30   HEMOGLOBIN g/dL 8.4*   HEMATOCRIT % 25.8*   PLATELETS 10*3/mm3 94*     Results from last 7 days   Lab Units 12/09/19  0458   SODIUM mmol/L 140   POTASSIUM mmol/L 4.4   CHLORIDE mmol/L 100   CO2 mmol/L 25.0   BUN mg/dL 92*   CREATININE mg/dL 3.19*   GLUCOSE mg/dL 145*   CALCIUM mg/dL 8.9     Results from last 7 days   Lab Units 12/09/19  0458   SODIUM mmol/L 140   POTASSIUM mmol/L 4.4   CHLORIDE mmol/L 100   CO2 mmol/L 25.0   BUN mg/dL 92*   CREATININE mg/dL 3.19*   GLUCOSE mg/dL 145*   CALCIUM mg/dL 8.9     Results from last 7 days   Lab Units 12/07/19  0721   CK TOTAL U/L 12*             Microbiology   Microbiology Results (last 10 days)     Procedure Component Value - Date/Time    Wound Culture - Wound, Leg, Left [[id number]] Collected:  12/08/19 1613    Lab Status:  Preliminary result Specimen:  Wound from Leg, Left Updated:  12/08/19 2046     Gram Stain Few (2+) WBCs observed - predominantly neutrophils      Occasional Gram positive cocci    Eosinophil Smear - Urine, Urine, Clean Catch [[id number]]  (Normal) Collected:  12/07/19 1824    Lab Status:  Final result Specimen:  Urine, Clean Catch Updated:  12/07/19 1925     Eosinophil Smear 0 % EOS/100 Cells           Medication Review:       Schedule Meds    buprenorphine 20 mg Sublingual Daily   calcium acetate 667 mg Oral TID With Meals   calcium gluconate 1 g Intravenous Once   calcium-vitamin D 1 tablet Oral BID   carvedilol 3.125 mg Oral BID With Meals   clonazePAM 0.5 mg Oral BID   epoetin candy/candy-epbx 20,000 Units Subcutaneous Q14 Days   febuxostat 40 mg Oral Daily   folic acid 1 mg Oral Daily   fondaparinux 7.5 mg Subcutaneous Daily   gabapentin 300 mg Oral Q8H   insulin lispro 0-7 Units Subcutaneous 4x Daily With Meals & Nightly   iron sucrose 200 mg Intravenous Q24H   lacosamide 100 mg Oral BID   levETIRAcetam 500 mg Oral  Q12H   methylPREDNISolone sodium succinate 250 mg Intravenous Q24H   midodrine 5 mg Oral TID AC   mupirocin  Topical Q12H   sertraline 50 mg Oral Daily   sodium bicarbonate 1,300 mg Oral TID   sodium chloride 10 mL Intravenous Q12H   vitamin B-12 250 mcg Oral Daily       Infusion Meds    sodium chloride 75 mL/hr Last Rate: 75 mL/hr (12/09/19 0837)       PRN Meds  •  baclofen  •  bisacodyl  •  bisacodyl  •  calcium carbonate  •  dextrose  •  dextrose  •  famotidine  •  glucagon (human recombinant)  •  insulin lispro **AND** insulin lispro  •  ketamine (KETALAR) infusion **AND** Ketamine Vital Signs & Assessment  •  melatonin  •  nitroglycerin  •  ondansetron **OR** ondansetron  •  oxyCODONE  •  prochlorperazine  •  QUEtiapine  •  rOPINIRole  •  [COMPLETED] Insert peripheral IV **AND** sodium chloride  •  sodium chloride        Assessment/Plan       Antimicrobial Therapy   1.       Day  2.      Day  3.      Day  4.      Day  5.      Day      Assessment     Left lower extremity venous stasis wound with possible mild infection if any.     Chronic kidney disease with acute kidney failure on the top of that.  Most likely secondary to lupus nephritis     History of lupus anticoagulant.  Patient has thrombosis of the lower extremities in the past and has left foot transmetatarsal amputation     Jimenez     Plan    Bactroban ointment twice daily on the left leg wound  Wound culture pending  Wound care  Continue supportive care  Abryant Lance, LESLYE  12/09/19  10:03 AM

## 2019-12-09 NOTE — PROGRESS NOTES
NEPHROLOGY PROGRESS NOTE------[facility]    [facility]  [phone]  Jorge Luis Goodwin MD      Patient Care Team:  Lori Hale NP as PCP - General (Family Medicine)  Lori Hale NP as PCP - Claims Attributed  Lori Hale NP as PCP - Family Medicine  Oma Mak MD as Consulting Physician (Hematology and Oncology)  Pito Goodwin MD as Consulting Physician (Nephrology)      Provider:  Jorge Luis Goodwin MD  Patient Name: Lorenzo Crockett  :  1980    SUBJECTIVE:    Generalized weakness. Negative for CP/SOA. Lungs clear. No edema. Generalized achiness.     Medication:    buprenorphine 20 mg Sublingual Daily   calcium acetate 667 mg Oral TID With Meals   calcium gluconate 1 g Intravenous Once   calcium-vitamin D 1 tablet Oral BID   carvedilol 3.125 mg Oral BID With Meals   clonazePAM 0.5 mg Oral BID   epoetin candy/candy-epbx 20,000 Units Subcutaneous Q14 Days   febuxostat 40 mg Oral Daily   folic acid 1 mg Oral Daily   fondaparinux 7.5 mg Subcutaneous Daily   gabapentin 300 mg Oral Q8H   insulin lispro 0-7 Units Subcutaneous 4x Daily With Meals & Nightly   iron sucrose 200 mg Intravenous Q24H   lacosamide 100 mg Oral BID   levETIRAcetam 500 mg Oral Q12H   methylPREDNISolone sodium succinate 250 mg Intravenous Q24H   midodrine 5 mg Oral TID AC   mupirocin  Topical Q12H   sertraline 50 mg Oral Daily   sodium bicarbonate 1,300 mg Oral TID   sodium chloride 10 mL Intravenous Q12H   vitamin B-12 250 mcg Oral Daily       sodium chloride 75 mL/hr Last Rate: 75 mL/hr (19 0837)       OBJECTIVE    Vital Sign Min/Max for last 24 hours  Temp  Min: 97.8 °F (36.6 °C)  Max: 98.1 °F (36.7 °C)   BP  Min: 135/81  Max: 160/98   Pulse  Min: 72  Max: 93   Resp  Min: 16  Max: 18   SpO2  Min: 92 %  Max: 99 %   No data recorded   Weight  Min: 90.7 kg (199 lb 15.3 oz)  Max: 90.7 kg (199 lb 15.3 oz)     Flowsheet Rows      First Filed Value  "  Admission Height  185.4 cm (73\") Documented at 12/06/2019 1422   Admission Weight  84.5 kg (186 lb 4.6 oz) Documented at 12/06/2019 1422          I/O this shift:  In: 909 [I.V.:909]  Out: -   I/O last 3 completed shifts:  In: 4276 [P.O.:2260; I.V.:2016]  Out: 2245 [Urine:2245]    Physical Exam:  General Appearance: alert, appears stated age and cooperative  Head: normocephalic, without obvious abnormality and atraumatic  Eyes: conjunctivae and sclerae normal and no icterus  Neck: supple and no JVD  Lungs: DECREASED BS BIBASILAR WITH FEW SCATTERED RHONCHI  Heart: regular rhythm & normal rate and normal S1, S2 +DEDE  Chest: Wall no abnormalities observed  Abdomen: normal bowel sounds and soft non-tender  Extremities: moves extremities well, no edema, no cyanosis and no redness  Skin: +LE WOUND  Neurologic: Alert, and oriented. No focal deficits    Labs:    WBC WBC   Date Value Ref Range Status   12/09/2019 6.30 3.40 - 10.80 10*3/mm3 Final   12/08/2019 3.20 (L) 3.40 - 10.80 10*3/mm3 Final   12/07/2019 5.10 3.40 - 10.80 10*3/mm3 Final   12/06/2019 6.60 3.40 - 10.80 10*3/mm3 Final      HGB Hemoglobin   Date Value Ref Range Status   12/09/2019 8.4 (L) 13.0 - 17.7 g/dL Final   12/08/2019 8.4 (L) 13.0 - 17.7 g/dL Final   12/07/2019 8.0 (L) 13.0 - 17.7 g/dL Final   12/06/2019 8.6 (L) 13.0 - 17.7 g/dL Final      HCT Hematocrit   Date Value Ref Range Status   12/09/2019 25.8 (L) 37.5 - 51.0 % Final   12/08/2019 25.6 (L) 37.5 - 51.0 % Final   12/07/2019 24.9 (L) 37.5 - 51.0 % Final   12/06/2019 26.6 (L) 37.5 - 51.0 % Final      Platlets No results found for: LABPLAT   MCV MCV   Date Value Ref Range Status   12/09/2019 90.9 79.0 - 97.0 fL Final   12/08/2019 87.4 79.0 - 97.0 fL Final     Comment:     Result checked    12/07/2019 92.0 79.0 - 97.0 fL Final   12/06/2019 90.7 79.0 - 97.0 fL Final          Sodium Sodium   Date Value Ref Range Status   12/09/2019 140 136 - 145 mmol/L Final   12/08/2019 134 (L) 136 - 145 mmol/L Final "   12/07/2019 133 (L) 136 - 145 mmol/L Final   12/06/2019 134 (L) 136 - 145 mmol/L Final      Potassium Potassium   Date Value Ref Range Status   12/09/2019 4.4 3.5 - 5.2 mmol/L Final   12/08/2019 5.0 3.5 - 5.2 mmol/L Final   12/08/2019 5.6 (H) 3.5 - 5.2 mmol/L Final   12/07/2019 5.0 3.5 - 5.2 mmol/L Final   12/06/2019 5.0 3.5 - 5.2 mmol/L Final      Chloride Chloride   Date Value Ref Range Status   12/09/2019 100 98 - 107 mmol/L Final   12/08/2019 97 (L) 98 - 107 mmol/L Final   12/07/2019 98 98 - 107 mmol/L Final   12/06/2019 94 (L) 98 - 107 mmol/L Final      CO2 CO2   Date Value Ref Range Status   12/09/2019 25.0 22.0 - 29.0 mmol/L Final   12/08/2019 21.0 (L) 22.0 - 29.0 mmol/L Final   12/07/2019 22.0 22.0 - 29.0 mmol/L Final   12/06/2019 26.0 22.0 - 29.0 mmol/L Final      BUN BUN   Date Value Ref Range Status   12/09/2019 92 (H) 6 - 20 mg/dL Final   12/08/2019 93 (H) 6 - 20 mg/dL Final   12/07/2019 100 (H) 6 - 20 mg/dL Final   12/06/2019 96 (H) 6 - 20 mg/dL Final      Creatinine Creatinine   Date Value Ref Range Status   12/09/2019 3.19 (H) 0.76 - 1.27 mg/dL Final   12/08/2019 3.61 (H) 0.76 - 1.27 mg/dL Final   12/07/2019 3.93 (H) 0.76 - 1.27 mg/dL Final   12/06/2019 3.92 (H) 0.76 - 1.27 mg/dL Final      Calcium Calcium   Date Value Ref Range Status   12/09/2019 8.9 8.6 - 10.5 mg/dL Final   12/08/2019 8.3 (L) 8.6 - 10.5 mg/dL Final   12/07/2019 8.3 (L) 8.6 - 10.5 mg/dL Final   12/06/2019 8.4 (L) 8.6 - 10.5 mg/dL Final      PO4 No components found for: PO4   Albumin Albumin   Date Value Ref Range Status   12/09/2019 3.70 3.50 - 5.20 g/dL Final   12/08/2019 3.80 3.50 - 5.20 g/dL Final   12/06/2019 3.20 (L) 3.50 - 5.20 g/dL Final      Magnesium Magnesium   Date Value Ref Range Status   12/09/2019 2.8 (H) 1.6 - 2.6 mg/dL Final   12/08/2019 3.0 (H) 1.6 - 2.6 mg/dL Final      Uric Acid No components found for: URIC ACID     Imaging Results (Last 72 Hours)     Procedure Component Value Units Date/Time    US Renal  Bilateral [[id number]] Collected:  12/06/19 2317     Updated:  12/06/19 2328    Narrative:       DATE OF EXAM:  12/6/2019 8:43 PM     PROCEDURE:  US RENAL BILATERAL-     INDICATIONS:  acute renal failure; N17.9-Acute kidney failure, unspecified;  N18.9-Chronic kidney disease, unspecified; D64.9-Anemia, unspecified     COMPARISON:  Renal ultrasound dated 08/21/2019     TECHNIQUE:   Grayscale and color Doppler ultrasound evaluation of the kidneys and  urinary bladder was performed.        FINDINGS:  Moderate amount of ascites is present. Kidneys are markedly echogenic  with mild cortical thinning. No hydronephrosis. Kidneys measure  approximately 11.3 and 9.3 cm in [id number] length on the right and  left respectively. The Bladder is empty.        Impression:       Markedly echogenic kidneys with mild cortical thinning. No  hydronephrosis.  Moderate amount of ascites.     Electronically Signed By-Yogesh Salmeron DO. On:12/6/2019 11:20 PM  This report was finalized on [id number] by  Yogesh Salmeron DO..    XR Chest 1 View [[id number]] Collected:  12/06/19 2014     Updated:  12/06/19 2020    Narrative:       XR CHEST 1 VW-     Date of Exam: 12/6/2019 6:55 PM     Indication: sob; N17.9-Acute kidney failure, unspecified; N18.9-Chronic  kidney disease, unspecified; D64.9-Anemia, unspecified  39-year-old  history of lupus, on home oxygen, anemia     Comparison: 08/21/2019, 03/29/2019     Technique: 1 view(s) of the chest were obtained.     FINDINGS: There is still volume loss in the right chest which is  likely postsurgical. There are also sternotomy wires and postsurgical  changes in the mediastinum. Alveolar and interstitial opacities  bilaterally are present similar in appearance and distribution to  8/21/2019 and 3/29/2019. No pneumothorax or definite pleural effusion.  There is calcification along the right diaphragm which is unchanged.  Trachea is midline. No definite pleural effusions.       Impression:       There  are postsurgical changes in the right chest as well as sternotomy  wires and surgical clips in the mediastinum. No significant change in  alveolar and airspace opacities bilaterally which may be chronic or due  to recurrent infection.        Electronically Signed By-Yogesh Salmeron DO. On:12/6/2019 8:18 PM  This report was finalized on [id number] by  Yogesh Salmeron DO..          Results for orders placed during the hospital encounter of 12/06/19   XR Chest 1 View    Narrative XR CHEST 1 VW-     Date of Exam: 12/6/2019 6:55 PM     Indication: sob; N17.9-Acute kidney failure, unspecified; N18.9-Chronic  kidney disease, unspecified; D64.9-Anemia, unspecified  39-year-old  history of lupus, on home oxygen, anemia     Comparison: 08/21/2019, 03/29/2019     Technique: 1 view(s) of the chest were obtained.     FINDINGS: There is still volume loss in the right chest which is  likely postsurgical. There are also sternotomy wires and postsurgical  changes in the mediastinum. Alveolar and interstitial opacities  bilaterally are present similar in appearance and distribution to  8/21/2019 and 3/29/2019. No pneumothorax or definite pleural effusion.  There is calcification along the right diaphragm which is unchanged.  Trachea is midline. No definite pleural effusions.       Impression There are postsurgical changes in the right chest as well as sternotomy  wires and surgical clips in the mediastinum. No significant change in  alveolar and airspace opacities bilaterally which may be chronic or due  to recurrent infection.        Electronically Signed By-Yogesh Salmeron DO. On:12/6/2019 8:18 PM  This report was finalized on [id number] by  Yogesh Salmeron DO..              ASSESSMENT / PLAN      Acute renal failure superimposed on chronic kidney disease (CMS/HCC)    CAPS (catastrophic antiphospholipid syndrome) (CMS/HCC)    Benign essential hypertension    Chronic pain disorder    Deep vein thrombosis (DVT) of lower extremity  (CMS/HCC)    Hepatic cirrhosis (CMS/HCC)    PVD (peripheral vascular disease) (CMS/HCC)    SLE (systemic lupus erythematosus) (CMS/HCC)    Other ascites    Anemia, chronic disease    Hyperkalemia    Secondary hyperparathyroidism (CMS/HCC)      1. ARF/TABBY/CRF/CKD STG 3---- Nonoliguric. Creatinine down a little. +ARF/TABBY on top of known CRF/CKD stage 3, with a baseline serum creatinine of around 1.6-1.7. CRF/CKD STG 3 is secondary to HTN NS/SLE/history of biopsy proven MERYL which may be recurrent. Renal US ok. +ARF/TABBY appears to be secondary to prerenal state and occasional NSAID use and likely recurrent MERYL. No obstructive uropathy on US. Cautiously hydrate. No NSAIDs. No IV dye unless urgently needed.  Dose medsfor CrCL<10 cc/min. Follow for dialysis need. No obvious uremia at present. Patient refuses renal biopsy and would be high risk given thrombocytopenia anyway     2. PERSISTENT PROTEINURIA---Quantify. 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 Today is day # 3 of IV Solumedrol. Refuses biopsy and not likely to be beneficial at present.     3. HISTORY OF LUPUS NEPHRITIS--- On day # 3 of pulse dose steroids. Start po Pred tomorrow AM     4. HISTORY OF MINIMAL CHANGE DISEASE--- quantify proteinuria.  Steroids for likely relapse     5. HTN WITH CKD--- BP low but improved. Avoid hypotension.     6. HYPOALBUMINEMIA--- secondary to proteinuria and poor PO intake. S/P IV Albumin to temporize     7. ANEMIA OF CKD--- Venofer for JUGN. Follow for PRBC need. EPO started     8. HYPONATREMIA-- Resolved. Hypotonic and clinically hypovolemic. Giving NS and follow     9. HYPOCALCEMIA--- Replaced IV.      10. HYPERKALEMIA--- Resolved. Medically treated with Rx with Kayexalate, bicarb, IV Humulin R (without D50 b/c sugars high), and CaGluconate. On K+ restriction in diet. On telemetry. Follow levels closely     11. CUMMINS---- Follow ascites for paracentesis need     12. MEDICAL NONCOMPLIANCE      13. PANCYTOPENIA-------?recurrent  autoimmune thrombocytopenia. Last hospitalization, patient required plasmapheresis. , Hem/Onc to see    14. ACIDOSIS-------Metabolic. No elevation in AGAP. +Type 4 RTA. Give IV and po NaHCO3    15. RF ASSOCIATED HYPERPHOSPHATEMIA------Start Phoslo as binder and follow Phos    16. SECONDARY HYPERPARATHYROIDISM    17. ANTIPHOSPHOLIPID SYNDROME/HISTORY OF PE---status post IVC filter.  Currently on Arixtra.    Jorge Luis Goodwin MD  [facility]  [phone]  12/09/19  9:18 AM

## 2019-12-09 NOTE — ASSESSMENT & PLAN NOTE
Seen by ID, getting doxy for possible infection  Zosyn discontinued  Wound cx - MRSA - on doxy for 7 days per ID  Applied bactroban cream by ID

## 2019-12-09 NOTE — PROGRESS NOTES
Discharge Planning Assessment  [facility]     Patient Name: Lorenzo Crockett  MRN: [id number]  Today's Date: 12/9/2019    Admit Date: 12/6/2019    Discharge Needs Assessment     Row Name 12/09/19 1546       Living Environment    Lives With  parent(s)    Current Living Arrangements  home/apartment/condo    Primary Care Provided by  parent(s);self    Provides Primary Care For  no one, unable/limited ability to care for self    Family Caregiver if Needed  parent(s)    Able to Return to Prior Arrangements  yes       Resource/Environmental Concerns    Resource/Environmental Concerns  none       Transition Planning    Patient/Family Anticipates Transition to  home with family    Patient/Family Anticipated Services at Transition  none    Transportation Anticipated  family or friend will provide       Discharge Needs Assessment    Readmission Within the Last 30 Days  no previous admission in last 30 days    Concerns Comments  ARF - possible HD at DC.    Equipment Currently Used at Home  oxygen;walker, rolling;wheelchair [employer] 3-4 L pnc    Anticipated Changes Related to Illness  none    Equipment Needed After Discharge  none    Discharge Coordination/Progress  Return home with parents - possible outpt hemodialisys need - will follow.        Discharge Plan     Row Name 12/09/19 5994       Plan    Plan  Return home with parents - possible outpt hemodialisys need - will follow.                Expected Discharge Date and Time     Expected Discharge Date Expected Discharge Time    Dec 9, 2019         Demographic Summary     Row Name 12/09/19 1542       General Information    Admission Type  inpatient    Arrived From  emergency department    Referral Source  admission list    Reason for Consult  discharge planning    Preferred Language  English                   Herb Huitron RN

## 2019-12-09 NOTE — PROGRESS NOTES
Wound Initial Evaluation   [facility]     Patient Name: Lorenzo Crockett  : 1980  MRN: [id number]  Today's Date: 2019 Room Number: 231/1      Admit Date: 2019  Attending: Dallas Rocha MD    Consult Requested By: Dr. Greene    Reason For Consult: Chronic left lower extremity ulcer    Chief Complaint: Patient is known to service the ulceration of the left lower extremity for years.  Patient has a known TMA site.  History significant for lupus and PVD.  Per patient he reports the wound is been stable and he is followed by home health.    Visit Dx:    ICD-10-CM ICD-9-CM   1. Acute renal failure superimposed on chronic kidney disease, unspecified CKD stage, unspecified acute renal failure type (CMS/HCC) N17.9 584.9    N18.9 585.9   2. Anemia, unspecified type D64.9 285.9     Patient Active Problem List   Diagnosis   • Other ascites   • CAPS (catastrophic antiphospholipid syndrome) (CMS/HCC)   • Anxiety state   • Arthritis   • Asthma   • Benign essential hypertension   • Chronic narcotic dependence (CMS/HCC)   • Chronic pain disorder   • Chronic renal failure   • Deep vein thrombosis (DVT) of lower extremity (CMS/HCC)   • Degeneration of intervertebral disc of lumbar region   • Depression   • Gangrene of toe (CMS/HCC)   • Hepatic cirrhosis (CMS/HCC)   • Histoplasmosis   • History of hypertension   • PVD (peripheral vascular disease) (CMS/HCC)   • Insomnia   • Long term (current) use of anticoagulants   • Low back pain   • Megaloblastic anemia due to vitamin B12 deficiency   • Proctitis   • SLE (systemic lupus erythematosus) (CMS/HCC)   • Vitamin D deficiency   • Non-pressure chronic ulcer of left lower leg with fat layer exposed (CMS/HCC)   • Acute renal failure superimposed on chronic kidney disease (CMS/HCC)   • Other ascites   • Chronic respiratory failure with hypoxia (CMS/HCC)   • Amputation of foot (CMS/HCC)   • Anemia, chronic disease   • Hyperkalemia   • Secondary hyperparathyroidism  (CMS/HCC)       History:   Past Medical History:   Diagnosis Date   • Blood clot associated with vein wall inflammation    • Cirrhosis of liver (CMS/HCC)    • Enlarged heart    • Histoplasmosis    • Hypertension    • Lupus (CMS/HCC)    • Renal disorder     40% function     Past Surgical History:   Procedure Laterality Date   • BEDSIDE PARACENTESIS  8/22/2019        • FOOT SURGERY Left     toe removal   • IR STENT PLACEMENT Left     leg   • LUNG REMOVAL, PARTIAL Right     right lower lobe   • LUNG SURGERY      clot removal     Social History     Socioeconomic History   • Marital status: Single     Spouse name: Not on file   • Number of children: Not on file   • Years of education: Not on file   • Highest education level: Not on file   Tobacco Use   • Smoking status: Never Smoker   • Smokeless tobacco: Current User     Types: Chew   Substance and Sexual Activity   • Alcohol use: No     Frequency: Never   • Drug use: Yes     Types: Marijuana     Comment: occ.    • Sexual activity: Defer       Allergies:  Allergies   Allergen Reactions   • Tramadol Other (See Comments)     seizure   • Melon Rash       Medications:    Current Facility-Administered Medications:   •  baclofen (LIORESAL) tablet 10 mg, 10 mg, Oral, TID PRN, Pretty Contreras MD  •  bisacodyl (DULCOLAX) EC tablet 5 mg, 5 mg, Oral, Daily PRN, Pretty Contreras MD  •  bisacodyl (DULCOLAX) suppository 10 mg, 10 mg, Rectal, Daily PRN, Pretty Contreras MD  •  buprenorphine (SUBUTEX) SL tablet 20 mg, 20 mg, Sublingual, Daily, Pretty Contreras MD, 20 mg at 12/09/19 0819  •  calcium acetate (PHOS BINDER)) capsule 667 mg, 667 mg, Oral, TID With Meals, Pito Goodwin MD, 667 mg at 12/09/19 0819  •  calcium carbonate (TUMS) chewable tablet 500 mg (200 mg elemental), 1 tablet, Oral, BID PRN, Pretty Contreras MD  •  calcium gluconate 1g/50ml 0.675% NaCl IV SOLN, 1 g, Intravenous, Once, Pito Goodwin MD, Stopped at 12/07/19 1527  •  calcium-vitamin D 500-200 MG-UNIT per  tablet 1 tablet, 1 tablet, Oral, BID, Pretty Contreras MD, 1 tablet at 12/09/19 0819  •  carvedilol (COREG) tablet 3.125 mg, 3.125 mg, Oral, BID With Meals, Pito Goodwin MD, 3.125 mg at 12/09/19 0823  •  clonazePAM (KlonoPIN) tablet 0.5 mg, 0.5 mg, Oral, BID, Pretty Contreras MD, 0.5 mg at 12/09/19 0819  •  dextrose (D50W) 25 g/ 50mL Intravenous Solution 25 g, 25 g, Intravenous, Q15 Min PRN, Pito Goodwin MD  •  dextrose (GLUTOSE) oral gel 15 g, 15 g, Oral, Q15 Min PRN, Pito Goodwin MD  •  epoetin candy-epbx (RETACRIT) injection 20,000 Units, 20,000 Units, Subcutaneous, Q14 Days, Pito Goodwin MD, 20,000 Units at 12/08/19 1548  •  famotidine (PEPCID) tablet 20 mg, 20 mg, Oral, BID PRN, Pretty Contreras MD  •  febuxostat (ULORIC) tablet 40 mg, 40 mg, Oral, Daily, Pito Goodwin MD, 40 mg at 12/09/19 0818  •  folic acid (FOLVITE) tablet 1 mg, 1 mg, Oral, Daily, Pretty Contreras MD, 1 mg at 12/09/19 0818  •  fondaparinux (ARIXTRA) injection 7.5 mg, 7.5 mg, Subcutaneous, Daily, Pretty Contreras MD, 7.5 mg at 12/08/19 1549  •  gabapentin (NEURONTIN) capsule 300 mg, 300 mg, Oral, Q8H, Pretty Contreras MD, 300 mg at 12/09/19 0517  •  glucagon (human recombinant) (GLUCAGEN DIAGNOSTIC) injection 1 mg, 1 mg, Subcutaneous, Q15 Min PRN, Pito Goodwin MD  •  insulin lispro (humaLOG) injection 0-7 Units, 0-7 Units, Subcutaneous, 4x Daily With Meals & Nightly, 2 Units at 12/08/19 2016 **AND** insulin lispro (humaLOG) injection 0-7 Units, 0-7 Units, Subcutaneous, PRN, Pito Goodwin MD  •  iron sucrose 200 mg, 200 mg, Intravenous, Q24H, Pito Goodwin MD, 200 mg at 12/08/19 1550  •  ketamine (KETALAR) 27 mg in sodium chloride 0.9 % 100 mL infusion, 0.3 mg/kg, Intravenous, Daily PRN **AND** Ketamine Vital Signs & Assessment, , , Per Order Details, Dallas Rocha MD  •  lacosamide (VIMPAT) tablet 100 mg, 100 mg, Oral, BID, Pretty Contreras MD, 100 mg at  12/09/19 0819  •  levETIRAcetam (KEPPRA) tablet 500 mg, 500 mg, Oral, Q12H, Pretty Contreras MD, 500 mg at 12/09/19 0819  •  melatonin tablet 5 mg, 5 mg, Oral, Nightly PRN, Pretty Contreras MD  •  methylPREDNISolone sod succ 250 mg/100 mL 0.9% NS IVPB, 250 mg, Intravenous, Q24H, Pito Goodwin MD, 250 mg at 12/08/19 1146  •  midodrine (PROAMATINE) tablet 5 mg, 5 mg, Oral, TID AC, Pito Goodwin MD, 5 mg at 12/08/19 1147  •  mupirocin (BACTROBAN) 2 % ointment, , Topical, Q12H, Senthil Hemphill MD  •  nitroglycerin (NITROSTAT) SL tablet 0.4 mg, 0.4 mg, Sublingual, Q5 Min PRN, Pretty Contreras MD  •  ondansetron (ZOFRAN) tablet 4 mg, 4 mg, Oral, Q6H PRN **OR** ondansetron (ZOFRAN) injection 4 mg, 4 mg, Intravenous, Q6H PRN, Pretty Contreras MD  •  oxyCODONE (ROXICODONE) immediate release tablet 10 mg, 10 mg, Oral, Q8H PRN, Pretty Contreras MD, 10 mg at 12/09/19 0823  •  prochlorperazine (COMPAZINE) tablet 5 mg, 5 mg, Oral, Q8H PRN, Pretty Contreras MD  •  QUEtiapine (SEROquel) tablet 50 mg, 50 mg, Oral, Nightly PRN, Pretty Contreras MD  •  rOPINIRole (REQUIP) tablet 0.5 mg, 0.5 mg, Oral, Daily PRN, Pretty Contreras MD  •  sertraline (ZOLOFT) tablet 50 mg, 50 mg, Oral, Daily, Pretty Contreras MD, 50 mg at 12/09/19 0819  •  sodium bicarbonate tablet 1,300 mg, 1,300 mg, Oral, TID, Pito Goodwin MD, 1,300 mg at 12/09/19 0818  •  [COMPLETED] Insert peripheral IV, , , Once **AND** sodium chloride 0.9 % flush 10 mL, 10 mL, Intravenous, PRN, Octavio Baumann MD  •  sodium chloride 0.9 % flush 10 mL, 10 mL, Intravenous, Q12H, Pretty Contreras MD, 10 mL at 12/09/19 0824  •  sodium chloride 0.9 % flush 10 mL, 10 mL, Intravenous, PRN, Pretty Contreras MD  •  sodium chloride 0.9 % infusion, 75 mL/hr, Intravenous, Continuous, Pito Goodwin MD, Last Rate: 75 mL/hr at 12/09/19 0837, 75 mL/hr at 12/09/19 0837  •  vitamin B-12 (CYANOCOBALAMIN) tablet 250 mcg, 250 mcg, Oral, Daily, Pretty Contreras MD, 250 mcg at 12/09/19 0819    Results  Review:  Lab Results (last 48 hours)     Procedure Component Value Units Date/Time    POC Glucose Once [[id number]]  (Abnormal) Collected:  12/09/19 0703    Specimen:  Blood Updated:  12/09/19 0704     Glucose 124 mg/dL      Comment: Serial Number: [id number]:  [id number]       Comprehensive Metabolic Panel [[id number]]  (Abnormal) Collected:  12/09/19 0458    Specimen:  Blood Updated:  12/09/19 0601     Glucose 145 mg/dL      BUN 92 mg/dL      Creatinine 3.19 mg/dL      Sodium 140 mmol/L      Potassium 4.4 mmol/L      Chloride 100 mmol/L      CO2 25.0 mmol/L      Calcium 8.9 mg/dL      Total Protein 6.7 g/dL      Albumin 3.70 g/dL      ALT (SGPT) 10 U/L      AST (SGOT) 8 U/L      Alkaline Phosphatase 82 U/L      Total Bilirubin <0.2 mg/dL      eGFR Non African Amer 22 mL/min/1.73      Globulin 3.0 gm/dL      A/G Ratio 1.2 g/dL      BUN/Creatinine Ratio 28.8     Anion Gap 15.0 mmol/L     Narrative:       GFR Normal >60  Chronic Kidney Disease <60  Kidney Failure <15      Magnesium [[id number]]  (Abnormal) Collected:  12/09/19 0458    Specimen:  Blood Updated:  12/09/19 0601     Magnesium 2.8 mg/dL     Phosphorus [[id number]]  (Abnormal) Collected:  12/09/19 0458    Specimen:  Blood Updated:  12/09/19 0601     Phosphorus 6.2 mg/dL     Calcium, Ionized [[id number]]  (Normal) Collected:  12/09/19 0458    Specimen:  Blood Updated:  12/09/19 0549     Ionized Calcium 1.21 mmol/L     CBC & Differential [[id number]] Collected:  12/09/19 0437    Specimen:  Blood Updated:  12/09/19 0521    Narrative:       The following orders were created for panel order CBC & Differential.  Procedure                               Abnormality         Status                     ---------                               -----------         ------                     CBC Auto Differential[[id number]]        Abnormal            Final result                 Please view results for these tests on the individual orders.    CBC Auto Differential  [[id number]]  (Abnormal) Collected:  12/09/19 0437    Specimen:  Blood Updated:  12/09/19 0521     WBC 6.30 10*3/mm3      RBC 2.85 10*6/mm3      Hemoglobin 8.4 g/dL      Hematocrit 25.8 %      MCV 90.9 fL      MCH 29.4 pg      MCHC 32.4 g/dL      RDW 16.3 %      RDW-SD 52.5 fl      MPV 7.8 fL      Platelets 94 10*3/mm3      Neutrophil % 92.7 %      Lymphocyte % 4.1 %      Monocyte % 2.9 %      Eosinophil % 0.0 %      Basophil % 0.3 %      Neutrophils, Absolute 5.80 10*3/mm3      Lymphocytes, Absolute 0.30 10*3/mm3      Monocytes, Absolute 0.20 10*3/mm3      Eosinophils, Absolute 0.00 10*3/mm3      Basophils, Absolute 0.00 10*3/mm3      nRBC 0.1 /100 WBC     Wound Culture - Wound, Leg, Left [[id number]] Collected:  12/08/19 1613    Specimen:  Wound from Leg, Left Updated:  12/08/19 2046     Gram Stain Few (2+) WBCs observed - predominantly neutrophils      Occasional Gram positive cocci    POC Glucose Once [[id number]]  (Abnormal) Collected:  12/08/19 1914    Specimen:  Blood Updated:  12/08/19 1916     Glucose 170 mg/dL      Comment: Serial Number: [id number]:  [id number]       POC Glucose Once [[id number]]  (Abnormal) Collected:  12/08/19 1700    Specimen:  Blood Updated:  12/08/19 1704     Glucose 157 mg/dL      Comment: Serial Number: [id number]:  [id number]       Potassium [[id number]]  (Normal) Collected:  12/08/19 1247    Specimen:  Blood Updated:  12/08/19 1314     Potassium 5.0 mmol/L     Blood Culture - Blood, Arm, Left [[id number]] Collected:  12/08/19 1004    Specimen:  Blood from Arm, Left Updated:  12/08/19 1012    Blood Culture - Blood, Wrist, Right [[id number]] Collected:  12/08/19 1005    Specimen:  Blood from Wrist, Right Updated:  12/08/19 1012    POC Glucose Once [[id number]]  (Abnormal) Collected:  12/08/19 0959    Specimen:  Blood Updated:  12/08/19 1000     Glucose 215 mg/dL      Comment: Serial Number: [id number]:  [id number]       Calcium, Ionized [[id number]]  (Normal) Collected:   12/08/19 0737    Specimen:  Blood Updated:  12/08/19 0844     Ionized Calcium 1.21 mmol/L     C4+C3 [[id number]]  (Abnormal) Collected:  12/07/19 0837    Specimen:  Blood Updated:  12/08/19 0808     C3 Complement 90 mg/dL      C4 Complement 13 mg/dL     Narrative:       Performed at:  [address] [place] [address]  [id number]  : Jesus Manzo PhD, Phone:  [phone]    Comprehensive Metabolic Panel [[id number]]  (Abnormal) Collected:  12/08/19 0334    Specimen:  Blood Updated:  12/08/19 0522     Glucose 203 mg/dL      BUN 93 mg/dL      Creatinine 3.61 mg/dL      Sodium 134 mmol/L      Potassium 5.6 mmol/L      Chloride 97 mmol/L      CO2 21.0 mmol/L      Calcium 8.3 mg/dL      Total Protein 6.5 g/dL      Albumin 3.80 g/dL      ALT (SGPT) <5 U/L      AST (SGOT) 7 U/L      Alkaline Phosphatase 88 U/L      Total Bilirubin <0.2 mg/dL      eGFR Non African Amer 19 mL/min/1.73      Globulin 2.7 gm/dL      A/G Ratio 1.4 g/dL      BUN/Creatinine Ratio 25.8     Anion Gap 16.0 mmol/L     Narrative:       GFR Normal >60  Chronic Kidney Disease <60  Kidney Failure <15      Magnesium [[id number]]  (Abnormal) Collected:  12/08/19 0334    Specimen:  Blood Updated:  12/08/19 0509     Magnesium 3.0 mg/dL     Phosphorus [[id number]]  (Abnormal) Collected:  12/08/19 0334    Specimen:  Blood Updated:  12/08/19 0509     Phosphorus 6.5 mg/dL     CBC (No Diff) [[id number]]  (Abnormal) Collected:  12/08/19 0334    Specimen:  Blood Updated:  12/08/19 0455     WBC 3.20 10*3/mm3      RBC 2.92 10*6/mm3      Hemoglobin 8.4 g/dL      Hematocrit 25.6 %      MCV 87.4 fL      Comment: Result checked         MCH 28.8 pg      MCHC 32.9 g/dL      RDW 15.5 %      RDW-SD 48.1 fl      MPV 8.0 fL      Platelets 106 10*3/mm3     Protein, Urine, Random - Urine, Clean Catch [[id number]] Collected:  12/07/19 1824    Specimen:  Urine, Clean Catch Updated:  12/07/19 2104     Total Protein, Urine 205.6 mg/dL     Narrative:        Reference intervals for random urine have not been established.  Clinical usage is dependent upon physician's interpretation in combination with other laboratory tests.       Eosinophil Smear - Urine, Urine, Clean Catch [[id number]]  (Normal) Collected:  12/07/19 1824    Specimen:  Urine, Clean Catch Updated:  12/07/19 1925     Eosinophil Smear 0 % EOS/100 Cells     CK [[id number]]  (Abnormal) Collected:  12/07/19 0721    Specimen:  Blood Updated:  12/07/19 1240     Creatine Kinase 12 U/L     Uric Acid [[id number]]  (Abnormal) Collected:  12/07/19 0721    Specimen:  Blood Updated:  12/07/19 1240     Uric Acid 9.3 mg/dL         Imaging Results (Last 72 Hours)     Procedure Component Value Units Date/Time    US Renal Bilateral [[id number]] Collected:  12/06/19 2317     Updated:  12/06/19 2328    Narrative:       DATE OF EXAM:  12/6/2019 8:43 PM     PROCEDURE:  US RENAL BILATERAL-     INDICATIONS:  acute renal failure; N17.9-Acute kidney failure, unspecified;  N18.9-Chronic kidney disease, unspecified; D64.9-Anemia, unspecified     COMPARISON:  Renal ultrasound dated 08/21/2019     TECHNIQUE:   Grayscale and color Doppler ultrasound evaluation of the kidneys and  urinary bladder was performed.        FINDINGS:  Moderate amount of ascites is present. Kidneys are markedly echogenic  with mild cortical thinning. No hydronephrosis. Kidneys measure  approximately 11.3 and 9.3 cm in [id number] length on the right and  left respectively. The Bladder is empty.        Impression:       Markedly echogenic kidneys with mild cortical thinning. No  hydronephrosis.  Moderate amount of ascites.     Electronically Signed By-Yogesh Salmeron DO. On:12/6/2019 11:20 PM  This report was finalized on [id number] by  Yogesh Salmeron DO..    XR Chest 1 View [[id number]] Collected:  12/06/19 2014     Updated:  12/06/19 2020    Narrative:       XR CHEST 1 VW-     Date of Exam: 12/6/2019 6:55 PM     Indication: sob; N17.9-Acute kidney failure,  unspecified; N18.9-Chronic  kidney disease, unspecified; D64.9-Anemia, unspecified  39-year-old  history of lupus, on home oxygen, anemia     Comparison: 08/21/2019, 03/29/2019     Technique: 1 view(s) of the chest were obtained.     FINDINGS: There is still volume loss in the right chest which is  likely postsurgical. There are also sternotomy wires and postsurgical  changes in the mediastinum. Alveolar and interstitial opacities  bilaterally are present similar in appearance and distribution to  8/21/2019 and 3/29/2019. No pneumothorax or definite pleural effusion.  There is calcification along the right diaphragm which is unchanged.  Trachea is midline. No definite pleural effusions.       Impression:       There are postsurgical changes in the right chest as well as sternotomy  wires and surgical clips in the mediastinum. No significant change in  alveolar and airspace opacities bilaterally which may be chronic or due  to recurrent infection.        Electronically Signed By-Yogesh Salmeron DO. On:12/6/2019 8:18 PM  This report was finalized on [id number] by  Yogesh Salmeron DO..          Review of Systems:  Review of Systems   Constitutional: Negative for fever.   Respiratory: Negative for cough and shortness of breath.    Cardiovascular: Negative for chest pain and leg swelling.   Gastrointestinal: Negative for nausea and vomiting.   Skin: Positive for wound.   Psychiatric/Behavioral: Negative for agitation and confusion.       Physical Assessment:                       Left lower extremity ulcer: The ulceration has been present for years and is mostly related to some venous stasis changes.  The ulceration at this time is full-thickness and has a ready base there is a small to moderate amount of serosanguineous exudate noted.  There are no symptoms of infection noted no odor no induration no warmth no erythema.  Approximate size of the wound is 4 cm in length by 1-1/2 cm    Recommendation and Plan  Left lower  extremity ulceration I would recommend treating and continuing with a calcium alginate dressing ABD pad and Kerlix patient can follow-up after discharge with his current provider.  The wound does appear to be stable.    LESLYE Rascon   12/9/2019   9:49 AM

## 2019-12-10 ENCOUNTER — TELEPHONE (OUTPATIENT)
Dept: ONCOLOGY | Facility: CLINIC | Age: 39
End: 2019-12-10

## 2019-12-10 ENCOUNTER — APPOINTMENT (OUTPATIENT)
Dept: GENERAL RADIOLOGY | Facility: HOSPITAL | Age: 39
End: 2019-12-10

## 2019-12-10 ENCOUNTER — INPATIENT HOSPITAL (OUTPATIENT)
Dept: URBAN - METROPOLITAN AREA HOSPITAL 84 | Facility: HOSPITAL | Age: 39
End: 2019-12-10

## 2019-12-10 DIAGNOSIS — R14.0 ABDOMINAL DISTENSION (GASEOUS): ICD-10-CM

## 2019-12-10 DIAGNOSIS — K74.60 UNSPECIFIED CIRRHOSIS OF LIVER: ICD-10-CM

## 2019-12-10 DIAGNOSIS — I10 ESSENTIAL (PRIMARY) HYPERTENSION: ICD-10-CM

## 2019-12-10 DIAGNOSIS — R18.8 OTHER ASCITES: ICD-10-CM

## 2019-12-10 DIAGNOSIS — R41.82 ALTERED MENTAL STATUS, UNSPECIFIED: ICD-10-CM

## 2019-12-10 DIAGNOSIS — F91.8 OTHER CONDUCT DISORDERS: ICD-10-CM

## 2019-12-10 PROBLEM — J96.22 ACUTE ON CHRONIC RESPIRATORY FAILURE WITH HYPOXIA AND HYPERCAPNIA (HCC): Status: ACTIVE | Noted: 2019-12-06

## 2019-12-10 PROBLEM — J96.21 ACUTE ON CHRONIC RESPIRATORY FAILURE WITH HYPOXIA AND HYPERCAPNIA (HCC): Status: ACTIVE | Noted: 2019-12-06

## 2019-12-10 PROBLEM — R41.0 DELIRIUM, DRUG-INDUCED: Status: ACTIVE | Noted: 2019-12-10

## 2019-12-10 PROBLEM — T50.905A DELIRIUM, DRUG-INDUCED: Status: ACTIVE | Noted: 2019-12-10

## 2019-12-10 LAB
ALBUMIN SERPL-MCNC: 3.7 G/DL (ref 3.5–5.2)
ALBUMIN/GLOB SERPL: 1.3 G/DL
ALP SERPL-CCNC: 75 U/L (ref 39–117)
ALPHA-FETOPROTEIN: 2.47 NG/ML (ref 0–8.3)
ALPHA1 GLOB MFR UR ELPH: 244 MG/DL (ref 90–200)
ALT SERPL W P-5'-P-CCNC: 9 U/L (ref 1–41)
AMMONIA BLD-SCNC: 54 UMOL/L (ref 16–60)
AMPHET+METHAMPHET UR QL: NEGATIVE
ANION GAP SERPL CALCULATED.3IONS-SCNC: 13 MMOL/L (ref 5–15)
ARTERIAL PATENCY WRIST A: ABNORMAL
AST SERPL-CCNC: 9 U/L (ref 1–40)
ATMOSPHERIC PRESS: ABNORMAL MM[HG]
BACTERIA SPEC AEROBE CULT: ABNORMAL
BARBITURATES UR QL SCN: NEGATIVE
BASE EXCESS BLDA CALC-SCNC: -3.8 MMOL/L (ref 0–3)
BDY SITE: ABNORMAL
BENZODIAZ UR QL SCN: NEGATIVE
BILIRUB SERPL-MCNC: 0.2 MG/DL (ref 0.2–1.2)
BUN BLD-MCNC: 93 MG/DL (ref 6–20)
BUN/CREAT SERPL: 32.4 (ref 7–25)
CA-I BLDA-SCNC: 1.16 MMOL/L (ref 1.15–1.33)
CA-I SERPL ISE-MCNC: 1.2 MMOL/L (ref 1.2–1.3)
CALCIUM SPEC-SCNC: 8.7 MG/DL (ref 8.6–10.5)
CANNABINOIDS SERPL QL: NEGATIVE
CERULOPLASMIN SERPL-MCNC: 26 MG/DL (ref 16–31)
CHLORIDE SERPL-SCNC: 103 MMOL/L (ref 98–107)
CO2 BLDA-SCNC: 26 MMOL/L (ref 22–29)
CO2 SERPL-SCNC: 26 MMOL/L (ref 22–29)
COCAINE UR QL: NEGATIVE
CREAT BLD-MCNC: 2.87 MG/DL (ref 0.76–1.27)
D-LACTATE SERPL-SCNC: 3.2 MMOL/L (ref 0.5–2)
DEPRECATED RDW RBC AUTO: 54.3 FL (ref 37–54)
ERYTHROCYTE [DISTWIDTH] IN BLOOD BY AUTOMATED COUNT: 16.5 % (ref 12.3–15.4)
FERRITIN SERPL-MCNC: 383.1 NG/ML (ref 30–400)
GFR SERPL CREATININE-BSD FRML MDRD: 25 ML/MIN/1.73
GGT SERPL-CCNC: 38 U/L (ref 8–61)
GLOBULIN UR ELPH-MCNC: 2.8 GM/DL
GLUCOSE BLD-MCNC: 141 MG/DL (ref 65–99)
GLUCOSE BLDC GLUCOMTR-MCNC: 130 MG/DL (ref 70–105)
GLUCOSE BLDC GLUCOMTR-MCNC: 138 MG/DL (ref 70–105)
GLUCOSE BLDC GLUCOMTR-MCNC: 144 MG/DL (ref 74–100)
GLUCOSE BLDC GLUCOMTR-MCNC: 168 MG/DL (ref 70–105)
GRAM STN SPEC: ABNORMAL
GRAM STN SPEC: ABNORMAL
HCO3 BLDA-SCNC: 24.2 MMOL/L (ref 21–28)
HCT VFR BLD AUTO: 26.2 % (ref 37.5–51)
HCT VFR BLDA CALC: 31 % (ref 38–51)
HEMODILUTION: NO
HGB BLD-MCNC: 8.2 G/DL (ref 13–17.7)
HGB BLDA-MCNC: 10.6 G/DL (ref 12–17)
HOROWITZ INDEX BLD+IHG-RTO: 44 %
INR PPP: 1.5 (ref 0.9–1.1)
IRON 24H UR-MRATE: 132 MCG/DL (ref 59–158)
MAGNESIUM SERPL-MCNC: 2.7 MG/DL (ref 1.6–2.6)
MCH RBC QN AUTO: 28.9 PG (ref 26.6–33)
MCHC RBC AUTO-ENTMCNC: 31.2 G/DL (ref 31.5–35.7)
MCV RBC AUTO: 92.7 FL (ref 79–97)
METHADONE UR QL SCN: NEGATIVE
MODALITY: ABNORMAL
NT-PROBNP SERPL-MCNC: ABNORMAL PG/ML (ref 5–450)
OPIATES UR QL: NEGATIVE
OXYCODONE UR QL SCN: POSITIVE
PCO2 BLDA: 58.4 MM HG (ref 35–48)
PH BLDA: 7.23 PH UNITS (ref 7.35–7.45)
PHOSPHATE SERPL-MCNC: 4.6 MG/DL (ref 2.5–4.5)
PLATELET # BLD AUTO: 50 10*3/MM3 (ref 140–450)
PMV BLD AUTO: 8.3 FL (ref 6–12)
PO2 BLDA: 60.4 MM HG (ref 83–108)
POTASSIUM BLD-SCNC: 4.4 MMOL/L (ref 3.5–5.2)
POTASSIUM BLDA-SCNC: 4.1 MMOL/L (ref 3.5–4.5)
PROT SERPL-MCNC: 6.5 G/DL (ref 6–8.5)
PROTHROMBIN TIME: 14.8 SECONDS (ref 9.6–11.7)
RBC # BLD AUTO: 2.83 10*6/MM3 (ref 4.14–5.8)
SAO2 % BLDCOA: 85 % (ref 94–98)
SODIUM BLD-SCNC: 142 MMOL/L (ref 136–145)
SODIUM BLDA-SCNC: 140 MMOL/L (ref 138–146)
URATE SERPL-MCNC: 6.7 MG/DL (ref 3.4–7)
WBC NRBC COR # BLD: 5.1 10*3/MM3 (ref 3.4–10.8)

## 2019-12-10 PROCEDURE — 86235 NUCLEAR ANTIGEN ANTIBODY: CPT | Performed by: NURSE PRACTITIONER

## 2019-12-10 PROCEDURE — 84550 ASSAY OF BLOOD/URIC ACID: CPT | Performed by: INTERNAL MEDICINE

## 2019-12-10 PROCEDURE — 99222 1ST HOSP IP/OBS MODERATE 55: CPT | Performed by: NURSE PRACTITIONER

## 2019-12-10 PROCEDURE — 63710000001 INSULIN LISPRO (HUMAN) PER 5 UNITS: Performed by: INTERNAL MEDICINE

## 2019-12-10 PROCEDURE — 82728 ASSAY OF FERRITIN: CPT | Performed by: NURSE PRACTITIONER

## 2019-12-10 PROCEDURE — 80307 DRUG TEST PRSMV CHEM ANLYZR: CPT | Performed by: INTERNAL MEDICINE

## 2019-12-10 PROCEDURE — 80051 ELECTROLYTE PANEL: CPT

## 2019-12-10 PROCEDURE — 84100 ASSAY OF PHOSPHORUS: CPT | Performed by: INTERNAL MEDICINE

## 2019-12-10 PROCEDURE — 86225 DNA ANTIBODY NATIVE: CPT | Performed by: NURSE PRACTITIONER

## 2019-12-10 PROCEDURE — 80074 ACUTE HEPATITIS PANEL: CPT | Performed by: NURSE PRACTITIONER

## 2019-12-10 PROCEDURE — 82977 ASSAY OF GGT: CPT | Performed by: NURSE PRACTITIONER

## 2019-12-10 PROCEDURE — 83540 ASSAY OF IRON: CPT | Performed by: NURSE PRACTITIONER

## 2019-12-10 PROCEDURE — 85018 HEMOGLOBIN: CPT

## 2019-12-10 PROCEDURE — 83735 ASSAY OF MAGNESIUM: CPT | Performed by: INTERNAL MEDICINE

## 2019-12-10 PROCEDURE — 71045 X-RAY EXAM CHEST 1 VIEW: CPT

## 2019-12-10 PROCEDURE — 82330 ASSAY OF CALCIUM: CPT

## 2019-12-10 PROCEDURE — 82330 ASSAY OF CALCIUM: CPT | Performed by: INTERNAL MEDICINE

## 2019-12-10 PROCEDURE — 82390 ASSAY OF CERULOPLASMIN: CPT | Performed by: NURSE PRACTITIONER

## 2019-12-10 PROCEDURE — 83516 IMMUNOASSAY NONANTIBODY: CPT | Performed by: NURSE PRACTITIONER

## 2019-12-10 PROCEDURE — 94660 CPAP INITIATION&MGMT: CPT

## 2019-12-10 PROCEDURE — 36600 WITHDRAWAL OF ARTERIAL BLOOD: CPT

## 2019-12-10 PROCEDURE — 83880 ASSAY OF NATRIURETIC PEPTIDE: CPT | Performed by: INTERNAL MEDICINE

## 2019-12-10 PROCEDURE — 99233 SBSQ HOSP IP/OBS HIGH 50: CPT | Performed by: INTERNAL MEDICINE

## 2019-12-10 PROCEDURE — 85027 COMPLETE CBC AUTOMATED: CPT | Performed by: INTERNAL MEDICINE

## 2019-12-10 PROCEDURE — 82803 BLOOD GASES ANY COMBINATION: CPT

## 2019-12-10 PROCEDURE — 85610 PROTHROMBIN TIME: CPT | Performed by: INTERNAL MEDICINE

## 2019-12-10 PROCEDURE — 86038 ANTINUCLEAR ANTIBODIES: CPT | Performed by: NURSE PRACTITIONER

## 2019-12-10 PROCEDURE — 94799 UNLISTED PULMONARY SVC/PX: CPT

## 2019-12-10 PROCEDURE — 86376 MICROSOMAL ANTIBODY EACH: CPT | Performed by: NURSE PRACTITIONER

## 2019-12-10 PROCEDURE — 83605 ASSAY OF LACTIC ACID: CPT

## 2019-12-10 PROCEDURE — 80053 COMPREHEN METABOLIC PANEL: CPT | Performed by: INTERNAL MEDICINE

## 2019-12-10 PROCEDURE — 82962 GLUCOSE BLOOD TEST: CPT

## 2019-12-10 PROCEDURE — 25010000002 METHYLPREDNISOLONE PER 125 MG: Performed by: INTERNAL MEDICINE

## 2019-12-10 PROCEDURE — 82140 ASSAY OF AMMONIA: CPT | Performed by: INTERNAL MEDICINE

## 2019-12-10 PROCEDURE — 82105 ALPHA-FETOPROTEIN SERUM: CPT | Performed by: NURSE PRACTITIONER

## 2019-12-10 PROCEDURE — 82103 ALPHA-1-ANTITRYPSIN TOTAL: CPT | Performed by: NURSE PRACTITIONER

## 2019-12-10 RX ORDER — LACTULOSE 10 G/15ML
20 SOLUTION ORAL 3 TIMES DAILY
Status: DISCONTINUED | OUTPATIENT
Start: 2019-12-10 | End: 2019-12-13 | Stop reason: HOSPADM

## 2019-12-10 RX ORDER — HALOPERIDOL 5 MG/ML
1 INJECTION INTRAMUSCULAR EVERY 6 HOURS PRN
Status: DISCONTINUED | OUTPATIENT
Start: 2019-12-10 | End: 2019-12-11

## 2019-12-10 RX ORDER — METHYLPREDNISOLONE SODIUM SUCCINATE 125 MG/2ML
60 INJECTION, POWDER, LYOPHILIZED, FOR SOLUTION INTRAMUSCULAR; INTRAVENOUS DAILY
Status: DISCONTINUED | OUTPATIENT
Start: 2019-12-10 | End: 2019-12-11

## 2019-12-10 RX ORDER — DOXYCYCLINE 100 MG/1
100 TABLET ORAL EVERY 12 HOURS SCHEDULED
Status: DISCONTINUED | OUTPATIENT
Start: 2019-12-10 | End: 2019-12-13 | Stop reason: HOSPADM

## 2019-12-10 RX ORDER — BUMETANIDE 0.25 MG/ML
1 INJECTION INTRAMUSCULAR; INTRAVENOUS EVERY 12 HOURS
Status: DISCONTINUED | OUTPATIENT
Start: 2019-12-10 | End: 2019-12-10

## 2019-12-10 RX ORDER — BUMETANIDE 0.25 MG/ML
1 INJECTION INTRAMUSCULAR; INTRAVENOUS ONCE
Status: COMPLETED | OUTPATIENT
Start: 2019-12-10 | End: 2019-12-10

## 2019-12-10 RX ADMIN — BUMETANIDE 1 MG: 0.25 INJECTION INTRAMUSCULAR; INTRAVENOUS at 10:50

## 2019-12-10 RX ADMIN — MUPIROCIN 1 APPLICATION: 20 OINTMENT TOPICAL at 17:25

## 2019-12-10 RX ADMIN — DOXYCYCLINE 100 MG: 100 TABLET, FILM COATED ORAL at 17:26

## 2019-12-10 RX ADMIN — INSULIN LISPRO 2 UNITS: 100 INJECTION, SOLUTION INTRAVENOUS; SUBCUTANEOUS at 22:36

## 2019-12-10 RX ADMIN — CLONAZEPAM 0.5 MG: 0.5 TABLET ORAL at 11:40

## 2019-12-10 RX ADMIN — GABAPENTIN 300 MG: 300 CAPSULE ORAL at 22:33

## 2019-12-10 RX ADMIN — LACTULOSE 20 G: 10 SOLUTION ORAL at 13:54

## 2019-12-10 RX ADMIN — MUPIROCIN: 20 OINTMENT TOPICAL at 22:40

## 2019-12-10 RX ADMIN — SODIUM BICARBONATE 650 MG TABLET 1300 MG: at 17:26

## 2019-12-10 RX ADMIN — LEVETIRACETAM 500 MG: 500 TABLET ORAL at 22:33

## 2019-12-10 RX ADMIN — CLONAZEPAM 0.5 MG: 0.5 TABLET ORAL at 22:33

## 2019-12-10 RX ADMIN — Medication 10 ML: at 10:51

## 2019-12-10 RX ADMIN — GABAPENTIN 300 MG: 300 CAPSULE ORAL at 17:26

## 2019-12-10 RX ADMIN — Medication 27 MG: at 23:50

## 2019-12-10 RX ADMIN — SODIUM BICARBONATE 650 MG TABLET 1300 MG: at 22:33

## 2019-12-10 RX ADMIN — BUPRENORPHINE HYDROCHLORIDE 20 MG: 8 TABLET SUBLINGUAL at 13:53

## 2019-12-10 RX ADMIN — METHYLPREDNISOLONE SODIUM SUCCINATE 60 MG: 125 INJECTION, POWDER, FOR SOLUTION INTRAMUSCULAR; INTRAVENOUS at 10:50

## 2019-12-10 RX ADMIN — LACTULOSE 20 G: 10 SOLUTION ORAL at 17:39

## 2019-12-10 RX ADMIN — LACOSAMIDE 100 MG: 100 TABLET, FILM COATED ORAL at 22:33

## 2019-12-10 RX ADMIN — CARVEDILOL 3.12 MG: 3.12 TABLET, FILM COATED ORAL at 17:26

## 2019-12-10 RX ADMIN — RIFAXIMIN 550 MG: 550 TABLET ORAL at 22:34

## 2019-12-10 RX ADMIN — OYSTER SHELL CALCIUM WITH VITAMIN D 1 TABLET: 500; 200 TABLET, FILM COATED ORAL at 22:33

## 2019-12-10 RX ADMIN — Medication 10 ML: at 22:34

## 2019-12-10 NOTE — CONSULTS
Nutrition Services    Patient Name:  Lorenzo Crockett  YOB: 1980  MRN: [id number]  Admit Date:  12/6/2019    Progress note:      Consulted by food and nutrition staff r/t patient requesting double portions of meat at meals. At attempted visit today, pt very agitated. He was yelling at staff and security was called. It is not medically neccessary for pt to receive double meat at this time, so I will not recommend this. Unable to speak with pt at this time r/t change in mental status.     Will follow prn.     Electronically signed by:  Kim Salomon RD  12/10/19 1:30 PM

## 2019-12-10 NOTE — TELEPHONE ENCOUNTER
----- Message from Oma Mak MD sent at 12/6/2019  8:10 AM EST -----  Tell patient his renal function has worsened. Lets make sure he went to ER as instructed. Ask if renal saw him too

## 2019-12-10 NOTE — CONSULTS
GI CONSULT  NOTE:    Referring Provider:  Dr. Contreras    Chief complaint: Cirrhosis    Subjective .     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  We have been asked to consult for the patient's history of cirrhosis.  The patient was last seen by our service in 3/2015 at which time he underwent EGD/colonoscopy by Dr. Roberson which showed severe erosive gastritis, hiatal hernia, esophagitis with a GE junction nodule, and possible C. difficile colitis.  I cannot find record in our system that the patient was ever worked up for his cirrhosis.  The patient is currently unable to help supplement history due to altered mental status.  He is unable to answer questions at this time.  Currently on continuous BiPAP.      Endo History:  3/2015 EGD/colonoscopy (Dr. Roberson)- severe erosive gastritis, hiatal hernia, esophagitis with a GE junction nodule, and possible C. difficile colitis.    Past Medical History:  Past Medical History:   Diagnosis Date   • Blood clot associated with vein wall inflammation    • Cirrhosis of liver (CMS/HCC)    • Enlarged heart    • Histoplasmosis    • Hypertension    • Lupus (CMS/HCC)    • Renal disorder     40% function       Past Surgical History:  Past Surgical History:   Procedure Laterality Date   • BEDSIDE PARACENTESIS  8/22/2019        • FOOT SURGERY Left     toe removal   • IR STENT PLACEMENT Left     leg   • LUNG REMOVAL, PARTIAL Right     right lower lobe   • LUNG SURGERY      clot removal       Social History:  Social History     Tobacco Use   • Smoking status: Never Smoker   • Smokeless tobacco: Current User     Types: Chew   Substance Use Topics   • Alcohol use: No     Frequency: Never    • Drug use: Yes     Types: Marijuana     Comment: occ.        Family History:  No family history on file.    Medications:  Medications Prior to Admission   Medication Sig Dispense Refill Last Dose   • amLODIPine (NORVASC) 10 MG tablet Take 10 mg by mouth Daily.   12/6/2019 at Unknown time   • bumetanide (BUMEX) 1 MG tablet Take 1 mg by mouth 2 (Two) Times a Day.   12/6/2019 at Unknown time   • buprenorphine (SUBUTEX) 8 MG sublingual tablet SL tablet Place 20 mg under the tongue Daily. Unknown if pt took dose 08/21/19 & quantity   12/6/2019 at Unknown time   • calcium carbonate (OS-BLU) 600 MG tablet Take 600 mg by mouth 2 (Two) Times a Day.   12/6/2019 at Unknown time   • carvedilol (COREG) 25 MG tablet Take 25 mg by mouth 2 (Two) Times a Day With Meals.   12/6/2019 at Unknown time   • clonazePAM (KlonoPIN) 0.5 MG tablet Take 0.5 mg by mouth 2 (Two) Times a Day.   12/6/2019 at Unknown time   • cloNIDine (CATAPRES) 0.1 MG tablet Take 0.1 mg by mouth 3 (Three) Times a Day.   12/6/2019 at Unknown time   • Cyanocobalamin (B-12) 500 MCG sublingual tablet Place 1 tablet under the tongue 2 (Two) Times a Day.   12/6/2019 at Unknown time   • famotidine (PEPCID) 20 MG tablet Take 20 mg by mouth 2 (Two) Times a Day As Needed for Heartburn.   12/6/2019 at Unknown time   • ferrous sulfate 324 (65 Fe) MG tablet delayed-release EC tablet Take 324 mg by mouth Daily.   12/6/2019 at Unknown time   • folic acid (FOLVITE) 1 MG tablet Take 1 mg by mouth Daily.   12/6/2019 at Unknown time   • fondaparinux (ARIXTRA) 7.5 MG/0.6ML solution injection Inject 7.5 mg under the skin into the appropriate area as directed Daily.   12/5/2019 at Unknown time   • gabapentin (NEURONTIN) 800 MG tablet Take 800 mg by mouth 4 (Four) Times a Day.   12/6/2019 at Unknown time   • lacosamide (VIMPAT) 100 MG tablet tablet Take 100 mg by mouth 2 (Two) Times a Day.   12/6/2019 at Unknown time   • levETIRAcetam (KEPPRA) 500 MG tablet Take 500 mg by mouth 2  (Two) Times a Day.   12/6/2019 at Unknown time   • sertraline (ZOLOFT) 50 MG tablet Take 50 mg by mouth Daily.   12/6/2019 at Unknown time   • baclofen (LIORESAL) 20 MG tablet Take 0.5 tablets by mouth 3 (Three) Times a Day As Needed for Muscle Spasms.   Unknown at Unknown time   • prochlorperazine (COMPAZINE) 5 MG tablet Take 5 mg by mouth Every 8 (Eight) Hours As Needed for Nausea or Vomiting.   Unknown at Unknown time   • QUEtiapine (SEROquel) 50 MG tablet Take 50 mg by mouth At Night As Needed (for sleep).   Unknown at Unknown time   • rOPINIRole (REQUIP) 0.5 MG tablet Take 0.5 mg by mouth Daily As Needed (for RLS).   Unknown at Unknown time       Scheduled Meds:  buprenorphine 20 mg Sublingual Daily   calcium gluconate 1 g Intravenous Once   calcium-vitamin D 1 tablet Oral BID   carvedilol 3.125 mg Oral BID With Meals   clonazePAM 0.5 mg Oral BID   epoetin candy/candy-epbx 20,000 Units Subcutaneous Q14 Days   febuxostat 40 mg Oral Daily   folic acid 1 mg Oral Daily   fondaparinux 7.5 mg Subcutaneous Daily   gabapentin 300 mg Oral Q8H   insulin lispro 0-7 Units Subcutaneous 4x Daily With Meals & Nightly   lacosamide 100 mg Oral BID   lactulose 20 g Oral TID   levETIRAcetam 500 mg Oral Q12H   methylPREDNISolone sodium succinate 60 mg Intravenous Daily   mupirocin  Topical Q12H   sertraline 50 mg Oral Daily   sodium bicarbonate 1,300 mg Oral TID   sodium chloride 10 mL Intravenous Q12H   vitamin B-12 250 mcg Oral Daily     Continuous Infusions:   PRN Meds:.•  baclofen  •  bisacodyl  •  bisacodyl  •  calcium carbonate  •  dextrose  •  dextrose  •  famotidine  •  glucagon (human recombinant)  •  insulin lispro **AND** insulin lispro  •  ketamine (KETALAR) infusion **AND** Ketamine Vital Signs & Assessment  •  melatonin  •  nitroglycerin  •  ondansetron **OR** ondansetron  •  prochlorperazine  •  QUEtiapine  •  rOPINIRole  •  [COMPLETED] Insert peripheral IV **AND** sodium chloride  •  sodium  chloride    ALLERGIES:  Tramadol and Melon    ROS:  Review of Systems   Unable to perform ROS: Mental status change       Objective     Vital Signs:   Temp:  [96.7 °F (35.9 °C)-98.3 °F (36.8 °C)] 96.7 °F (35.9 °C)  Heart Rate:  [] 88  Resp:  [16-18] 18  BP: (146-180)/() 151/100    Physical Exam:      General Appearance:    Awake, only moans to questioning, ill-appearing in bed   Head:    Normocephalic, without obvious abnormality, atraumatic   Eyes:            Conjunctivae normal, anicteric sclera   Ears:    Ears appear intact with no abnormalities noted   Throat:   No oral lesions, no thrush, oral mucosa moist   Neck:   No adenopathy, supple, no thyromegaly, no JVD   Lungs:     Clear to auscultation bilaterally, diminished bilaterally, respirations regular, even and unlabored, on continuous Bipap    Heart:    Regular rhythm and normal rate, normal S1 and S2, no            murmur, no gallop, no rub   Chest Wall:    No abnormalities observed   Abdomen:     Normal bowel sounds, soft, generalized tenderness, no rebound or guarding, moderate distention, no hepatosplenomegaly    Rectal:     Deferred   Extremities:   Moves all extremities well, no edema, no cyanosis, no             Redness, left foot amputation   Pulses:   Pulses palpable and equal bilaterally   Skin:   No bleeding, bruising or rash, no jaundice   Lymph nodes:   No palpable adenopathy   Neurologic:   Cranial nerves 2 - 12 grossly intact, no asterixis, sensation intact       Results Review:   I reviewed the patient's labs and imaging.  Lab Results (last 24 hours)     Procedure Component Value Units Date/Time    Ammonia [[id number]]  (Normal) Collected:  12/10/19 0858    Specimen:  Blood Updated:  12/10/19 0936     Ammonia 54 umol/L     Protime-INR [[id number]]  (Abnormal) Collected:  12/10/19 0914    Specimen:  Blood Updated:  12/10/19 0925     Protime 14.8 Seconds      INR 1.50    Wound Culture - Wound, Leg, Left [[id number]]  (Abnormal)   (Susceptibility) Collected:  12/08/19 1613    Specimen:  Wound from Leg, Left Updated:  12/10/19 0812     Wound Culture Moderate growth (3+) Staphylococcus aureus, MRSA     Comment:   Methicillin resistant Staphylococcus aureus, Patient may be an isolation risk.        Gram Stain Few (2+) WBCs observed - predominantly neutrophils      Occasional Gram positive cocci    Susceptibility      Staphylococcus aureus, MRSA     PAULA     Clindamycin Susceptible     Erythromycin Resistant     Inducible Clindamycin Resistance Negative     Oxacillin Resistant     Penicillin G Resistant     Rifampin Susceptible     Tetracycline Susceptible     Trimethoprim + Sulfamethoxazole Susceptible     Vancomycin Susceptible                Susceptibility Comments     Staphylococcus aureus, MRSA    This isolate does not demonstrate inducible clindamycin resistance in vitro.               POCT Electrolytes +HGB +HCT [[id number]]  (Abnormal) Collected:  12/10/19 0718    Specimen:  Blood Updated:  12/10/19 0722     Sodium 140 mmol/L      Potassium 4.1 mmol/L      Ionized Calcium 1.16 mmol/L      Comment: Serial Number: [id number]:  [id number]        Glucose 144 mg/dL      Hematocrit 31 %      Hemoglobin 10.6 g/dL     Blood Gas, Arterial [[id number]]  (Abnormal) Collected:  12/10/19 0718    Specimen:  Arterial Blood Updated:  12/10/19 0722     Site Right Brachial     Vivek's Test N/A     pH, Arterial 7.226 pH units      pCO2, Arterial 58.4 mm Hg      pO2, Arterial 60.4 mm Hg      HCO3, Arterial 24.2 mmol/L      Base Excess, Arterial -3.8 mmol/L      Comment: Serial Number: [id number]:  [id number]        O2 Saturation, Arterial 85.0 %      CO2 Content 26.0 mmol/L      Barometric Pressure for Blood Gas --     Comment: N/A        Modality Cannula     FIO2 44 %      Hemodilution No    POC Lactate [[id number]] Collected:  12/10/19 0718    Specimen:  Blood Updated:  12/10/19 0722    POC Glucose Once [[id number]]  (Abnormal) Collected:  12/10/19 0705     Specimen:  Blood Updated:  12/10/19 0707     Glucose 130 mg/dL      Comment: Serial Number: [id number]:  [id number]       Uric Acid [[id number]]  (Normal) Collected:  12/10/19 0422    Specimen:  Blood Updated:  12/10/19 0532     Uric Acid 6.7 mg/dL     Phosphorus [[id number]]  (Abnormal) Collected:  12/10/19 0422    Specimen:  Blood Updated:  12/10/19 0532     Phosphorus 4.6 mg/dL     Comprehensive Metabolic Panel [[id number]]  (Abnormal) Collected:  12/10/19 0422    Specimen:  Blood Updated:  12/10/19 0531     Glucose 141 mg/dL      BUN 93 mg/dL      Creatinine 2.87 mg/dL      Sodium 142 mmol/L      Potassium 4.4 mmol/L      Chloride 103 mmol/L      CO2 26.0 mmol/L      Calcium 8.7 mg/dL      Total Protein 6.5 g/dL      Albumin 3.70 g/dL      ALT (SGPT) 9 U/L      AST (SGOT) 9 U/L      Alkaline Phosphatase 75 U/L      Total Bilirubin 0.2 mg/dL      eGFR Non African Amer 25 mL/min/1.73      Globulin 2.8 gm/dL      A/G Ratio 1.3 g/dL      BUN/Creatinine Ratio 32.4     Anion Gap 13.0 mmol/L     Narrative:       GFR Normal >60  Chronic Kidney Disease <60  Kidney Failure <15      Magnesium [[id number]]  (Abnormal) Collected:  12/10/19 0422    Specimen:  Blood Updated:  12/10/19 0531     Magnesium 2.7 mg/dL     Calcium, Ionized [[id number]]  (Normal) Collected:  12/10/19 0422    Specimen:  Blood Updated:  12/10/19 0519     Ionized Calcium 1.20 mmol/L     CBC (No Diff) [[id number]]  (Abnormal) Collected:  12/10/19 0422    Specimen:  Blood Updated:  12/10/19 0519     WBC 5.10 10*3/mm3      RBC 2.83 10*6/mm3      Hemoglobin 8.2 g/dL      Hematocrit 26.2 %      MCV 92.7 fL      MCH 28.9 pg      MCHC 31.2 g/dL      RDW 16.5 %      RDW-SD 54.3 fl      MPV 8.3 fL      Platelets 50 10*3/mm3     POC Glucose Once [[id number]]  (Abnormal) Collected:  12/09/19 1926    Specimen:  Blood Updated:  12/09/19 1927     Glucose 206 mg/dL      Comment: Serial Number: [id number]:  [id number]       Protein / Creatinine Ratio, Urine -  Urine, Clean Catch [[id number]]  (Abnormal) Collected:  12/07/19 1824    Specimen:  Urine, Clean Catch Updated:  12/09/19 1727     Protein/Creatinine Ratio, Urine 2,320.2 mg/G Crea      Creatinine, Urine 98.7 mg/dL      Total Protein, Urine 229.0 mg/dL     POC Glucose Once [[id number]]  (Abnormal) Collected:  12/09/19 1641    Specimen:  Blood Updated:  12/09/19 1647     Glucose 164 mg/dL      Comment: Serial Number: [id number]:  [id number]       Blood Culture - Blood, Arm, Left [[id number]] Collected:  12/08/19 1004    Specimen:  Blood from Arm, Left Updated:  12/09/19 1111     Blood Culture No growth at 24 hours    Blood Culture - Blood, Wrist, Right [[id number]] Collected:  12/08/19 1005    Specimen:  Blood from Wrist, Right Updated:  12/09/19 1111     Blood Culture No growth at 24 hours          Imaging Results (Last 24 Hours)     ** No results found for the last 24 hours. **             ASSESSMENT:  -Cirrhosis complicated by ascites  -Altered mental status -ammonia normal  -Acute on CKD  -Catastrophic antiphospholipid syndrome  -Hypertension  -Chronic pain  -Lupus  -Recurrent PE/DVT on Arixtra  -History of left foot amputation due to clot  -History of seizures  -History of right thoracotomy with wedge resection and thrombectomy,      PLAN:  We have been asked to consult for cirrhosis.  We will plan full liver serologies as I do not have record of previous work-up.  Also plan right upper quadrant ultrasound.  Check AFP.  The patient does have ascites.  Paracentesis has been ordered.  Await fluid studies.  Nephrology is following and managing diuretics.  The patient does have altered mental status, however ammonia level is normal.  Start Xifaxan.  MELD 15.  Patient will need outpatient EGD to screen for varices.   Encourage nutrition. Continue Renal diet.   If patient is not taking PO, consider dobhoff tube placement for nutrition.   Supportive care.     I discussed the patients findings and my recommendations  with the patient.  LESLYE Aguilar  12/10/19  10:52 AM

## 2019-12-10 NOTE — PROGRESS NOTES
Continued Stay Note  REINIER Ward     Patient Name: Lorenzo Crockett  MRN: [id number]  Today's Date: 12/10/2019    Admit Date: 12/6/2019    Discharge Plan     Row Name 12/10/19 1314       Plan    Plan Comments  Barriers to DC: fast team call 12-10, patient combative and confused.  CT head pending.  paracentisis pending.              Dawna Quijano RN    [phone]

## 2019-12-10 NOTE — ASSESSMENT & PLAN NOTE
Suspicious for withdrawal  Also getting lactulose for possible hepatic encephalopathy  UDS positive for oxy- he was on a few days ago  Got  zyprexa  last night - discontinued since back to baseline in mentation  Discontinued ketamine

## 2019-12-10 NOTE — PLAN OF CARE
Problem: Anemia (Adult)  Goal: Identify Related Risk Factors and Signs and Symptoms  Outcome: Ongoing (interventions implemented as appropriate)     Problem: Patient Care Overview  Goal: Plan of Care Review  Outcome: Ongoing (interventions implemented as appropriate)  Flowsheets (Taken 12/10/2019 2501)  Progress: improving  Plan of Care Reviewed With: patient  Outcome Summary: Patient opioid medication stopped, woke in the middle of night combative blamming staff for his oxygen being off and IV removed

## 2019-12-10 NOTE — NURSING NOTE
@11:35 Call placed stat to [phone] to page Dr. Contreras &  said that Dr. Rocha is the one showing up for the pt. I let her know that Dr. Contreras was just here & this is to be a stat call & she stated that she would not page her & could only page Dr. Rocha.

## 2019-12-10 NOTE — NURSING NOTE
While getting shift report the patient started acting combative. Called security for safety of patient and staff. Patient looked hypoxic. Fast team was initiated. Patient being moved to PCU for further evaluation.

## 2019-12-10 NOTE — PROGRESS NOTES
[facility]   INPATIENT PROGRESS NOTE    Date of Admission: 12/6/2019  Length of Stay: 3  Primary Care Physician: Lori Hale NP    Subjective    Confused. Seen during rapid response  Subjective   CC: worsening renal function    HPI:    39 y.o. male  With complex medical history of CAPS, recurrent PE and DVT on arixtra, L foot amputation due to clot, s/p LLE bypass surgery, chronic hypoxia on home O2 24/7, liver cirrhosis with ascites, s/p paracentesis twice, SLE and CKD stage 3 who was sent in by  due to worsening renal function.Cr 3.9. Baseline 1.6-1.7.   He denies any change in his health but reports worsening ascites lately. Denies weight gain. Unable to eat much due to easy satiety due to ascites. But still eating and drinking home. Denies abdominal pain or fever  No JVD. Trace leg edema.      Labs reviewed. Chronic anemia steady. No sign of volume overload but will do CXR. No acidosis. Normal LFTs  Vs- low BP 90-100s on multiple BP regimen, no fever. No tachycardia     Pt was admitted for further management.     Review Of Systems:   Constitutional: Positive for activity change, appetite change and fatigue.   HENT: Negative.    Eyes: Negative.    Respiratory: Positive for shortness of breath.    Cardiovascular: Negative.    Gastrointestinal: Positive for abdominal distention.   Endocrine: Negative.    Genitourinary: Negative.    Musculoskeletal: Positive for arthralgias, gait problem and myalgias.   Skin: Negative.    Allergic/Immunologic: Negative.    Neurological: Positive for weakness.   Hematological: Negative.    Psychiatric/Behavioral: Positive for dysphoric mood.     Objective    Got combative and confused, more hypoxia     Objective    Physical Exam:  Temp:  [96.7 °F (35.9 °C)-98.3 °F (36.8 °C)] 96.7 °F (35.9 °C)  Heart Rate:  [] 93  Resp:  [16-18] 18  BP: (146-180)/() 151/100    Constitutional: He is oriented to person, place, and time. He appears well-developed. No distress.    HENT:   Head: Normocephalic and atraumatic.   Nose: Nose normal.   Mouth/Throat: No oropharyngeal exudate.   Eyes: Conjunctivae and EOM are normal. Pupils are equal, round, and reactive to light.   Neck: Normal range of motion. Neck supple.   Cardiovascular: Normal rate and regular rhythm.   Murmur heard.  Pulmonary/Chest: Effort normal and breath sounds normal. No respiratory distress. He has no wheezes. He has no rales. He exhibits no tenderness.   Abdominal: Soft. Bowel sounds are normal. He exhibits distension, worse due to ascites. There is no tenderness. There is no rebound.   Musculoskeletal: Normal range of motion.   Neurological: He is alert and oriented to person, place, and time. No cranial nerve deficit.   L foot MTA   Skin: Skin is warm and dry.   Very poor pedal pulse in both feet   Psychiatric: He has a normal mood and affect. His behavior is normal. Judgment and thought content normal    Results Review:    I have personally reviewed most recent cardiac tracings, lab results and radiology images and interpretations and agree with findings, most notably:  .      Results from last 7 days   Lab Units 12/10/19  0914 12/10/19  0718 12/10/19  0422 12/09/19  0437 12/08/19  0334   WBC 10*3/mm3  --   --  5.10 6.30 3.20*   HEMOGLOBIN g/dL  --   --  8.2* 8.4* 8.4*   HEMOGLOBIN, POC g/dL  --  10.6*  --   --   --    HEMATOCRIT %  --   --  26.2* 25.8* 25.6*   HEMATOCRIT POC %  --  31*  --   --   --    PLATELETS 10*3/mm3  --   --  50* 94* 106*   INR  1.50*  --   --   --   --      Results from last 7 days   Lab Units 12/10/19  0422 12/09/19  0458 12/08/19  1247 12/08/19  0334   SODIUM mmol/L 142 140  --  134*   POTASSIUM mmol/L 4.4 4.4 5.0 5.6*   CHLORIDE mmol/L 103 100  --  97*   CO2 mmol/L 26.0 25.0  --  21.0*   BUN mg/dL 93* 92*  --  93*   CREATININE mg/dL 2.87* 3.19*  --  3.61*   GLUCOSE mg/dL 141* 145*  --  203*   CALCIUM mg/dL 8.7 8.9  --  8.3*   ALK PHOS U/L 75 82  --  88   ALT (SGPT) U/L 9 10  --  <5   AST  (SGOT) U/L 9 8  --  7     TSH (uIU/mL)   Date/Time Value   12/07/2019 0837 2.780     Microbiology Results Abnormal     Procedure Component Value - Date/Time    Blood Culture - Blood, Arm, Left [[id number]] Collected:  12/08/19 1004    Lab Status:  Preliminary result Specimen:  Blood from Arm, Left Updated:  12/10/19 1053     Blood Culture No growth at 2 days    Blood Culture - Blood, Wrist, Right [[id number]] Collected:  12/08/19 1005    Lab Status:  Preliminary result Specimen:  Blood from Wrist, Right Updated:  12/10/19 1053     Blood Culture No growth at 2 days    Wound Culture - Wound, Leg, Left [[id number]]  (Abnormal)  (Susceptibility) Collected:  12/08/19 1613    Lab Status:  Final result Specimen:  Wound from Leg, Left Updated:  12/10/19 0812     Wound Culture Moderate growth (3+) Staphylococcus aureus, MRSA     Comment:   Methicillin resistant Staphylococcus aureus, Patient may be an isolation risk.        Gram Stain Few (2+) WBCs observed - predominantly neutrophils      Occasional Gram positive cocci    Susceptibility      Staphylococcus aureus, MRSA     PAULA     Clindamycin Susceptible     Erythromycin Resistant     Inducible Clindamycin Resistance Negative     Oxacillin Resistant     Penicillin G Resistant     Rifampin Susceptible     Tetracycline Susceptible     Trimethoprim + Sulfamethoxazole Susceptible     Vancomycin Susceptible                Susceptibility Comments     Staphylococcus aureus, MRSA    This isolate does not demonstrate inducible clindamycin resistance in vitro.               Eosinophil Smear - Urine, Urine, Clean Catch [[id number]]  (Normal) Collected:  12/07/19 1824    Lab Status:  Final result Specimen:  Urine, Clean Catch Updated:  12/07/19 1925     Eosinophil Smear 0 % EOS/100 Cells         No radiology results for the last day         I have reviewed the medications.    Assessment/Plan   Assessment/Plan   Brief Hospital Course:      Active Hospital Problems:  * Acute renal failure  superimposed on chronic kidney disease (CMS/HCC)- (present on admission)  Etiology; prerenal vs renal /possible lupus/MERYL flare up on CKD stage 3 per renal- on pulse dose steroid for 3 days solumedrol- changed to po prednisone  Recently completed zithromax for 5 days for URI sx  Non oliguric per pt  Urine lyte   Hypotensive- on multiple regimen home, hold amlodipine, clonidine but continue on Coreg at low dose  Renal US - no obstruction but mild cortical thinning, echogenic kidney  Fluid and albumin 25g x2    Nephrology on board  Cr trending down on fluid         Other ascites- (present on admission)  S/p 2 paracentesis - last one 8/2019  Getting worse gradually recently   May need paracentesis to avoid compression effect from worsening ascites- denies fever or abdominal pain.no leukocytosis. Not likely SBP.  a few dose of albumin  worse in ascites since admission, possibly due to fluid - paracentesis by IR, GI consult  Also contributing hypoxia       Hyperkalemia- (present on admission)  Due to renal failure  Receive multiple regimen by renal  resolved    Chronic pain disorder- (present on admission)  On subotex due to addiction issue initially but due to pain control also since he had 2 major lung surgery  Followed by pain clinic- oxy was discontinued by some one else. Continues to c/o uncontrolled pain. But got only once per day since admission in addition to Subotex     Re-Consult to pain clinic   Watch for constipation for opiate related  Reported taking his own pain med in bag pack- taken away yesterday- became confused and combative this morning    Anemia, chronic disease- (present on admission)  Due to mild JUNG and ACD  Followed by Dr. Mak for CAPS   Iv and po iron and procrit per oncology  B12 supplement    Amputation of foot (CMS/HCC)  Due to arterial clot in LLE     Acute on chronic respiratory failure with hypoxia and hypercapnia (CMS/HCC)- (present on admission)  Wearing O2, ryann desat at  night  Etiology - due to lobectomy of lung or other  nonsmoker   More hypoxic today with mild hypercarbia- placed on bipap  Get cxr for possible volume overload since was on fluid since admission  Check BNP  Getting bumex for possible volume overload    SLE (systemic lupus erythematosus) (CMS/HCC)- (present on admission)  Not on specific med     Deep vein thrombosis (DVT) of lower extremity (CMS/HCC)- (present on admission)  On Arixtra   S/p IVC filter    Benign essential hypertension- (present on admission)  On clonidine, coreg and amlodipine and bumex home - held except low dose coreg  bp improved, on midodrine also - stop since BP trending up    Delirium, drug-induced (CMS/HCC)  Suspicious for withdrawal  UDS ordered     Secondary hyperparathyroidism (CMS/HCC)- (present on admission)   Due to CKD    Venous stasis ulcer of left calf limited to breakdown of skin without varicose veins (CMS/HCC)- (present on admission)  Seen by ID  Zosyn discontinued  Wound cx pending  Applied bactroban cream by ID     PVD (peripheral vascular disease) (CMS/HCC)- (present on admission)  Got clot on LLE at the age 14, s/p bypass surgery     Hepatic cirrhosis (CMS/HCC)- (present on admission)  Etiology- unclear, possibly related to lupus per pt   Normal LFTs    CAPS (catastrophic antiphospholipid syndrome) (CMS/HCC)- (present on admission)  Multiple recurrent PE and DVT -s/p lobectomy, both lung due to clots    S/p plasmapheresis, iv IG, high dose steroid, this year   On arixtra- initially coumadin and lovenox  Dr. Mak - hematologist  Not following with rheumatology          DVT prophylaxis: arixtra  Discharge Planning: I expect patient to be discharged to home in a few days.    Pretty Sevilla MD, 12/08/19 7:10 AM

## 2019-12-10 NOTE — SIGNIFICANT NOTE
Patient awoke in middle of night combative with staff, pulled IV out stated that we had done it. Opioid medications were stopped today. Patient appears to with drawing

## 2019-12-10 NOTE — PROGRESS NOTES
Infectious Diseases Progress Note      LOS: 3 days   Patient Care Team:  Lori Hale NP as PCP - General (Family Medicine)  Lori Hale NP as PCP - Claims Attributed  Lori Hale NP as PCP - Family Medicine  Oma Mak MD as Consulting Physician (Hematology and Oncology)  Pito Goodwin MD as Consulting Physician (Nephrology)    Chief Complaint:  Fatigue, right foot pain, confusion    Subjective       The patient has been afebrile for the last 24 hours.  The patient is on room air, hemodynamically stable, and is tolerating antimicrobial therapy.  Patient apparently became very confused and combative last night and is currently in four-point restraints.      Review of Systems:   Review of Systems   Constitutional: Positive for fatigue.   HENT: Negative.    Respiratory: Negative.    Cardiovascular: Negative.    Gastrointestinal: Positive for abdominal distention.   Genitourinary: Negative.    Musculoskeletal: Negative.         Right foot pain   Skin: Positive for wound.   Neurological: Negative.    Psychiatric/Behavioral: Positive for confusion.        Objective     Vital Signs  Temp:  [96.7 °F (35.9 °C)-98.3 °F (36.8 °C)] 96.7 °F (35.9 °C)  Heart Rate:  [] 93  Resp:  [16-18] 18  BP: (146-180)/() 151/100    Physical Exam:  Physical Exam   Constitutional: He appears well-developed and well-nourished.   HENT:   Head: Normocephalic and atraumatic.   Eyes: Pupils are equal, round, and reactive to light. Conjunctivae and EOM are normal.   Neck: Neck supple.   Cardiovascular: Normal rate, regular rhythm and normal heart sounds.   Pulmonary/Chest: Effort normal and breath sounds normal.   Abdominal: Soft. Bowel sounds are normal. He exhibits distension.   Musculoskeletal: Normal range of motion.   Neurological: He is alert.   Patient appears very lethargic, alert and oriented x2   Skin: Skin is warm and dry.   Chronic venous stasis of both lower extremities.  On the left  anterior shin patient has superficial wound with minimal drainage with no significant surrounding erythema.   Vitals reviewed.       Results Review:    I have reviewed all clinical data, test, lab, and imaging results.     Radiology  No Radiology Exams Resulted Within Past 24 Hours    Cardiology    Laboratory  Results from last 7 days   Lab Units 12/10/19  0718 12/10/19  0422   WBC 10*3/mm3  --  5.10   HEMOGLOBIN g/dL  --  8.2*   HEMOGLOBIN, POC g/dL 10.6*  --    HEMATOCRIT %  --  26.2*   HEMATOCRIT POC % 31*  --    PLATELETS 10*3/mm3  --  50*     Results from last 7 days   Lab Units 12/10/19  0422   SODIUM mmol/L 142   POTASSIUM mmol/L 4.4   CHLORIDE mmol/L 103   CO2 mmol/L 26.0   BUN mg/dL 93*   CREATININE mg/dL 2.87*   GLUCOSE mg/dL 141*   CALCIUM mg/dL 8.7     Results from last 7 days   Lab Units 12/10/19  0422   SODIUM mmol/L 142   POTASSIUM mmol/L 4.4   CHLORIDE mmol/L 103   CO2 mmol/L 26.0   BUN mg/dL 93*   CREATININE mg/dL 2.87*   GLUCOSE mg/dL 141*   CALCIUM mg/dL 8.7     Results from last 7 days   Lab Units 12/07/19  0721   CK TOTAL U/L 12*             Microbiology   Microbiology Results (last 10 days)     Procedure Component Value - Date/Time    Wound Culture - Wound, Leg, Left [[id number]]  (Abnormal)  (Susceptibility) Collected:  12/08/19 1613    Lab Status:  Final result Specimen:  Wound from Leg, Left Updated:  12/10/19 0812     Wound Culture Moderate growth (3+) Staphylococcus aureus, MRSA     Comment:   Methicillin resistant Staphylococcus aureus, Patient may be an isolation risk.        Gram Stain Few (2+) WBCs observed - predominantly neutrophils      Occasional Gram positive cocci    Susceptibility      Staphylococcus aureus, MRSA     PAULA     Clindamycin Susceptible     Erythromycin Resistant     Inducible Clindamycin Resistance Negative     Oxacillin Resistant     Penicillin G Resistant     Rifampin Susceptible     Tetracycline Susceptible     Trimethoprim + Sulfamethoxazole Susceptible      Vancomycin Susceptible                Susceptibility Comments     Staphylococcus aureus, MRSA    This isolate does not demonstrate inducible clindamycin resistance in vitro.               Blood Culture - Blood, Wrist, Right [[id number]] Collected:  12/08/19 1005    Lab Status:  Preliminary result Specimen:  Blood from Wrist, Right Updated:  12/10/19 1053     Blood Culture No growth at 2 days    Blood Culture - Blood, Arm, Left [[id number]] Collected:  12/08/19 1004    Lab Status:  Preliminary result Specimen:  Blood from Arm, Left Updated:  12/10/19 1053     Blood Culture No growth at 2 days    Eosinophil Smear - Urine, Urine, Clean Catch [[id number]]  (Normal) Collected:  12/07/19 1824    Lab Status:  Final result Specimen:  Urine, Clean Catch Updated:  12/07/19 1925     Eosinophil Smear 0 % EOS/100 Cells           Medication Review:       Schedule Meds    buprenorphine 20 mg Sublingual Daily   calcium gluconate 1 g Intravenous Once   calcium-vitamin D 1 tablet Oral BID   carvedilol 3.125 mg Oral BID With Meals   clonazePAM 0.5 mg Oral BID   epoetin candy/candy-epbx 20,000 Units Subcutaneous Q14 Days   febuxostat 40 mg Oral Daily   folic acid 1 mg Oral Daily   fondaparinux 7.5 mg Subcutaneous Daily   gabapentin 300 mg Oral Q8H   insulin lispro 0-7 Units Subcutaneous 4x Daily With Meals & Nightly   lacosamide 100 mg Oral BID   lactulose 20 g Oral TID   levETIRAcetam 500 mg Oral Q12H   methylPREDNISolone sodium succinate 60 mg Intravenous Daily   mupirocin  Topical Q12H   rifAXIMin 550 mg Oral Q12H   sertraline 50 mg Oral Daily   sodium bicarbonate 1,300 mg Oral TID   sodium chloride 10 mL Intravenous Q12H   vitamin B-12 250 mcg Oral Daily       Infusion Meds       PRN Meds  •  baclofen  •  bisacodyl  •  bisacodyl  •  calcium carbonate  •  dextrose  •  dextrose  •  famotidine  •  glucagon (human recombinant)  •  insulin lispro **AND** insulin lispro  •  ketamine (KETALAR) infusion **AND** Ketamine Vital Signs &  Assessment  •  melatonin  •  nitroglycerin  •  ondansetron **OR** ondansetron  •  prochlorperazine  •  QUEtiapine  •  rOPINIRole  •  [COMPLETED] Insert peripheral IV **AND** sodium chloride  •  sodium chloride        Assessment/Plan       Antimicrobial Therapy   1.  P.o. doxycycline    Day 1  2.      Day  3.      Day  4.      Day  5.      Day      Assessment     Left lower extremity venous stasis wound with possible mild infection if any.  -Culture grew methicillin resistant Staphylococcus aureus, although the wound is very superficial     Chronic kidney disease with acute kidney failure on the top of that.  Most likely secondary to lupus nephritis     History of lupus anticoagulant.  Patient has thrombosis of the lower extremities in the past and has left foot transmetatarsal amputation     Jimenez    Acute confusion and combativeness     Plan    Bactroban ointment twice daily on the left leg wound  Start p.o. doxycycline 100 mg twice daily for 7 days  Wound care  Continue supportive care  A.mBrandy Lance, LESLYE  12/10/19  11:34 AM

## 2019-12-10 NOTE — PROGRESS NOTES
"NEPHROLOGY PROGRESS NOTE------[facility]    [facility]  [phone]  Jorge Luis Goodwin MD      Patient Care Team:  Lori Hale NP as PCP - General (Family Medicine)  Lori Hale NP as PCP - Claims Attributed  Lori Hale NP as PCP - Family Medicine  Oma Mak MD as Consulting Physician (Hematology and Oncology)  Pito Goodwin MD as Consulting Physician (Nephrology)      Provider:  Jorge Luis Goodwin MD  Patient Name: Lorenzo Crockett  :  1980    SUBJECTIVE:    Patient s/p fast team call this AM with SOB and confusion. On BiPap. Lethargic but arousable    Medication:    buprenorphine 20 mg Sublingual Daily   calcium acetate 667 mg Oral TID With Meals   calcium gluconate 1 g Intravenous Once   calcium-vitamin D 1 tablet Oral BID   carvedilol 3.125 mg Oral BID With Meals   clonazePAM 0.5 mg Oral BID   epoetin candy/candy-epbx 20,000 Units Subcutaneous Q14 Days   febuxostat 40 mg Oral Daily   folic acid 1 mg Oral Daily   fondaparinux 7.5 mg Subcutaneous Daily   gabapentin 300 mg Oral Q8H   insulin lispro 0-7 Units Subcutaneous 4x Daily With Meals & Nightly   lacosamide 100 mg Oral BID   lactulose 20 g Oral TID   levETIRAcetam 500 mg Oral Q12H   mupirocin  Topical Q12H   predniSONE 40 mg Oral Daily With Breakfast   sertraline 50 mg Oral Daily   sodium bicarbonate 1,300 mg Oral TID   sodium chloride 10 mL Intravenous Q12H   vitamin B-12 250 mcg Oral Daily       sodium chloride 75 mL/hr Last Rate: 75 mL/hr (19 1728)       OBJECTIVE    Vital Sign Min/Max for last 24 hours  Temp  Min: 97.4 °F (36.3 °C)  Max: 98.3 °F (36.8 °C)   BP  Min: 146/87  Max: 160/98   Pulse  Min: 90  Max: 102   Resp  Min: 16  Max: 18   SpO2  Min: 93 %  Max: 98 %   No data recorded   Weight  Min: 90.9 kg (200 lb 6.4 oz)  Max: 90.9 kg (200 lb 6.4 oz)     Flowsheet Rows      First Filed Value   Admission Height  185.4 cm (73\") Documented at 2019 1422 "   Admission Weight  84.5 kg (186 lb 4.6 oz) Documented at 12/06/2019 1422          No intake/output data recorded.  I/O last 3 completed shifts:  In: 2719 [P.O.:860; I.V.:1859]  Out: 2475 [Urine:2475]    Physical Exam:  General Appearance: LETHARGIC BUT OPENS EYES TO PAIN. ON BIPAP  Head: normocephalic, without obvious abnormality and atraumatic  Eyes: conjunctivae and sclerae normal and no icterus  Neck: supple. +MILD JVD  Lungs: DECREASED BS BIBASILAR WITH FEW SCATTERED RHONCHI WITH FINE L CRACKLES  Heart: regular rhythm & normal rate and normal S1, S2 +DEDE  Chest: Wall no abnormalities observed  Abdomen: normal bowel sounds and soft non-tender +MILD ASCITES  Extremities: moves extremities well, no edema, no cyanosis and no redness  Skin: +LE WOUND  Neurologic: AROUSABLE    Labs:    WBC WBC   Date Value Ref Range Status   12/10/2019 5.10 3.40 - 10.80 10*3/mm3 Final   12/09/2019 6.30 3.40 - 10.80 10*3/mm3 Final   12/08/2019 3.20 (L) 3.40 - 10.80 10*3/mm3 Final      HGB Hemoglobin   Date Value Ref Range Status   12/10/2019 8.2 (L) 13.0 - 17.7 g/dL Final   12/09/2019 8.4 (L) 13.0 - 17.7 g/dL Final   12/08/2019 8.4 (L) 13.0 - 17.7 g/dL Final      HCT Hematocrit   Date Value Ref Range Status   12/10/2019 26.2 (L) 37.5 - 51.0 % Final   12/09/2019 25.8 (L) 37.5 - 51.0 % Final   12/08/2019 25.6 (L) 37.5 - 51.0 % Final      Platlets No results found for: LABPLAT   MCV MCV   Date Value Ref Range Status   12/10/2019 92.7 79.0 - 97.0 fL Final   12/09/2019 90.9 79.0 - 97.0 fL Final   12/08/2019 87.4 79.0 - 97.0 fL Final     Comment:     Result checked           Sodium Sodium   Date Value Ref Range Status   12/10/2019 142 136 - 145 mmol/L Final   12/09/2019 140 136 - 145 mmol/L Final   12/08/2019 134 (L) 136 - 145 mmol/L Final      Potassium Potassium   Date Value Ref Range Status   12/10/2019 4.4 3.5 - 5.2 mmol/L Final   12/09/2019 4.4 3.5 - 5.2 mmol/L Final   12/08/2019 5.0 3.5 - 5.2 mmol/L Final   12/08/2019 5.6 (H) 3.5 -  5.2 mmol/L Final      Chloride Chloride   Date Value Ref Range Status   12/10/2019 103 98 - 107 mmol/L Final   12/09/2019 100 98 - 107 mmol/L Final   12/08/2019 97 (L) 98 - 107 mmol/L Final      CO2 CO2   Date Value Ref Range Status   12/10/2019 26.0 22.0 - 29.0 mmol/L Final   12/09/2019 25.0 22.0 - 29.0 mmol/L Final   12/08/2019 21.0 (L) 22.0 - 29.0 mmol/L Final      BUN BUN   Date Value Ref Range Status   12/10/2019 93 (H) 6 - 20 mg/dL Final   12/09/2019 92 (H) 6 - 20 mg/dL Final   12/08/2019 93 (H) 6 - 20 mg/dL Final      Creatinine Creatinine   Date Value Ref Range Status   12/10/2019 2.87 (H) 0.76 - 1.27 mg/dL Final   12/09/2019 3.19 (H) 0.76 - 1.27 mg/dL Final   12/08/2019 3.61 (H) 0.76 - 1.27 mg/dL Final      Calcium Calcium   Date Value Ref Range Status   12/10/2019 8.7 8.6 - 10.5 mg/dL Final   12/09/2019 8.9 8.6 - 10.5 mg/dL Final   12/08/2019 8.3 (L) 8.6 - 10.5 mg/dL Final      PO4 No components found for: PO4   Albumin Albumin   Date Value Ref Range Status   12/10/2019 3.70 3.50 - 5.20 g/dL Final   12/09/2019 3.70 3.50 - 5.20 g/dL Final   12/08/2019 3.80 3.50 - 5.20 g/dL Final      Magnesium Magnesium   Date Value Ref Range Status   12/10/2019 2.7 (H) 1.6 - 2.6 mg/dL Final   12/09/2019 2.8 (H) 1.6 - 2.6 mg/dL Final   12/08/2019 3.0 (H) 1.6 - 2.6 mg/dL Final      Uric Acid No components found for: URIC ACID     Imaging Results (Last 72 Hours)     ** No results found for the last 72 hours. **          Results for orders placed during the hospital encounter of 12/06/19   XR Chest 1 View    Narrative XR CHEST 1 VW-     Date of Exam: 12/6/2019 6:55 PM     Indication: sob; N17.9-Acute kidney failure, unspecified; N18.9-Chronic  kidney disease, unspecified; D64.9-Anemia, unspecified  39-year-old  history of lupus, on home oxygen, anemia     Comparison: 08/21/2019, 03/29/2019     Technique: 1 view(s) of the chest were obtained.     FINDINGS: There is still volume loss in the right chest which is  likely  postsurgical. There are also sternotomy wires and postsurgical  changes in the mediastinum. Alveolar and interstitial opacities  bilaterally are present similar in appearance and distribution to  8/21/2019 and 3/29/2019. No pneumothorax or definite pleural effusion.  There is calcification along the right diaphragm which is unchanged.  Trachea is midline. No definite pleural effusions.       Impression There are postsurgical changes in the right chest as well as sternotomy  wires and surgical clips in the mediastinum. No significant change in  alveolar and airspace opacities bilaterally which may be chronic or due  to recurrent infection.        Electronically Signed By-Ygoesh Salmeron DO. On:12/6/2019 8:18 PM  This report was finalized on [id number] by  Yogesh Salmeron DO..              ASSESSMENT / PLAN      Acute renal failure superimposed on chronic kidney disease (CMS/HCC)    CAPS (catastrophic antiphospholipid syndrome) (CMS/HCC)    Benign essential hypertension    Chronic pain disorder    Deep vein thrombosis (DVT) of lower extremity (CMS/HCC)    Hepatic cirrhosis (CMS/HCC)    PVD (peripheral vascular disease) (CMS/HCC)    SLE (systemic lupus erythematosus) (CMS/HCC)    Venous stasis ulcer of left calf limited to breakdown of skin without varicose veins (CMS/HCC)    Other ascites    Anemia, chronic disease    Hyperkalemia    Secondary hyperparathyroidism (CMS/HCC)      1. ARF/TABBY/CRF/CKD STG 3---- Nonoliguric. Creatinine down a little. +ARF/TABBY on top of known CRF/CKD stage 3, with a baseline serum creatinine of around 1.6-1.7. CRF/CKD STG 3 is secondary to HTN NS/SLE/history of biopsy proven MERYL which may be recurrent. Renal US ok. +ARF/TABBY appears to be secondary to prerenal state and occasional NSAID use and likely recurrent MERYL. No obstructive uropathy on US. D/C IVFs given hypoxia. Will actually give patient a little Bumex this AM. No NSAIDs. No IV dye unless urgently needed.  Dose medsfor CrCL<10  cc/min. Follow for dialysis need. No obvious uremia at present. Patient refuses renal biopsy and would be high risk given thrombocytopenia anyway     2. PERSISTENT PROTEINURIA---Quantify. 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  Patient had Refused biopsy and not likely to be beneficial at present.     3. HISTORY OF LUPUS NEPHRITIS---S/P 3 days of pulse steroids. Since unable to take po will keep on 60mg IV Solumedrol for now.     4. HISTORY OF MINIMAL CHANGE DISEASE--- quantify proteinuria.  Steroids for likely relapse     5. HTN WITH CKD--- BP ok. No ACE/ARB/DRI     6. HYPOALBUMINEMIA--- secondary to proteinuria and poor PO intake. S/P IV Albumin to temporize     7. ANEMIA OF CKD--- Venofer for JUNG. Follow for PRBC need. EPO started     8. HYPONATREMIA-- Resolved. Follow with little diuretic exposure this AM     9. HYPOCALCEMIA--- Replaced IV.      10. HYPERKALEMIA--- Resolved. Medically treated with Rx with Kayexalate, bicarb, IV Humulin R (without D50 b/c sugars high), and CaGluconate. On K+ restriction in diet. On telemetry. Follow levels closely     11. CUMMINS---- Follow ascites for paracentesis need     12. MEDICAL NONCOMPLIANCE      13. PANCYTOPENIA-------?recurrent autoimmune thrombocytopenia. Last hospitalization, patient required plasmapheresis. , Hem/Onc to see    14. ACIDOSIS-------Metabolic. No elevation in AGAP. +Type 4 RTA. Continue po NaHCO3    15. RF ASSOCIATED HYPERPHOSPHATEMIA------Better. D/C Phoslo    16. SECONDARY HYPERPARATHYROIDISM    17. ANTIPHOSPHOLIPID SYNDROME/HISTORY OF PE---status post IVC filter.  Currently on Arixtra.    18. DELERIUM-------Check CT head and ammonia level    Jorge Luis Goodwin MD  [facility]  [phone]  12/10/19  7:21 AM

## 2019-12-10 NOTE — PROGRESS NOTES
Hematology/Oncology Inpatient Progress Note    PATIENT NAME: Lorenzo Crockett  : 1980  MRN: [id number]    CHIEF COMPLAINT: Pancytopenia    HISTORY OF PRESENT ILLNESS:    39 y.o. male admitted through the ED 2019 after he was instructed to go by the cancer center with abnormal elevation of potassium (5.5) on outpatient labs 2019.  The patient had been instructed to go to the ED 2019.  He reported worsening abdominal distention.  Repeat potassium level was normal (5.0) but creatinine level was rising (3.92) from baseline.  Nephrology was consulted and the patient has known CKD secondary to SLE/MERYL.  LFTs were okay.  CBC showed WBC 6.6, hemoglobin 8.6, MCV 90.7, and platelets 118,000.  Chest x-ray showed no change in alveolar and airspace opacities bilaterally as compared with 2019.  Renal ultrasound was negative for hydronephrosis and showed markedly echogenic kidneys with mild cortical thickening and moderate ascites. On 19 he was started on daily Solu-Medrol. On 2019 CBC showed WBC 3.2, hemoglobin 8.4, and platelets 106,000.      19  Hematology/Oncology was consulted as the patient is known to our service for thrombocytopenia and thrombophilia.  He was initially evaluated by Dr. Mak in .  He was anemic at that time and diagnosed with JUNG.  Thrombocytopenia was felt due to medication (risperidone) and concern for bone marrow suppression secondary to lupus treatment.  He was also noted at that time to have bulky adenopathy on CT scans with PET ordered but not completed due to patient noncompliance.  He was lost to follow-up and seen again in 2017 for thrombocytopenia during hospital admission.  Thrombocytopenia stabilized and no etiology was detected.  He had a history of positive lupus anticoagulant and was continued on Arixtra and had undergone an IVC filter placement at some point.  Anemia again was felt due to JUNG and ACD.  He was lost to follow-up  again until consulted during an additional hospitalization in 2019.  He had been evaluated by rheumatology and diagnosed with catastrophic antiphospholipid syndrome treated with plasma exchange x5 and high-dose steroids along with a brief period of dialysis.  On 3/11/2019 hematologic parameters including haptoglobin, normal at 64.  .  Ferritin 32.  Complement C3, C4 was low.  Serum iron was low at 27.  .  Viral hepatitis panel was nonreactive.  B12 was more than 1,500.  Folate was more than 24.  He received IV IgG 40 g on 4/4, 4/5, and 4/8/2019.  He received Injectafer x2 doses with last given 4/15/2019.   He was last seen as an outpatient on 12/4/2019 where CBC revealed WBC 6.49, hemoglobin 8.4, and platelets 89,000.  He complained of chest pain and oxygen saturation falling into the 60s when off home oxygen and was encouraged to go to the ED.  For his thrombophilia he was noted to have had an IVC filter placed with most recent anticoagulation of Arixtra 7.5 mg subcu daily. Thrombocytopenia was felt likely secondary to ITP and underlying autoimmune disease with baseline platelet count 100,000. For his multifactorial anemia including JUNG he was encouraged to see GI and plans were to continue as needed Injectafer with the addition of Procrit if needed secondary to CKD.  Vitamin B12 deficiency was noted in August 2019.  He was on oral replacement and this was changed to injectable.  On 12/4/2019 iron studies showed iron 33 (), and iron saturation 13% (20-50), TIBC 252 (298-536), and ferritin 279 ().     PCP: Lori Hale NP    Subjective Complains about pain mostly in the back    ROS:  Review of Systems   Constitutional: Negative for chills and fever.   HENT: Negative for ear pain, mouth sores, nosebleeds and sore throat.    Eyes: Negative for photophobia and visual disturbance.   Respiratory: Negative for wheezing and stridor.    Cardiovascular: Negative for chest pain and palpitations.    Gastrointestinal: Negative for abdominal pain, diarrhea, nausea and vomiting.   Endocrine: Negative for cold intolerance and heat intolerance.   Genitourinary: Negative for dysuria and hematuria.   Musculoskeletal: Negative for joint swelling and neck stiffness.   Skin: Negative for color change and rash.   Neurological: Negative for seizures and syncope.   Hematological: Negative for adenopathy.        No obvious bleeding   Psychiatric/Behavioral: Negative for agitation, confusion and hallucinations.        MEDICATIONS:    Scheduled Meds:      buprenorphine 20 mg Sublingual Daily   calcium gluconate 1 g Intravenous Once   calcium-vitamin D 1 tablet Oral BID   carvedilol 3.125 mg Oral BID With Meals   clonazePAM 0.5 mg Oral BID   epoetin candy/candy-epbx 20,000 Units Subcutaneous Q14 Days   febuxostat 40 mg Oral Daily   folic acid 1 mg Oral Daily   fondaparinux 7.5 mg Subcutaneous Daily   gabapentin 300 mg Oral Q8H   insulin lispro 0-7 Units Subcutaneous 4x Daily With Meals & Nightly   lacosamide 100 mg Oral BID   lactulose 20 g Oral TID   levETIRAcetam 500 mg Oral Q12H   methylPREDNISolone sodium succinate 60 mg Intravenous Daily   mupirocin  Topical Q12H   rifAXIMin 550 mg Oral Q12H   sertraline 50 mg Oral Daily   sodium bicarbonate 1,300 mg Oral TID   sodium chloride 10 mL Intravenous Q12H   vitamin B-12 250 mcg Oral Daily      Continuous Infusions:        PRN Meds:  •  baclofen  •  bisacodyl  •  bisacodyl  •  calcium carbonate  •  dextrose  •  dextrose  •  famotidine  •  glucagon (human recombinant)  •  insulin lispro **AND** insulin lispro  •  ketamine (KETALAR) infusion **AND** Ketamine Vital Signs & Assessment  •  melatonin  •  nitroglycerin  •  ondansetron **OR** ondansetron  •  prochlorperazine  •  QUEtiapine  •  rOPINIRole  •  [COMPLETED] Insert peripheral IV **AND** sodium chloride  •  sodium chloride     ALLERGIES:    Allergies   Allergen Reactions   • Tramadol Other (See Comments)     seizure   • Melon  "Rash       Objective    VITALS:   /100 (BP Location: Left arm, Patient Position: Lying)   Pulse 93   Temp 96.7 °F (35.9 °C) (Axillary)   Resp 18   Ht 185.4 cm (73\")   Wt 90.9 kg (200 lb 6.4 oz)   SpO2 (!) 88%   BMI 26.44 kg/m²     PHYSICAL EXAM:  Physical Exam   Constitutional: He is oriented to person, place, and time. No distress.   Frail   HENT:   Head: Normocephalic and atraumatic.   Eyes: Conjunctivae and EOM are normal. Right eye exhibits no discharge. Left eye exhibits no discharge. No scleral icterus.   Neck: Normal range of motion. Neck supple. No thyromegaly present.   Cardiovascular: Normal rate, regular rhythm and normal heart sounds. Exam reveals no gallop and no friction rub.   Pulmonary/Chest: Effort normal. No stridor. No respiratory distress. He has no wheezes.   Diminished breath sounds bilaterally   Abdominal: Soft. Bowel sounds are normal. He exhibits no mass. There is no tenderness. There is no rebound and no guarding.   Musculoskeletal: Normal range of motion. He exhibits deformity ( History of LLE partial amputation). He exhibits no tenderness.   Lymphadenopathy:     He has no cervical adenopathy.   Neurological: He is alert and oriented to person, place, and time. He exhibits normal muscle tone.   Skin: Skin is warm. No rash noted. He is not diaphoretic. No erythema.   Psychiatric: He has a normal mood and affect. His behavior is normal.   Nursing note and vitals reviewed.        RECENT LABS:  Lab Results (last 24 hours)     Procedure Component Value Units Date/Time    BNP [[id number]] Collected:  12/10/19 0422    Specimen:  Blood Updated:  12/10/19 1245    Urine Drug Screen - Urine, Clean Catch [[id number]] Collected:  12/10/19 1220    Specimen:  Urine, Clean Catch Updated:  12/10/19 1241    Ferritin [[id number]]  (Normal) Collected:  12/10/19 0422    Specimen:  Blood Updated:  12/10/19 1151     Ferritin 383.10 ng/mL     Iron [[id number]]  (Normal) Collected:  12/10/19 0422    " Specimen:  Blood Updated:  12/10/19 1144     Iron 132 mcg/dL     Gamma GT [[id number]] Collected:  12/10/19 0422    Specimen:  Blood Updated:  12/10/19 1118    Ceruloplasmin [[id number]] Collected:  12/10/19 0422    Specimen:  Blood Updated:  12/10/19 1117    Blood Culture - Blood, Arm, Left [[id number]] Collected:  12/08/19 1004    Specimen:  Blood from Arm, Left Updated:  12/10/19 1053     Blood Culture No growth at 2 days    Blood Culture - Blood, Wrist, Right [[id number]] Collected:  12/08/19 1005    Specimen:  Blood from Wrist, Right Updated:  12/10/19 1053     Blood Culture No growth at 2 days    Ammonia [[id number]]  (Normal) Collected:  12/10/19 0858    Specimen:  Blood Updated:  12/10/19 0936     Ammonia 54 umol/L     Protime-INR [[id number]]  (Abnormal) Collected:  12/10/19 0914    Specimen:  Blood Updated:  12/10/19 0925     Protime 14.8 Seconds      INR 1.50    Wound Culture - Wound, Leg, Left [[id number]]  (Abnormal)  (Susceptibility) Collected:  12/08/19 1613    Specimen:  Wound from Leg, Left Updated:  12/10/19 0812     Wound Culture Moderate growth (3+) Staphylococcus aureus, MRSA     Comment:   Methicillin resistant Staphylococcus aureus, Patient may be an isolation risk.        Gram Stain Few (2+) WBCs observed - predominantly neutrophils      Occasional Gram positive cocci    Susceptibility      Staphylococcus aureus, MRSA     PAULA     Clindamycin Susceptible     Erythromycin Resistant     Inducible Clindamycin Resistance Negative     Oxacillin Resistant     Penicillin G Resistant     Rifampin Susceptible     Tetracycline Susceptible     Trimethoprim + Sulfamethoxazole Susceptible     Vancomycin Susceptible                Susceptibility Comments     Staphylococcus aureus, MRSA    This isolate does not demonstrate inducible clindamycin resistance in vitro.               POCT Electrolytes +HGB +HCT [[id number]]  (Abnormal) Collected:  12/10/19 0718    Specimen:  Blood Updated:  12/10/19 0722     Sodium 140  mmol/L      Potassium 4.1 mmol/L      Ionized Calcium 1.16 mmol/L      Comment: Serial Number: [id number]:  [id number]        Glucose 144 mg/dL      Hematocrit 31 %      Hemoglobin 10.6 g/dL     Blood Gas, Arterial [[id number]]  (Abnormal) Collected:  12/10/19 0718    Specimen:  Arterial Blood Updated:  12/10/19 0722     Site Right Brachial     Vivek's Test N/A     pH, Arterial 7.226 pH units      pCO2, Arterial 58.4 mm Hg      pO2, Arterial 60.4 mm Hg      HCO3, Arterial 24.2 mmol/L      Base Excess, Arterial -3.8 mmol/L      Comment: Serial Number: [id number]:  [id number]        O2 Saturation, Arterial 85.0 %      CO2 Content 26.0 mmol/L      Barometric Pressure for Blood Gas --     Comment: N/A        Modality Cannula     FIO2 44 %      Hemodilution No    POC Lactate [[id number]] Collected:  12/10/19 0718    Specimen:  Blood Updated:  12/10/19 0722    POC Glucose Once [[id number]]  (Abnormal) Collected:  12/10/19 0705    Specimen:  Blood Updated:  12/10/19 0707     Glucose 130 mg/dL      Comment: Serial Number: [id number]:  [id number]       Uric Acid [[id number]]  (Normal) Collected:  12/10/19 0422    Specimen:  Blood Updated:  12/10/19 0532     Uric Acid 6.7 mg/dL     Phosphorus [[id number]]  (Abnormal) Collected:  12/10/19 0422    Specimen:  Blood Updated:  12/10/19 0532     Phosphorus 4.6 mg/dL     Comprehensive Metabolic Panel [[id number]]  (Abnormal) Collected:  12/10/19 0422    Specimen:  Blood Updated:  12/10/19 0531     Glucose 141 mg/dL      BUN 93 mg/dL      Creatinine 2.87 mg/dL      Sodium 142 mmol/L      Potassium 4.4 mmol/L      Chloride 103 mmol/L      CO2 26.0 mmol/L      Calcium 8.7 mg/dL      Total Protein 6.5 g/dL      Albumin 3.70 g/dL      ALT (SGPT) 9 U/L      AST (SGOT) 9 U/L      Alkaline Phosphatase 75 U/L      Total Bilirubin 0.2 mg/dL      eGFR Non African Amer 25 mL/min/1.73      Globulin 2.8 gm/dL      A/G Ratio 1.3 g/dL      BUN/Creatinine Ratio 32.4     Anion Gap 13.0 mmol/L      Narrative:       GFR Normal >60  Chronic Kidney Disease <60  Kidney Failure <15      Magnesium [[id number]]  (Abnormal) Collected:  12/10/19 0422    Specimen:  Blood Updated:  12/10/19 0531     Magnesium 2.7 mg/dL     Calcium, Ionized [[id number]]  (Normal) Collected:  12/10/19 0422    Specimen:  Blood Updated:  12/10/19 0519     Ionized Calcium 1.20 mmol/L     CBC (No Diff) [[id number]]  (Abnormal) Collected:  12/10/19 0422    Specimen:  Blood Updated:  12/10/19 0519     WBC 5.10 10*3/mm3      RBC 2.83 10*6/mm3      Hemoglobin 8.2 g/dL      Hematocrit 26.2 %      MCV 92.7 fL      MCH 28.9 pg      MCHC 31.2 g/dL      RDW 16.5 %      RDW-SD 54.3 fl      MPV 8.3 fL      Platelets 50 10*3/mm3     POC Glucose Once [[id number]]  (Abnormal) Collected:  12/09/19 1926    Specimen:  Blood Updated:  12/09/19 1927     Glucose 206 mg/dL      Comment: Serial Number: [id number]:  [id number]       Protein / Creatinine Ratio, Urine - Urine, Clean Catch [[id number]]  (Abnormal) Collected:  12/07/19 1824    Specimen:  Urine, Clean Catch Updated:  12/09/19 1727     Protein/Creatinine Ratio, Urine 2,320.2 mg/G Crea      Creatinine, Urine 98.7 mg/dL      Total Protein, Urine 229.0 mg/dL     POC Glucose Once [[id number]]  (Abnormal) Collected:  12/09/19 1641    Specimen:  Blood Updated:  12/09/19 1647     Glucose 164 mg/dL      Comment: Serial Number: [id number]:  [id number]             PENDING RESULTS: n/a    IMAGING REVIEWED:  No radiology results for the last day    I have reviewed the remained stable at 94,000 labs, imaging, reports, and other clinician documentation.    Assessment/Plan   ASSESSMENT:  1. Pancytopenia/chronic thrombocytopenia (possible ITP)/JUNG and ACD secondary to CKD/vitamin B12 deficiency- s/p Venofer and on Procrit as inpatient.  On oral B12- injection x1 ordered.  WBC with mild drop post admission possibly related to antibiotics.    Platelets fluctuating some.  With ITP could use IV IgG however this is not  available due to backorder and Nplate could be considered but there is concern for clotting with this medication given patient's history.  2. Thrombophilia/antiphospholipid syndrome/history of PE- status post IVC filter.  Currently on Arixtra.  3. TABBY on CKD stage III/proteinuria/electrolyte imbalances-Dr. Goodwin managing with TABBY felt secondary to prerenal state and occasional NSAID use and concern for recurrent minimal-change disease. On solumedrol 250mg IV daily.  Patient declining renal biopsy.  4. History of minimal-change disease/SLE- chronic SLE.  MCV treated in past with plasma exchange and high-dose steroids.   5. LLE wound - MRSA positive.  Per ID.  6. CUMMINS/ascites- last paracentesis August 2019.  Albumin low.  LFTs ok.  GI consulted by primary team.  7. Hypoxia/chronic respiratory failure-oxygen dependent. On Zosyn per primary team.    PLAN:  1. IV IgG unavailable due to backorder and hesitant to use N-plate with risk of clotting given antiphospholipid syndrome.   2. Consult rheumatology.  3. Continue Solu-Medrol.  4. Continue Arixtra and Procrit/Retacrit.  5. Daily CBC.  6. Continue Vitamin B12 1000 mcg IM monthly as outpatient.          Note updated by MARQUISE Santizo.  Patient seen and examined by Dr. Mak.  Electronically signed by YURIDIA Zabala, 12/10/19, 1:01 PM.      I have personally performed a face-to-face diagnostic evaluation on this patient.  I have reviewed and agree with the care plan.  Notes reviewed and edited.  Discussed with nurse practitioner.  Platelets remains are lower today. Continue steroids. Follow heme parameters. IVIG not available.  I discussed the patients findings and my recommendations with patient    Oma Mak MD  12/10/19  12:46 PM

## 2019-12-11 ENCOUNTER — APPOINTMENT (OUTPATIENT)
Dept: ULTRASOUND IMAGING | Facility: HOSPITAL | Age: 39
End: 2019-12-11

## 2019-12-11 ENCOUNTER — APPOINTMENT (OUTPATIENT)
Dept: CT IMAGING | Facility: HOSPITAL | Age: 39
End: 2019-12-11

## 2019-12-11 LAB
ACTIN IGG SERPL-ACNC: 10 UNITS (ref 0–19)
ALBUMIN SERPL-MCNC: 3.6 G/DL (ref 3.5–5.2)
ALBUMIN/GLOB SERPL: 1.3 G/DL
ALP LIVER CFR SERPL: 1.1 UNITS (ref 0–20)
ALP SERPL-CCNC: 67 U/L (ref 39–117)
ALT SERPL W P-5'-P-CCNC: 8 U/L (ref 1–41)
AMMONIA BLD-SCNC: 47 UMOL/L (ref 16–60)
ANA SER QL: ABNORMAL
ANION GAP SERPL CALCULATED.3IONS-SCNC: 12 MMOL/L (ref 5–15)
AST SERPL-CCNC: 8 U/L (ref 1–40)
BASOPHILS # BLD AUTO: 0 10*3/MM3 (ref 0–0.2)
BASOPHILS NFR BLD AUTO: 0.5 % (ref 0–1.5)
BILIRUB SERPL-MCNC: 0.2 MG/DL (ref 0.2–1.2)
BUN BLD-MCNC: 96 MG/DL (ref 6–20)
BUN/CREAT SERPL: 37.4 (ref 7–25)
CALCIUM SPEC-SCNC: 9.1 MG/DL (ref 8.6–10.5)
CHLORIDE SERPL-SCNC: 105 MMOL/L (ref 98–107)
CO2 SERPL-SCNC: 27 MMOL/L (ref 22–29)
CREAT BLD-MCNC: 2.57 MG/DL (ref 0.76–1.27)
D-LACTATE SERPL-SCNC: 0.6 MMOL/L (ref 0.5–2)
DEPRECATED MITOCHONDRIA M2 IGG SER-ACNC: <20 UNITS (ref 0–20)
DEPRECATED RDW RBC AUTO: 53.8 FL (ref 37–54)
EOSINOPHIL # BLD AUTO: 0 10*3/MM3 (ref 0–0.4)
EOSINOPHIL NFR BLD AUTO: 0 % (ref 0.3–6.2)
ERYTHROCYTE [DISTWIDTH] IN BLOOD BY AUTOMATED COUNT: 16.3 % (ref 12.3–15.4)
GFR SERPL CREATININE-BSD FRML MDRD: 28 ML/MIN/1.73
GLOBULIN UR ELPH-MCNC: 2.8 GM/DL
GLUCOSE BLD-MCNC: 113 MG/DL (ref 65–99)
GLUCOSE BLDC GLUCOMTR-MCNC: 139 MG/DL (ref 70–105)
GLUCOSE BLDC GLUCOMTR-MCNC: 144 MG/DL (ref 70–105)
GLUCOSE BLDC GLUCOMTR-MCNC: 199 MG/DL (ref 70–105)
GLUCOSE BLDC GLUCOMTR-MCNC: 92 MG/DL (ref 70–105)
HAV IGM SERPL QL IA: NORMAL
HBV CORE IGM SERPL QL IA: NORMAL
HBV SURFACE AG SERPL QL IA: NORMAL
HCT VFR BLD AUTO: 25.8 % (ref 37.5–51)
HCV AB SER DONR QL: NORMAL
HGB BLD-MCNC: 8.2 G/DL (ref 13–17.7)
HOLD SPECIMEN: NORMAL
INR PPP: 1.38 (ref 0.9–1.1)
LYMPHOCYTES # BLD AUTO: 0.5 10*3/MM3 (ref 0.7–3.1)
LYMPHOCYTES NFR BLD AUTO: 9.9 % (ref 19.6–45.3)
MAGNESIUM SERPL-MCNC: 2.7 MG/DL (ref 1.6–2.6)
MCH RBC QN AUTO: 29.3 PG (ref 26.6–33)
MCHC RBC AUTO-ENTMCNC: 31.7 G/DL (ref 31.5–35.7)
MCV RBC AUTO: 92.4 FL (ref 79–97)
MONOCYTES # BLD AUTO: 0.5 10*3/MM3 (ref 0.1–0.9)
MONOCYTES NFR BLD AUTO: 9.5 % (ref 5–12)
NEUTROPHILS # BLD AUTO: 4.1 10*3/MM3 (ref 1.7–7)
NEUTROPHILS NFR BLD AUTO: 80.1 % (ref 42.7–76)
NRBC BLD AUTO-RTO: 0.3 /100 WBC (ref 0–0.2)
PHOSPHATE SERPL-MCNC: 4.8 MG/DL (ref 2.5–4.5)
PLATELET # BLD AUTO: 52 10*3/MM3 (ref 140–450)
PMV BLD AUTO: 8.4 FL (ref 6–12)
POTASSIUM BLD-SCNC: 4.5 MMOL/L (ref 3.5–5.2)
POTASSIUM BLD-SCNC: 4.5 MMOL/L (ref 3.5–5.2)
PROT SERPL-MCNC: 6.4 G/DL (ref 6–8.5)
PROTHROMBIN TIME: 13.8 SECONDS (ref 9.6–11.7)
RBC # BLD AUTO: 2.79 10*6/MM3 (ref 4.14–5.8)
SODIUM BLD-SCNC: 144 MMOL/L (ref 136–145)
WBC NRBC COR # BLD: 5.1 10*3/MM3 (ref 3.4–10.8)

## 2019-12-11 PROCEDURE — 83605 ASSAY OF LACTIC ACID: CPT

## 2019-12-11 PROCEDURE — 63710000001 PREDNISONE PER 1 MG: Performed by: INTERNAL MEDICINE

## 2019-12-11 PROCEDURE — 70450 CT HEAD/BRAIN W/O DYE: CPT

## 2019-12-11 PROCEDURE — 83735 ASSAY OF MAGNESIUM: CPT | Performed by: INTERNAL MEDICINE

## 2019-12-11 PROCEDURE — 82962 GLUCOSE BLOOD TEST: CPT

## 2019-12-11 PROCEDURE — 94660 CPAP INITIATION&MGMT: CPT

## 2019-12-11 PROCEDURE — 84100 ASSAY OF PHOSPHORUS: CPT | Performed by: INTERNAL MEDICINE

## 2019-12-11 PROCEDURE — 99232 SBSQ HOSP IP/OBS MODERATE 35: CPT | Performed by: INTERNAL MEDICINE

## 2019-12-11 PROCEDURE — 85610 PROTHROMBIN TIME: CPT | Performed by: NURSE PRACTITIONER

## 2019-12-11 PROCEDURE — 25010000002 HALOPERIDOL LACTATE PER 5 MG: Performed by: PHYSICIAN ASSISTANT

## 2019-12-11 PROCEDURE — 85025 COMPLETE CBC W/AUTO DIFF WBC: CPT | Performed by: NURSE PRACTITIONER

## 2019-12-11 PROCEDURE — 25010000002 FONDAPARINUX PER 0.5 MG: Performed by: INTERNAL MEDICINE

## 2019-12-11 PROCEDURE — 80053 COMPREHEN METABOLIC PANEL: CPT | Performed by: INTERNAL MEDICINE

## 2019-12-11 PROCEDURE — 99232 SBSQ HOSP IP/OBS MODERATE 35: CPT | Performed by: NURSE PRACTITIONER

## 2019-12-11 PROCEDURE — 99233 SBSQ HOSP IP/OBS HIGH 50: CPT | Performed by: INTERNAL MEDICINE

## 2019-12-11 PROCEDURE — 82140 ASSAY OF AMMONIA: CPT | Performed by: NURSE PRACTITIONER

## 2019-12-11 PROCEDURE — 76705 ECHO EXAM OF ABDOMEN: CPT

## 2019-12-11 PROCEDURE — 63710000001 INSULIN LISPRO (HUMAN) PER 5 UNITS: Performed by: INTERNAL MEDICINE

## 2019-12-11 PROCEDURE — 94799 UNLISTED PULMONARY SVC/PX: CPT

## 2019-12-11 PROCEDURE — 84132 ASSAY OF SERUM POTASSIUM: CPT | Performed by: INTERNAL MEDICINE

## 2019-12-11 RX ORDER — OLANZAPINE 5 MG/1
5 TABLET, ORALLY DISINTEGRATING ORAL 2 TIMES DAILY PRN
Status: DISCONTINUED | OUTPATIENT
Start: 2019-12-11 | End: 2019-12-12

## 2019-12-11 RX ORDER — PREDNISONE 20 MG/1
40 TABLET ORAL
Status: DISCONTINUED | OUTPATIENT
Start: 2019-12-11 | End: 2019-12-13 | Stop reason: HOSPADM

## 2019-12-11 RX ORDER — OLANZAPINE 5 MG/1
5 TABLET ORAL NIGHTLY
Status: DISCONTINUED | OUTPATIENT
Start: 2019-12-11 | End: 2019-12-12

## 2019-12-11 RX ORDER — OLANZAPINE 5 MG/1
5 TABLET, ORALLY DISINTEGRATING ORAL NIGHTLY
Status: DISCONTINUED | OUTPATIENT
Start: 2019-12-11 | End: 2019-12-11

## 2019-12-11 RX ADMIN — GABAPENTIN 300 MG: 300 CAPSULE ORAL at 17:08

## 2019-12-11 RX ADMIN — LACOSAMIDE 100 MG: 100 TABLET, FILM COATED ORAL at 08:42

## 2019-12-11 RX ADMIN — CYANOCOBALAMIN TAB 250 MCG 250 MCG: 250 TAB at 08:42

## 2019-12-11 RX ADMIN — Medication 10 ML: at 20:49

## 2019-12-11 RX ADMIN — PREDNISONE 40 MG: 20 TABLET ORAL at 08:42

## 2019-12-11 RX ADMIN — INSULIN LISPRO 2 UNITS: 100 INJECTION, SOLUTION INTRAVENOUS; SUBCUTANEOUS at 20:48

## 2019-12-11 RX ADMIN — CARVEDILOL 3.12 MG: 3.12 TABLET, FILM COATED ORAL at 18:08

## 2019-12-11 RX ADMIN — RIFAXIMIN 550 MG: 550 TABLET ORAL at 20:47

## 2019-12-11 RX ADMIN — DOXYCYCLINE 100 MG: 100 TABLET, FILM COATED ORAL at 06:26

## 2019-12-11 RX ADMIN — LEVETIRACETAM 500 MG: 500 TABLET ORAL at 20:47

## 2019-12-11 RX ADMIN — SODIUM BICARBONATE 650 MG TABLET 1300 MG: at 08:45

## 2019-12-11 RX ADMIN — LEVETIRACETAM 500 MG: 500 TABLET ORAL at 08:42

## 2019-12-11 RX ADMIN — OYSTER SHELL CALCIUM WITH VITAMIN D 1 TABLET: 500; 200 TABLET, FILM COATED ORAL at 20:47

## 2019-12-11 RX ADMIN — OLANZAPINE 5 MG: 5 TABLET, FILM COATED ORAL at 20:55

## 2019-12-11 RX ADMIN — GABAPENTIN 300 MG: 300 CAPSULE ORAL at 06:26

## 2019-12-11 RX ADMIN — GABAPENTIN 300 MG: 300 CAPSULE ORAL at 20:47

## 2019-12-11 RX ADMIN — MUPIROCIN: 20 OINTMENT TOPICAL at 20:55

## 2019-12-11 RX ADMIN — OYSTER SHELL CALCIUM WITH VITAMIN D 1 TABLET: 500; 200 TABLET, FILM COATED ORAL at 08:42

## 2019-12-11 RX ADMIN — LACTULOSE 20 G: 10 SOLUTION ORAL at 20:47

## 2019-12-11 RX ADMIN — CLONAZEPAM 0.5 MG: 0.5 TABLET ORAL at 08:42

## 2019-12-11 RX ADMIN — Medication 10 ML: at 08:44

## 2019-12-11 RX ADMIN — FONDAPARINUX SODIUM 7.5 MG: 7.5 INJECTION, SOLUTION SUBCUTANEOUS at 17:07

## 2019-12-11 RX ADMIN — CARVEDILOL 3.12 MG: 3.12 TABLET, FILM COATED ORAL at 08:42

## 2019-12-11 RX ADMIN — SODIUM BICARBONATE 650 MG TABLET 1300 MG: at 20:47

## 2019-12-11 RX ADMIN — FOLIC ACID 1 MG: 1 TABLET ORAL at 08:42

## 2019-12-11 RX ADMIN — FEBUXOSTAT 40 MG: 40 TABLET ORAL at 08:42

## 2019-12-11 RX ADMIN — SODIUM BICARBONATE 650 MG TABLET 1300 MG: at 17:08

## 2019-12-11 RX ADMIN — BUPRENORPHINE HYDROCHLORIDE 20 MG: 8 TABLET SUBLINGUAL at 08:41

## 2019-12-11 RX ADMIN — SERTRALINE HYDROCHLORIDE 50 MG: 50 TABLET ORAL at 08:42

## 2019-12-11 RX ADMIN — RIFAXIMIN 550 MG: 550 TABLET ORAL at 08:42

## 2019-12-11 RX ADMIN — DOXYCYCLINE 100 MG: 100 TABLET, FILM COATED ORAL at 17:08

## 2019-12-11 RX ADMIN — HALOPERIDOL LACTATE 1 MG: 5 INJECTION INTRAMUSCULAR at 08:42

## 2019-12-11 RX ADMIN — LACTULOSE 20 G: 10 SOLUTION ORAL at 08:42

## 2019-12-11 RX ADMIN — LACOSAMIDE 100 MG: 100 TABLET, FILM COATED ORAL at 20:47

## 2019-12-11 RX ADMIN — CLONAZEPAM 0.5 MG: 0.5 TABLET ORAL at 20:47
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
## 2019-12-11 NOTE — PROGRESS NOTES
Hematology/Oncology Inpatient Progress Note    PATIENT NAME: Lorenzo Crockett  : 1980  MRN: [id number]    CHIEF COMPLAINT: Pancytopenia    HISTORY OF PRESENT ILLNESS:    39 y.o. male admitted through the ED 2019 after he was instructed to go by the cancer center with abnormal elevation of potassium (5.5) on outpatient labs 2019.  The patient had been instructed to go to the ED 2019.  He reported worsening abdominal distention.  Repeat potassium level was normal (5.0) but creatinine level was rising (3.92) from baseline.  Nephrology was consulted and the patient has known CKD secondary to SLE/MERYL.  LFTs were okay.  CBC showed WBC 6.6, hemoglobin 8.6, MCV 90.7, and platelets 118,000.  Chest x-ray showed no change in alveolar and airspace opacities bilaterally as compared with 2019.  Renal ultrasound was negative for hydronephrosis and showed markedly echogenic kidneys with mild cortical thickening and moderate ascites. On 19 he was started on daily Solu-Medrol. On 2019 CBC showed WBC 3.2, hemoglobin 8.4, and platelets 106,000.      19  Hematology/Oncology was consulted as the patient is known to our service for thrombocytopenia and thrombophilia.  He was initially evaluated by Dr. Mak in .  He was anemic at that time and diagnosed with JUNG.  Thrombocytopenia was felt due to medication (risperidone) and concern for bone marrow suppression secondary to lupus treatment.  He was also noted at that time to have bulky adenopathy on CT scans with PET ordered but not completed due to patient noncompliance.  He was lost to follow-up and seen again in 2017 for thrombocytopenia during hospital admission.  Thrombocytopenia stabilized and no etiology was detected.  He had a history of positive lupus anticoagulant and was continued on Arixtra and had undergone an IVC filter placement at some point.  Anemia again was felt due to JUNG and ACD.  He was lost to follow-up  again until consulted during an additional hospitalization in 2019.  He had been evaluated by rheumatology and diagnosed with catastrophic antiphospholipid syndrome treated with plasma exchange x5 and high-dose steroids along with a brief period of dialysis.  On 3/11/2019 hematologic parameters including haptoglobin, normal at 64.  .  Ferritin 32.  Complement C3, C4 was low.  Serum iron was low at 27.  .  Viral hepatitis panel was nonreactive.  B12 was more than 1,500.  Folate was more than 24.  He received IV IgG 40 g on 4/4, 4/5, and 4/8/2019.  He received Injectafer x2 doses with last given 4/15/2019.   He was last seen as an outpatient on 12/4/2019 where CBC revealed WBC 6.49, hemoglobin 8.4, and platelets 89,000.  He complained of chest pain and oxygen saturation falling into the 60s when off home oxygen and was encouraged to go to the ED.  For his thrombophilia he was noted to have had an IVC filter placed with most recent anticoagulation of Arixtra 7.5 mg subcu daily. Thrombocytopenia was felt likely secondary to ITP and underlying autoimmune disease with baseline platelet count 100,000. For his multifactorial anemia including JUNG he was encouraged to see GI and plans were to continue as needed Injectafer with the addition of Procrit if needed secondary to CKD.  Vitamin B12 deficiency was noted in August 2019.  He was on oral replacement and this was changed to injectable.  On 12/4/2019 iron studies showed iron 33 (), and iron saturation 13% (20-50), TIBC 252 (298-536), and ferritin 279 ().     PCP: Lori Hale NP    Subjective Complains about pain mostly in the back    ROS:  Review of Systems   Constitutional: Negative for chills and fever.   HENT: Negative for ear pain, mouth sores, nosebleeds and sore throat.    Eyes: Negative for photophobia and visual disturbance.   Respiratory: Negative for wheezing and stridor.    Cardiovascular: Negative for chest pain and palpitations.    Gastrointestinal: Negative for abdominal pain, diarrhea, nausea and vomiting.   Endocrine: Negative for cold intolerance and heat intolerance.   Genitourinary: Negative for dysuria and hematuria.   Musculoskeletal: Negative for joint swelling and neck stiffness.   Skin: Negative for color change and rash.   Neurological: Negative for seizures and syncope.   Hematological: Negative for adenopathy.        No obvious bleeding   Psychiatric/Behavioral: Negative for agitation, confusion and hallucinations.        MEDICATIONS:    Scheduled Meds:      buprenorphine 20 mg Sublingual Daily   calcium gluconate 1 g Intravenous Once   calcium-vitamin D 1 tablet Oral BID   carvedilol 3.125 mg Oral BID With Meals   clonazePAM 0.5 mg Oral BID   doxycycline 100 mg Oral Q12H   epoetin candy/candy-epbx 20,000 Units Subcutaneous Q14 Days   febuxostat 40 mg Oral Daily   folic acid 1 mg Oral Daily   fondaparinux 7.5 mg Subcutaneous Daily   gabapentin 300 mg Oral Q8H   insulin lispro 0-7 Units Subcutaneous 4x Daily With Meals & Nightly   lacosamide 100 mg Oral BID   lactulose 20 g Oral TID   levETIRAcetam 500 mg Oral Q12H   mupirocin  Topical Q12H   OLANZapine zydis 5 mg Oral Nightly   predniSONE 40 mg Oral Daily With Breakfast   rifAXIMin 550 mg Oral Q12H   sertraline 50 mg Oral Daily   sodium bicarbonate 1,300 mg Oral TID   sodium chloride 10 mL Intravenous Q12H   vitamin B-12 250 mcg Oral Daily      Continuous Infusions:        PRN Meds:  •  bisacodyl  •  bisacodyl  •  calcium carbonate  •  dextrose  •  dextrose  •  famotidine  •  glucagon (human recombinant)  •  insulin lispro **AND** insulin lispro  •  ketamine (KETALAR) infusion **AND** Ketamine Vital Signs & Assessment  •  melatonin  •  nitroglycerin  •  OLANZapine zydis  •  ondansetron **OR** ondansetron  •  prochlorperazine  •  rOPINIRole  •  [COMPLETED] Insert peripheral IV **AND** sodium chloride  •  sodium chloride     ALLERGIES:    Allergies   Allergen Reactions   • Tramadol  "Other (See Comments)     seizure   • Melon Rash       Objective    VITALS:   /98   Pulse 81   Temp 97.4 °F (36.3 °C) (Axillary)   Resp 23   Ht 185.4 cm (73\")   Wt 91 kg (200 lb 9.9 oz)   SpO2 95%   BMI 26.47 kg/m²     PHYSICAL EXAM:  Physical Exam   Constitutional: He is oriented to person, place, and time. No distress.   Frail   HENT:   Head: Normocephalic and atraumatic.   Eyes: Conjunctivae and EOM are normal. Right eye exhibits no discharge. Left eye exhibits no discharge. No scleral icterus.   Neck: Normal range of motion. Neck supple. No thyromegaly present.   Cardiovascular: Normal rate, regular rhythm and normal heart sounds. Exam reveals no gallop and no friction rub.   Pulmonary/Chest: Effort normal. No stridor. No respiratory distress. He has no wheezes.   Diminished breath sounds bilaterally   Abdominal: Soft. Bowel sounds are normal. He exhibits no mass. There is no tenderness. There is no rebound and no guarding.   Musculoskeletal: Normal range of motion. He exhibits deformity ( History of LLE partial amputation). He exhibits no tenderness.   Lymphadenopathy:     He has no cervical adenopathy.   Neurological: He is alert and oriented to person, place, and time. He exhibits normal muscle tone.   Skin: Skin is warm. No rash noted. He is not diaphoretic. No erythema.   Psychiatric: He has a normal mood and affect. His behavior is normal.   Nursing note and vitals reviewed.        RECENT LABS:  Lab Results (last 24 hours)     Procedure Component Value Units Date/Time    KIERAN [[id number]]  (Abnormal) Collected:  12/10/19 1314    Specimen:  Blood Updated:  12/11/19 1229     Antinuclear Antibodies (KIERAN) Equivocal    KIERAN Comprehensive Plus Profile [[id number]] Collected:  12/10/19 1314    Specimen:  Blood Updated:  12/11/19 1220    POC Glucose Once [[id number]]  (Abnormal) Collected:  12/11/19 1118    Specimen:  Blood Updated:  12/11/19 1124     Glucose 139 mg/dL      Comment: Serial Number: " [id number]:  [id number]       Blood Culture - Blood, Arm, Left [[id number]] Collected:  12/08/19 1004    Specimen:  Blood from Arm, Left Updated:  12/11/19 1053     Blood Culture No growth at 3 days    Blood Culture - Blood, Wrist, Right [[id number]] Collected:  12/08/19 1005    Specimen:  Blood from Wrist, Right Updated:  12/11/19 1053     Blood Culture No growth at 3 days    POC Glucose Once [[id number]]  (Normal) Collected:  12/11/19 0725    Specimen:  Blood Updated:  12/11/19 0727     Glucose 92 mg/dL      Comment: Serial Number: [id number]:  [id number]       Hepatitis Panel, Acute [[id number]]  (Normal) Collected:  12/10/19 2230    Specimen:  Blood Updated:  12/11/19 0704     Hepatitis B Surface Ag Non-Reactive     Hep A IgM Non-Reactive     Hep B C IgM Non-Reactive     Hepatitis C Ab Non-Reactive    Comprehensive Metabolic Panel [[id number]]  (Abnormal) Collected:  12/11/19 0345    Specimen:  Blood Updated:  12/11/19 0504     Glucose 113 mg/dL      BUN 96 mg/dL      Creatinine 2.57 mg/dL      Sodium 144 mmol/L      Potassium 4.5 mmol/L      Chloride 105 mmol/L      CO2 27.0 mmol/L      Calcium 9.1 mg/dL      Total Protein 6.4 g/dL      Albumin 3.60 g/dL      ALT (SGPT) 8 U/L      AST (SGOT) 8 U/L      Alkaline Phosphatase 67 U/L      Total Bilirubin 0.2 mg/dL      eGFR Non African Amer 28 mL/min/1.73      Globulin 2.8 gm/dL      A/G Ratio 1.3 g/dL      BUN/Creatinine Ratio 37.4     Anion Gap 12.0 mmol/L     Narrative:       GFR Normal >60  Chronic Kidney Disease <60  Kidney Failure <15      Potassium [[id number]]  (Normal) Collected:  12/11/19 0345    Specimen:  Blood Updated:  12/11/19 0421     Potassium 4.5 mmol/L     Magnesium [[id number]]  (Abnormal) Collected:  12/11/19 0345    Specimen:  Blood Updated:  12/11/19 0421     Magnesium 2.7 mg/dL     Phosphorus [[id number]]  (Abnormal) Collected:  12/11/19 0238    Specimen:  Blood Updated:  12/11/19 0308     Phosphorus 4.8 mg/dL     Ammonia [[id number]]   (Normal) Collected:  12/11/19 0238    Specimen:  Blood Updated:  12/11/19 0301     Ammonia 47 umol/L     Lactic Acid, Reflex [[id number]]  (Normal) Collected:  12/11/19 0238    Specimen:  Blood Updated:  12/11/19 0301     Lactate 0.6 mmol/L     Protime-INR [[id number]]  (Abnormal) Collected:  12/11/19 0238    Specimen:  Blood Updated:  12/11/19 0255     Protime 13.8 Seconds      INR 1.38    CBC & Differential [[id number]] Collected:  12/11/19 0238    Specimen:  Blood Updated:  12/11/19 0248    Narrative:       The following orders were created for panel order CBC & Differential.  Procedure                               Abnormality         Status                     ---------                               -----------         ------                     CBC Auto Differential[[id number]]        Abnormal            Final result                 Please view results for these tests on the individual orders.    CBC Auto Differential [[id number]]  (Abnormal) Collected:  12/11/19 0238    Specimen:  Blood Updated:  12/11/19 0248     WBC 5.10 10*3/mm3      RBC 2.79 10*6/mm3      Hemoglobin 8.2 g/dL      Hematocrit 25.8 %      MCV 92.4 fL      MCH 29.3 pg      MCHC 31.7 g/dL      RDW 16.3 %      RDW-SD 53.8 fl      MPV 8.4 fL      Platelets 52 10*3/mm3      Neutrophil % 80.1 %      Lymphocyte % 9.9 %      Monocyte % 9.5 %      Eosinophil % 0.0 %      Basophil % 0.5 %      Neutrophils, Absolute 4.10 10*3/mm3      Lymphocytes, Absolute 0.50 10*3/mm3      Monocytes, Absolute 0.50 10*3/mm3      Eosinophils, Absolute 0.00 10*3/mm3      Basophils, Absolute 0.00 10*3/mm3      nRBC 0.3 /100 WBC     Lactic Acid, Reflex Timer (This will reflex a repeat order 3-3:15 hours after ordered.) [[id number]] Collected:  12/10/19 0718    Specimen:  Blood Updated:  12/11/19 0230     Extra Tube Hold for add-ons.     Comment: Auto resulted.       POC Lactate [[id number]]  (Abnormal) Collected:  12/10/19 0718    Specimen:  Blood Updated:  12/10/19 9236      Lactate 3.2 mmol/L      Comment: Serial Number: [id number]:  [id number]       POC Glucose Once [[id number]]  (Abnormal) Collected:  12/10/19 2053    Specimen:  Blood Updated:  12/10/19 2055     Glucose 168 mg/dL      Comment: Serial Number: [id number]:  [id number]       Alpha - 1 - Antitrypsin [[id number]]  (Abnormal) Collected:  12/10/19 1314    Specimen:  Blood Updated:  12/10/19 1720     ALPHA -1 ANTITRYPSIN 244 mg/dL     Ceruloplasmin [[id number]]  (Normal) Collected:  12/10/19 0422    Specimen:  Blood Updated:  12/10/19 1707     Ceruloplasmin 26 mg/dL     AFP Tumor Marker [[id number]]  (Normal) Collected:  12/10/19 1314    Specimen:  Blood Updated:  12/10/19 1703     ALPHA-FETOPROTEIN 2.47 ng/mL     Narrative:       Alpha Fetoprotein Tumor Marker Reference Range:    0.0-8.3 ng/mL    Note: Normal values apply only to males and nonpregnant females. These results are not interpretable for pregnant females.    POC Glucose Once [[id number]]  (Abnormal) Collected:  12/10/19 1636    Specimen:  Blood Updated:  12/10/19 1647     Glucose 138 mg/dL      Comment: Serial Number: [id number]:  [id number]       Gamma GT [[id number]]  (Normal) Collected:  12/10/19 0422    Specimen:  Blood Updated:  12/10/19 1632     GGT 38 U/L           PENDING RESULTS: n/a    IMAGING REVIEWED:  Ct Head Without Contrast    Result Date: 12/11/2019  No interval change from the previous study with abnormalities as described above.  Electronically Signed By-Bar James On:12/11/2019 1:15 PM This report was finalized on [id number] by  Bar James, .    Us Liver    Result Date: 12/11/2019  1.  Cirrhosis with moderate ascites. 2.  There is a thickened gallbladder wall and gallstones.  This can be a sign of cholecystitis.  If there are signs or symptoms of cholecystitis consider HIDA scan to check gallbladder function. Electronically signed by:  Serafin Carrillo M.D.  12/11/2019 12:28 AM    Xr Chest 1 View    Result Date: 12/10/2019  Limited study  demonstrating stable patchy hazy opacities in both lungs, which could represent edema, atypical pneumonia, or chronic lung disease.  Electronically Signed By-Allan Salomon On:12/10/2019 1:47 PM This report was finalized on [id number] by  Allan Salomon, .      I have reviewed the remained stable at 94,000 labs, imaging, reports, and other clinician documentation.    Assessment/Plan   ASSESSMENT:  1. Pancytopenia/chronic thrombocytopenia (possible ITP)/JUNG and ACD secondary to CKD/vitamin B12 deficiency- s/p Venofer and on Procrit as inpatient.  On oral B12- injection x1 ordered.  WBC with mild drop post admission possibly related to antibiotics.    Platelets fluctuating some.  With ITP could use IV IgG however this is not available due to backorder and Nplate could be considered but there is concern for clotting with this medication given patient's history.  2. Thrombophilia/antiphospholipid syndrome/history of PE- status post IVC filter.  Currently on Arixtra.    3. TABBY on CKD stage III/proteinuria/electrolyte imbalances-Dr. Goodwin managing with TABBY felt secondary to prerenal state and occasional NSAID use and concern for recurrent minimal-change disease. On solumedrol 250mg IV daily.  Patient declining renal biopsy.  4. History of minimal-change disease/SLE- chronic SLE.  MCV treated in past with plasma exchange and high-dose steroids.   5. LLE wound - MRSA positive.  Per ID.  6. CUMMINS/ascites- last paracentesis August 2019.  Albumin low.  LFTs ok.  GI consulted by primary team.  7. Hypoxia/chronic respiratory failure-oxygen dependent. On Zosyn per primary team.  8. Confusion/encephalopathy - CT head neg. Ammonia level normal.  Primary team note states patient had been taking his own pain medication from home, taken away, now suspicious for withdrawal.    PLAN:  1. IV IgG unavailable due to backorder and hesitant to use N-plate with risk of clotting given antiphospholipid syndrome.   2. Rheumatology does not  provide inpatient consults.  3. Continue Solu-Medrol.  4. Continue Arixtra and Procrit/Retacrit.  5. Daily CBC.  6. Continue Vitamin B12 1000 mcg IM monthly as outpatient.          Note updated by MARQUISE Santizo.  Patient seen and examined by Dr. Mak.  Electronically signed by YURIDIA Zabala, 12/11/19, 1:40 PM.    I have personally performed a face-to-face diagnostic evaluation on this patient.  I have reviewed and agree with the care plan.  Notes reviewed and edited.  Discussed with nurse practitioner.  Platelets remains are stable today. Continue steroids. Follow heme parameters. IVIG not available.  I discussed the patients findings and my recommendations with patient    Oma Mak MD  12/11/19  1:39 PM

## 2019-12-11 NOTE — NURSING NOTE
Patient transferred to unit after fast team called. Bipap ordered and placed by RT. Patient resting at this time. MD notified.

## 2019-12-11 NOTE — PROGRESS NOTES
" LOS: 4 days   Patient Care Team:  Lori Hale NP as PCP - General (Family Medicine)  Lori Hale NP as PCP - Claims Attributed  Lori Hale NP as PCP - Family Medicine  Oma Mak MD as Consulting Physician (Hematology and Oncology)  Pito Goodwin MD as Consulting Physician (Nephrology)      Subjective     Interval History:     Subjective: Patient is confused and combative.  Currently in soft restraints with sitter present at bedside.  Patient screaming that he \"needs to go to the bathroom.\"      Review of Systems   Unable to perform ROS: Mental status change        Medication Review:     Current Facility-Administered Medications:   •  baclofen (LIORESAL) tablet 10 mg, 10 mg, Oral, TID PRN, Pretty Contreras MD  •  bisacodyl (DULCOLAX) EC tablet 5 mg, 5 mg, Oral, Daily PRN, Pretty Contreras MD  •  bisacodyl (DULCOLAX) suppository 10 mg, 10 mg, Rectal, Daily PRN, Pretty Contreras MD  •  buprenorphine (SUBUTEX) SL tablet 20 mg, 20 mg, Sublingual, Daily, Pretty Contreras MD, 20 mg at 12/11/19 0841  •  calcium carbonate (TUMS) chewable tablet 500 mg (200 mg elemental), 1 tablet, Oral, BID PRN, Pretty Contreras MD  •  calcium gluconate 1g/50ml 0.675% NaCl IV SOLN, 1 g, Intravenous, Once, Pito Goodwin MD, Stopped at 12/07/19 1527  •  calcium-vitamin D 500-200 MG-UNIT per tablet 1 tablet, 1 tablet, Oral, BID, Pretty Contreras MD, 1 tablet at 12/11/19 0842  •  carvedilol (COREG) tablet 3.125 mg, 3.125 mg, Oral, BID With Meals, Pretty Contreras MD, 3.125 mg at 12/11/19 0842  •  clonazePAM (KlonoPIN) tablet 0.5 mg, 0.5 mg, Oral, BID, Pretty Contreras MD, 0.5 mg at 12/11/19 0842  •  dextrose (D50W) 25 g/ 50mL Intravenous Solution 25 g, 25 g, Intravenous, Q15 Min PRN, Pito Goodwin MD  •  dextrose (GLUTOSE) oral gel 15 g, 15 g, Oral, Q15 Min PRN, Pito Goodwin MD  •  doxycycline (ADOXA) tablet 100 mg, 100 mg, Oral, Q12H, Marlee Lance APRN, 100 mg at 12/11/19 0626  •  epoetin " candy-epbx (RETACRIT) injection 20,000 Units, 20,000 Units, Subcutaneous, Q14 Days, Pito Goodwin MD, 20,000 Units at 12/08/19 1548  •  famotidine (PEPCID) tablet 20 mg, 20 mg, Oral, BID PRN, Pretty Contreras MD  •  febuxostat (ULORIC) tablet 40 mg, 40 mg, Oral, Daily, Pito Goodwin MD, 40 mg at 12/11/19 0842  •  folic acid (FOLVITE) tablet 1 mg, 1 mg, Oral, Daily, Pretty Contreras MD, 1 mg at 12/11/19 0842  •  fondaparinux (ARIXTRA) injection 7.5 mg, 7.5 mg, Subcutaneous, Daily, Pretty Contreras MD, Stopped at 12/10/19 1727  •  gabapentin (NEURONTIN) capsule 300 mg, 300 mg, Oral, Q8H, Pretty Contreras MD, 300 mg at 12/11/19 0626  •  glucagon (human recombinant) (GLUCAGEN DIAGNOSTIC) injection 1 mg, 1 mg, Subcutaneous, Q15 Min PRN, Pito Goodwin MD  •  haloperidol lactate (HALDOL) injection 1 mg, 1 mg, Intravenous, Q6H PRN, Pushpa Dueñas PA-C, 1 mg at 12/11/19 0842  •  insulin lispro (humaLOG) injection 0-7 Units, 0-7 Units, Subcutaneous, 4x Daily With Meals & Nightly, 2 Units at 12/10/19 2236 **AND** insulin lispro (humaLOG) injection 0-7 Units, 0-7 Units, Subcutaneous, PRN, Pito Goodwin MD  •  ketamine (KETALAR) 27 mg in sodium chloride 0.9 % 100 mL infusion, 0.3 mg/kg, Intravenous, Daily PRN, 27 mg at 12/10/19 2350 **AND** Ketamine Vital Signs & Assessment, , , Per Order Details, Dallas Rocha MD  •  lacosamide (VIMPAT) tablet 100 mg, 100 mg, Oral, BID, Pretty Contreras MD, 100 mg at 12/11/19 0842  •  lactulose (CHRONULAC) 10 GM/15ML solution 20 g, 20 g, Oral, TID, Pretty Contreras MD, 20 g at 12/11/19 0842  •  levETIRAcetam (KEPPRA) tablet 500 mg, 500 mg, Oral, Q12H, Pretty Contreras MD, 500 mg at 12/11/19 0842  •  melatonin tablet 5 mg, 5 mg, Oral, Nightly PRN, Pretty Contreras MD  •  mupirocin (BACTROBAN) 2 % ointment, , Topical, Q12H, Senthil Hemphill MD  •  nitroglycerin (NITROSTAT) SL tablet 0.4 mg, 0.4 mg, Sublingual, Q5 Min PRN, Pretty Contreras MD  •  ondansetron (ZOFRAN) tablet 4 mg, 4  mg, Oral, Q6H PRN **OR** ondansetron (ZOFRAN) injection 4 mg, 4 mg, Intravenous, Q6H PRN, Pretty Contreras MD  •  predniSONE (DELTASONE) tablet 40 mg, 40 mg, Oral, Daily With Breakfast, Pito Goodwin MD, 40 mg at 12/11/19 0842  •  prochlorperazine (COMPAZINE) tablet 5 mg, 5 mg, Oral, Q8H PRN, Pretty Contreras MD  •  QUEtiapine (SEROquel) tablet 50 mg, 50 mg, Oral, Nightly PRN, Pretty Contreras MD  •  riFAXIMin (XIFAXAN) tablet 550 mg, 550 mg, Oral, Q12H, Galina Mcfarlane, APRN, 550 mg at 12/11/19 0842  •  rOPINIRole (REQUIP) tablet 0.5 mg, 0.5 mg, Oral, Daily PRN, Pretty Contreras MD  •  sertraline (ZOLOFT) tablet 50 mg, 50 mg, Oral, Daily, Pretty Contreras MD, 50 mg at 12/11/19 0842  •  sodium bicarbonate tablet 1,300 mg, 1,300 mg, Oral, TID, Pito Goodwin MD, 1,300 mg at 12/11/19 0845  •  [COMPLETED] Insert peripheral IV, , , Once **AND** sodium chloride 0.9 % flush 10 mL, 10 mL, Intravenous, PRN, Octavio Baumann MD  •  sodium chloride 0.9 % flush 10 mL, 10 mL, Intravenous, Q12H, Pretty Contreras MD, 10 mL at 12/11/19 0844  •  sodium chloride 0.9 % flush 10 mL, 10 mL, Intravenous, PRN, Pretty Contreras MD  •  vitamin B-12 (CYANOCOBALAMIN) tablet 250 mcg, 250 mcg, Oral, Daily, Pretty Contreras MD, 250 mcg at 12/11/19 0842      Objective     Vital Signs  Temp:  [96.7 °F (35.9 °C)-98.2 °F (36.8 °C)] 97.9 °F (36.6 °C)  Heart Rate:  [67-95] 85  Resp:  [15-22] 20  BP: (149-157)/() 154/109  Physical Exam:    General Appearance:    Awake, combative, confused, in no acute distress, currently in soft restraints with sitter present at bedside   Head:    Normocephalic, without obvious abnormality   Eyes:          Conjunctivae normal, anicteric sclera   Ears:    Hearing intact   Throat:   No oral lesions, no thrush, oral mucosa moist   Neck:   No adenopathy, supple, no JVD   Lungs:     Clear to auscultation bilaterally, respirations regular, even and unlabored    Heart:    Regular rhythm and normal rate, normal S1 and S2, no             murmur, no gallop, no rub   Abdomen:     Normal bowel sounds, soft, generalized tenderness, no rebound or guarding, moderate distention, no hepatosplenomegaly    Rectal:     Deferred   Extremities:   No edema, no cyanosis, no redness   Skin:   No bleeding, bruising or rash, no jaundice   Neurologic:   Cranial nerves 2 - 12 grossly intact, no asterixis, sensation   intact        Results Review:    Lab Results (last 24 hours)     Procedure Component Value Units Date/Time    POC Glucose Once [[id number]]  (Normal) Collected:  12/11/19 0725    Specimen:  Blood Updated:  12/11/19 0727     Glucose 92 mg/dL      Comment: Serial Number: [id number]:  [id number]       Hepatitis Panel, Acute [[id number]]  (Normal) Collected:  12/10/19 2230    Specimen:  Blood Updated:  12/11/19 0704     Hepatitis B Surface Ag Non-Reactive     Hep A IgM Non-Reactive     Hep B C IgM Non-Reactive     Hepatitis C Ab Non-Reactive    Comprehensive Metabolic Panel [[id number]]  (Abnormal) Collected:  12/11/19 0345    Specimen:  Blood Updated:  12/11/19 0504     Glucose 113 mg/dL      BUN 96 mg/dL      Creatinine 2.57 mg/dL      Sodium 144 mmol/L      Potassium 4.5 mmol/L      Chloride 105 mmol/L      CO2 27.0 mmol/L      Calcium 9.1 mg/dL      Total Protein 6.4 g/dL      Albumin 3.60 g/dL      ALT (SGPT) 8 U/L      AST (SGOT) 8 U/L      Alkaline Phosphatase 67 U/L      Total Bilirubin 0.2 mg/dL      eGFR Non African Amer 28 mL/min/1.73      Globulin 2.8 gm/dL      A/G Ratio 1.3 g/dL      BUN/Creatinine Ratio 37.4     Anion Gap 12.0 mmol/L     Narrative:       GFR Normal >60  Chronic Kidney Disease <60  Kidney Failure <15      Potassium [[id number]]  (Normal) Collected:  12/11/19 0345    Specimen:  Blood Updated:  12/11/19 0421     Potassium 4.5 mmol/L     Magnesium [[id number]]  (Abnormal) Collected:  12/11/19 0345    Specimen:  Blood Updated:  12/11/19 0421     Magnesium 2.7 mg/dL     Phosphorus [[id number]]  (Abnormal) Collected:  12/11/19  0238    Specimen:  Blood Updated:  12/11/19 0308     Phosphorus 4.8 mg/dL     Ammonia [[id number]]  (Normal) Collected:  12/11/19 0238    Specimen:  Blood Updated:  12/11/19 0301     Ammonia 47 umol/L     Lactic Acid, Reflex [[id number]]  (Normal) Collected:  12/11/19 0238    Specimen:  Blood Updated:  12/11/19 0301     Lactate 0.6 mmol/L     Protime-INR [[id number]]  (Abnormal) Collected:  12/11/19 0238    Specimen:  Blood Updated:  12/11/19 0255     Protime 13.8 Seconds      INR 1.38    CBC & Differential [[id number]] Collected:  12/11/19 0238    Specimen:  Blood Updated:  12/11/19 0248    Narrative:       The following orders were created for panel order CBC & Differential.  Procedure                               Abnormality         Status                     ---------                               -----------         ------                     CBC Auto Differential[[id number]]        Abnormal            Final result                 Please view results for these tests on the individual orders.    CBC Auto Differential [[id number]]  (Abnormal) Collected:  12/11/19 0238    Specimen:  Blood Updated:  12/11/19 0248     WBC 5.10 10*3/mm3      RBC 2.79 10*6/mm3      Hemoglobin 8.2 g/dL      Hematocrit 25.8 %      MCV 92.4 fL      MCH 29.3 pg      MCHC 31.7 g/dL      RDW 16.3 %      RDW-SD 53.8 fl      MPV 8.4 fL      Platelets 52 10*3/mm3      Neutrophil % 80.1 %      Lymphocyte % 9.9 %      Monocyte % 9.5 %      Eosinophil % 0.0 %      Basophil % 0.5 %      Neutrophils, Absolute 4.10 10*3/mm3      Lymphocytes, Absolute 0.50 10*3/mm3      Monocytes, Absolute 0.50 10*3/mm3      Eosinophils, Absolute 0.00 10*3/mm3      Basophils, Absolute 0.00 10*3/mm3      nRBC 0.3 /100 WBC     Lactic Acid, Reflex Timer (This will reflex a repeat order 3-3:15 hours after ordered.) [[id number]] Collected:  12/10/19 0718    Specimen:  Blood Updated:  12/11/19 0230     Extra Tube Hold for add-ons.     Comment: Auto resulted.       POC Lactate  [[id number]]  (Abnormal) Collected:  12/10/19 0718    Specimen:  Blood Updated:  12/10/19 2330     Lactate 3.2 mmol/L      Comment: Serial Number: [id number]:  [id number]       POC Glucose Once [[id number]]  (Abnormal) Collected:  12/10/19 2053    Specimen:  Blood Updated:  12/10/19 2055     Glucose 168 mg/dL      Comment: Serial Number: [id number]:  [id number]       Alpha - 1 - Antitrypsin [[id number]]  (Abnormal) Collected:  12/10/19 1314    Specimen:  Blood Updated:  12/10/19 1720     ALPHA -1 ANTITRYPSIN 244 mg/dL     Ceruloplasmin [[id number]]  (Normal) Collected:  12/10/19 0422    Specimen:  Blood Updated:  12/10/19 1707     Ceruloplasmin 26 mg/dL     AFP Tumor Marker [[id number]]  (Normal) Collected:  12/10/19 1314    Specimen:  Blood Updated:  12/10/19 1703     ALPHA-FETOPROTEIN 2.47 ng/mL     Narrative:       Alpha Fetoprotein Tumor Marker Reference Range:    0.0-8.3 ng/mL    Note: Normal values apply only to males and nonpregnant females. These results are not interpretable for pregnant females.    POC Glucose Once [[id number]]  (Abnormal) Collected:  12/10/19 1636    Specimen:  Blood Updated:  12/10/19 1647     Glucose 138 mg/dL      Comment: Serial Number: [id number]:  [id number]       Gamma GT [[id number]]  (Normal) Collected:  12/10/19 0422    Specimen:  Blood Updated:  12/10/19 1632     GGT 38 U/L     Anti-Smooth Muscle Antibody Titer [[id number]] Collected:  12/10/19 1314    Specimen:  Blood Updated:  12/10/19 1326    Anti-microsomal Antibody [[id number]] Collected:  12/10/19 1315    Specimen:  Blood Updated:  12/10/19 1326    Mitochondrial Antibodies, M2 [[id number]] Collected:  12/10/19 1315    Specimen:  Blood Updated:  12/10/19 1326    KIERAN [[id number]] Collected:  12/10/19 1314    Specimen:  Blood Updated:  12/10/19 1326    BNP [[id number]]  (Abnormal) Collected:  12/10/19 0422    Specimen:  Blood Updated:  12/10/19 1319     proBNP >70,000.0 pg/mL     Narrative:       Among patients with dyspnea,  NT-proBNP is highly sensitive for the detection of acute congestive heart failure. In addition NT-proBNP of <300 pg/ml effectively rules out acute congestive heart failure with 99% negative predictive value.      Urine Drug Screen - Urine, Clean Catch [[id number]]  (Abnormal) Collected:  12/10/19 1220    Specimen:  Urine, Clean Catch Updated:  12/10/19 1303     Amphet/Methamphet, Screen Negative     Barbiturates Screen, Urine Negative     Benzodiazepine Screen, Urine Negative     Cocaine Screen, Urine Negative     Opiate Screen Negative     THC, Screen, Urine Negative     Methadone Screen, Urine Negative     Oxycodone Screen, Urine Positive    Narrative:       Negative Thresholds For Drugs Screened:     Amphetamines               500 ng/ml   Barbiturates               200 ng/ml   Benzodiazepines            100 ng/ml   Cocaine                    300 ng/ml   Methadone                  300 ng/ml   Opiates                    300 ng/ml   Oxycodone                  100 ng/ml   THC                        50 ng/ml    The Normal Value for all drugs tested is negative. This report includes final unconfirmed screening results to be used for medical treatment purposes only. Unconfirmed results must not be used for non-medical purposes such as employment or legal testing. Clinical consideration should be applied to any drug of abuse test, particulary when unconfirmed results are used.  All urine drugs of abuse requests without chain of custody are for medical screening purposes only.  False positives are possible.      Ferritin [[id number]]  (Normal) Collected:  12/10/19 0422    Specimen:  Blood Updated:  12/10/19 1151     Ferritin 383.10 ng/mL     Iron [[id number]]  (Normal) Collected:  12/10/19 0422    Specimen:  Blood Updated:  12/10/19 1144     Iron 132 mcg/dL     Blood Culture - Blood, Arm, Left [[id number]] Collected:  12/08/19 1004    Specimen:  Blood from Arm, Left Updated:  12/10/19 1053     Blood Culture No growth at 2  days    Blood Culture - Blood, Wrist, Right [[id number]] Collected:  12/08/19 1005    Specimen:  Blood from Wrist, Right Updated:  12/10/19 1053     Blood Culture No growth at 2 days    Ammonia [[id number]]  (Normal) Collected:  12/10/19 0858    Specimen:  Blood Updated:  12/10/19 0936     Ammonia 54 umol/L     Protime-INR [[id number]]  (Abnormal) Collected:  12/10/19 0914    Specimen:  Blood Updated:  12/10/19 0925     Protime 14.8 Seconds      INR 1.50          Imaging Results (Last 24 Hours)     Procedure Component Value Units Date/Time    US Liver [[id number]] Collected:  12/11/19 0027     Updated:  12/11/19 0229    Narrative:       Exam: Liver ultrasound    INDICATION: Cirrhosis.  Check for cancer    FINDINGS:  Liver is inhomogeneous consistent with cirrhosis.  There is moderate ascites.  However, no mass lesions are noted.  Portal vein is patent.    Call bladder wall is thickened measuring 10 mm.  No obvious gallstones.  Common bile duct not visualized.      Impression:       1.  Cirrhosis with moderate ascites.  2.  There is a thickened gallbladder wall and gallstones.  This can be a sign of cholecystitis.  If there are signs or symptoms of cholecystitis consider HIDA scan to check gallbladder function.    Electronically signed by:  Serafin Carrillo M.D.    12/11/2019 12:28 AM    XR Chest 1 View [[id number]] Collected:  12/10/19 1344     Updated:  12/10/19 1349    Narrative:       DATE OF EXAM:  12/10/2019 1:17 PM     PROCEDURE:  XR CHEST 1 VW-     INDICATIONS:  hypoxia; N17.9-Acute kidney failure, unspecified; N18.9-Chronic kidney  disease, unspecified; D64.9-Anemia, unspecified     COMPARISON:  Chest radiographs 12/6/2019 and 8/21/2019. CT abdomen pelvis 3/10/2019.  CT chest 5/19/2018.     TECHNIQUE:   Single radiographic AP view of the chest was obtained.     FINDINGS:  The study is limited by lordotic patient positioning. Overlying  artifacts. Stable sternotomy wires and postoperative changes in  the  mediastinum. Low lung volumes with elevation of the right hemidiaphragm.  Stable patchy hazy opacities in both lungs. No pneumothorax. Unchanged  cardiomediastinal contours. No acute osseous abnormality is identified.        Impression:       Limited study demonstrating stable patchy hazy opacities in both lungs,  which could represent edema, atypical pneumonia, or chronic lung  disease.     Electronically Signed By-Allan Salomon On:12/10/2019 1:47 PM  This report was finalized on [id number] by  Allan Salomon, .            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  Liver serologies have been unremarkable thus far.  Await further serologies.  Right upper quadrant ultrasound shows cirrhosis with moderate amount of ascites.  No evidence of liver lesion.  AFP normal.  Paracentesis has been ordered, however it has not yet been performed as the patient was on Arixtra.  Await fluid studies from paracentesis.  Nephrology is following and managing diuretics.  The patient does have altered mental status, however ammonia level is normal.  Continue Xifaxan.  CT head unremarkable.  MELD 15.  Patient will need outpatient EGD to screen for varices.   Encourage nutrition. Continue Renal diet.   If patient is not taking PO, consider dobhoff tube placement for nutrition.   Continue supportive care.     LESLYE Aguilar  12/11/19  9:03 AM

## 2019-12-11 NOTE — PROGRESS NOTES
[facility]   INPATIENT PROGRESS NOTE    Date of Admission: 12/6/2019  Length of Stay: 4  Primary Care Physician: Lori Hale NP    Subjective    Confused on and off  Dosing due to haldol given this am  Subjective   CC: worsening renal function    HPI:    39 y.o. male  With complex medical history of CAPS, recurrent PE and DVT on arixtra, L foot amputation due to clot, s/p LLE bypass surgery, chronic hypoxia on home O2 24/7, liver cirrhosis with ascites, s/p paracentesis twice, SLE and CKD stage 3 who was sent in by  due to worsening renal function.Cr 3.9. Baseline 1.6-1.7.   He denies any change in his health but reports worsening ascites lately. Denies weight gain. Unable to eat much due to easy satiety due to ascites. But still eating and drinking home. Denies abdominal pain or fever  No JVD. Trace leg edema.      Labs reviewed. Chronic anemia steady. No sign of volume overload but will do CXR. No acidosis. Normal LFTs  Vs- low BP 90-100s on multiple BP regimen, no fever. No tachycardia     Pt was admitted for further management.     Review Of Systems:   Constitutional: Positive for activity change, appetite change and fatigue.   HENT: Negative.    Eyes: Negative.    Respiratory: Positive for shortness of breath.    Cardiovascular: Negative.    Gastrointestinal: Positive for abdominal distention.   Endocrine: Negative.    Genitourinary: Negative.    Musculoskeletal: Positive for arthralgias, gait problem and myalgias.   Skin: Negative.    Allergic/Immunologic: Negative.    Neurological: Positive for weakness.   Hematological: Negative.    Psychiatric/Behavioral: Positive for confusion    Objective    Confused and combative more at night     Objective    Physical Exam:  Temp:  [97.3 °F (36.3 °C)-98.2 °F (36.8 °C)] 97.4 °F (36.3 °C)  Heart Rate:  [67-95] 81  Resp:  [15-23] 23  BP: (140-157)/() 140/98    Constitutional: He is oriented to person, place, and time. He appears well-developed. No  distress.   HENT:   Head: Normocephalic and atraumatic.   Nose: Nose normal.   Mouth/Throat: No oropharyngeal exudate.   Eyes: Conjunctivae and EOM are normal. Pupils are equal, round, and reactive to light.   Neck: Normal range of motion. Neck supple.   Cardiovascular: Normal rate and regular rhythm.   Murmur heard.  Pulmonary/Chest: Effort normal and breath sounds normal. No respiratory distress. He has no wheezes. He has no rales. He exhibits no tenderness.   Abdominal: Soft. Bowel sounds are normal. He exhibits distension, worse due to ascites. There is no tenderness. There is no rebound.   Musculoskeletal: Normal range of motion.   Neurological: He is alert and oriented to person, place, and time. No cranial nerve deficit.   L foot MTA   Skin: Skin is warm and dry.   Very poor pedal pulse in both feet   Psychiatric: He has a normal mood and affect. His behavior is normal. Judgment and thought content normal    Results Review:    I have personally reviewed most recent cardiac tracings, lab results and radiology images and interpretations and agree with findings, most notably:  .      Results from last 7 days   Lab Units 12/11/19  0238 12/10/19  0914 12/10/19  0718 12/10/19  0422 12/09/19  0437   WBC 10*3/mm3 5.10  --   --  5.10 6.30   HEMOGLOBIN g/dL 8.2*  --   --  8.2* 8.4*   HEMOGLOBIN, POC g/dL  --   --  10.6*  --   --    HEMATOCRIT % 25.8*  --   --  26.2* 25.8*   HEMATOCRIT POC %  --   --  31*  --   --    PLATELETS 10*3/mm3 52*  --   --  50* 94*   INR  1.38* 1.50*  --   --   --      Results from last 7 days   Lab Units 12/11/19  0345 12/10/19  0422 12/09/19  0458   SODIUM mmol/L 144 142 140   POTASSIUM mmol/L 4.5  4.5 4.4 4.4   CHLORIDE mmol/L 105 103 100   CO2 mmol/L 27.0 26.0 25.0   BUN mg/dL 96* 93* 92*   CREATININE mg/dL 2.57* 2.87* 3.19*   GLUCOSE mg/dL 113* 141* 145*   CALCIUM mg/dL 9.1 8.7 8.9   ALK PHOS U/L 67 75 82   ALT (SGPT) U/L 8 9 10   AST (SGOT) U/L 8 9 8     TSH (uIU/mL)   Date/Time Value    12/07/2019 0837 2.780     Microbiology Results Abnormal     Procedure Component Value - Date/Time    Blood Culture - Blood, Arm, Left [[id number]] Collected:  12/08/19 1004    Lab Status:  Preliminary result Specimen:  Blood from Arm, Left Updated:  12/11/19 1053     Blood Culture No growth at 3 days    Blood Culture - Blood, Wrist, Right [[id number]] Collected:  12/08/19 1005    Lab Status:  Preliminary result Specimen:  Blood from Wrist, Right Updated:  12/11/19 1053     Blood Culture No growth at 3 days    Wound Culture - Wound, Leg, Left [[id number]]  (Abnormal)  (Susceptibility) Collected:  12/08/19 1613    Lab Status:  Final result Specimen:  Wound from Leg, Left Updated:  12/10/19 0812     Wound Culture Moderate growth (3+) Staphylococcus aureus, MRSA     Comment:   Methicillin resistant Staphylococcus aureus, Patient may be an isolation risk.        Gram Stain Few (2+) WBCs observed - predominantly neutrophils      Occasional Gram positive cocci    Susceptibility      Staphylococcus aureus, MRSA     PAULA     Clindamycin Susceptible     Erythromycin Resistant     Inducible Clindamycin Resistance Negative     Oxacillin Resistant     Penicillin G Resistant     Rifampin Susceptible     Tetracycline Susceptible     Trimethoprim + Sulfamethoxazole Susceptible     Vancomycin Susceptible                Susceptibility Comments     Staphylococcus aureus, MRSA    This isolate does not demonstrate inducible clindamycin resistance in vitro.               Eosinophil Smear - Urine, Urine, Clean Catch [[id number]]  (Normal) Collected:  12/07/19 1824    Lab Status:  Final result Specimen:  Urine, Clean Catch Updated:  12/07/19 1925     Eosinophil Smear 0 % EOS/100 Cells         Ct Head Without Contrast    Result Date: 12/11/2019  No interval change from the previous study with abnormalities as described above.  Electronically Signed By-Bar James On:12/11/2019 1:15 PM This report was finalized on [id number] by  Bar James,  .    Us Liver    Result Date: 12/11/2019  1.  Cirrhosis with moderate ascites. 2.  There is a thickened gallbladder wall and gallstones.  This can be a sign of cholecystitis.  If there are signs or symptoms of cholecystitis consider HIDA scan to check gallbladder function. Electronically signed by:  Serafin Carrillo M.D.  12/11/2019 12:28 AM    Xr Chest 1 View    Result Date: 12/10/2019  Limited study demonstrating stable patchy hazy opacities in both lungs, which could represent edema, atypical pneumonia, or chronic lung disease.  Electronically Signed By-Allan Salomon On:12/10/2019 1:47 PM This report was finalized on [id number] by  Allan Salomon, .           I have reviewed the medications.    Assessment/Plan   Assessment/Plan   Brief Hospital Course:      Active Hospital Problems:  * Acute renal failure superimposed on chronic kidney disease (CMS/HCC)- (present on admission)  Etiology; prerenal vs renal /possible lupus/MERYL flare up on CKD stage 3 per renal- on pulse dose steroid for 3 days solumedrol- changed to po prednisone daily  Recently completed zithromax for 5 days for URI sx  Non oliguric per pt  Urine lyte   Hypotensive- on multiple regimen home, hold amlodipine, clonidine but continue on Coreg at low dose  Renal US - no obstruction but mild cortical thinning, echogenic kidney  Fluid and albumin 25g x2    Nephrology on board  Cr trending down           Other ascites- (present on admission)  S/p 2 paracentesis - last one 8/2019  Getting worse gradually recently   May need paracentesis to avoid compression effect from worsening ascites- denies fever or abdominal pain.no leukocytosis. Not likely SBP.  a few dose of albumin  worse in ascites since admission, possibly due to fluid - paracentesis by IR, GI consult          Hyperkalemia- (present on admission)  Due to renal failure  Receive multiple regimen by renal  resolved    Substance abuse in remission (CMS/HCC)- (present on admission)  On subotex due to  addiction issue initially but due to pain control also since he had 2 major lung surgery  Followed by pain clinic- oxy was discontinued  But on Subotex, ketamine     Re-Consult to pain clinic - not available  Watch for constipation for opiate related  Reported taking his own pain med in bag pack- taken away yesterday- since then, confused, suspicious for withdrawal    Anemia, chronic disease- (present on admission)  Due to mild JUNG and ACD  Followed by Dr. Mak for CAPS   Iv and po iron and procrit per oncology  B12 supplement    Amputation of foot (CMS/HCC)  Due to arterial clot in LLE     Acute on chronic respiratory failure with hypoxia and hypercapnia (CMS/HCC)- (present on admission)  Wearing O2, ryann desat at night  Etiology - due to lobectomy of lung or other  nonsmoker   More hypoxic today with mild hypercarbia- placed on bipap  Get cxr for possible volume overload since was on fluid since admission  Check BNP  Getting bumex for possible volume overload    SLE (systemic lupus erythematosus) (CMS/HCC)- (present on admission)  Not on specific med     Deep vein thrombosis (DVT) of lower extremity (CMS/HCC)- (present on admission)  On Arixtra   S/p IVC filter    Benign essential hypertension- (present on admission)  On clonidine, coreg and amlodipine and bumex home - held except low dose coreg  bp improved, off midodrine      Delirium, drug-induced (CMS/HCC)  Suspicious for withdrawal  UDS positive for oxy- he was on a few days ago  Will add zyprexa, dc haldol and seroquel    Secondary hyperparathyroidism (CMS/HCC)- (present on admission)   Due to CKD    Venous stasis ulcer of left calf limited to breakdown of skin without varicose veins (CMS/HCC)- (present on admission)  Seen by ID  Williams discontinued  Wound cx pending  Applied bactroban cream by ID     PVD (peripheral vascular disease) (CMS/HCC)- (present on admission)  Got clot on LLE at the age 14, s/p bypass surgery     Hepatic cirrhosis (CMS/HCC)- (present  on admission)  Etiology- unclear, possibly related to lupus per pt   Normal LFTs    CAPS (catastrophic antiphospholipid syndrome) (CMS/HCC)- (present on admission)  Multiple recurrent PE and DVT -s/p lobectomy, both lung due to clots    S/p plasmapheresis, iv IG, high dose steroid, this year   On arixtra- initially coumadin and lovenox  Dr. Mak - hematologist  Not following with rheumatology          DVT prophylaxis: arixtra  Discharge Planning: I expect patient to be discharged to home in a few days.    Pretty Sevilla MD, 12/08/19 7:10 AM

## 2019-12-11 NOTE — PROGRESS NOTES
Infectious Diseases Progress Note      LOS: 4 days   Patient Care Team:  Lori Hale NP as PCP - General (Family Medicine)  Lori Hale NP as PCP - Claims Attributed  Lori Hale NP as PCP - Family Medicine  Oma Mak MD as Consulting Physician (Hematology and Oncology)  Pito Goodwin MD as Consulting Physician (Nephrology)    Chief Complaint:  Fatigue, right foot pain, confusion    Subjective       The patient has been afebrile for the last 24 hours.  The patient is on room air, hemodynamically stable, and is tolerating antimicrobial therapy.  Apparently patient is still confused and combative and is still in restraints with a sitter at bedside.    Review of Systems:   Review of Systems   Constitutional: Positive for fatigue.   HENT: Negative.    Respiratory: Negative.    Cardiovascular: Negative.    Gastrointestinal: Positive for abdominal distention.   Genitourinary: Negative.    Musculoskeletal: Negative.         Right foot pain   Skin: Positive for wound.   Neurological: Negative.    Psychiatric/Behavioral: Positive for confusion.        Objective     Vital Signs  Temp:  [97.3 °F (36.3 °C)-98.2 °F (36.8 °C)] 97.4 °F (36.3 °C)  Heart Rate:  [67-95] 81  Resp:  [15-23] 23  BP: (140-157)/() 140/98    Physical Exam:  Physical Exam   Constitutional: He appears well-developed and well-nourished.   HENT:   Head: Normocephalic and atraumatic.   Eyes: Pupils are equal, round, and reactive to light. Conjunctivae and EOM are normal.   Neck: Neck supple.   Cardiovascular: Normal rate, regular rhythm and normal heart sounds.   Pulmonary/Chest: Effort normal and breath sounds normal.   Abdominal: Soft. Bowel sounds are normal. He exhibits distension.   Musculoskeletal: Normal range of motion.   Neurological: He is alert.   Patient appears very lethargic, alert and oriented x2   Skin: Skin is warm and dry.   Chronic venous stasis of both lower extremities.  On the left anterior shin  patient has superficial wound with minimal drainage with no significant surrounding erythema.   Vitals reviewed.       Results Review:    I have reviewed all clinical data, test, lab, and imaging results.     Radiology  Us Liver    Result Date: 12/11/2019  Exam: Liver ultrasound INDICATION: Cirrhosis.  Check for cancer FINDINGS: Liver is inhomogeneous consistent with cirrhosis.  There is moderate ascites.  However, no mass lesions are noted.  Portal vein is patent. Call bladder wall is thickened measuring 10 mm.  No obvious gallstones.  Common bile duct not visualized.     1.  Cirrhosis with moderate ascites. 2.  There is a thickened gallbladder wall and gallstones.  This can be a sign of cholecystitis.  If there are signs or symptoms of cholecystitis consider HIDA scan to check gallbladder function. Electronically signed by:  Serafin Carrillo M.D.  12/11/2019 12:28 AM    Xr Chest 1 View    Result Date: 12/10/2019  DATE OF EXAM: 12/10/2019 1:17 PM  PROCEDURE: XR CHEST 1 VW-  INDICATIONS: hypoxia; N17.9-Acute kidney failure, unspecified; N18.9-Chronic kidney disease, unspecified; D64.9-Anemia, unspecified  COMPARISON: Chest radiographs 12/6/2019 and 8/21/2019. CT abdomen pelvis 3/10/2019. CT chest 5/19/2018.  TECHNIQUE: Single radiographic AP view of the chest was obtained.  FINDINGS: The study is limited by lordotic patient positioning. Overlying artifacts. Stable sternotomy wires and postoperative changes in the mediastinum. Low lung volumes with elevation of the right hemidiaphragm. Stable patchy hazy opacities in both lungs. No pneumothorax. Unchanged cardiomediastinal contours. No acute osseous abnormality is identified.      Limited study demonstrating stable patchy hazy opacities in both lungs, which could represent edema, atypical pneumonia, or chronic lung disease.  Electronically Signed By-Allan Salomon On:12/10/2019 1:47 PM This report was finalized on [id number] by  Allan Salomon  .      Cardiology    Laboratory  Results from last 7 days   Lab Units 12/11/19  0238   WBC 10*3/mm3 5.10   HEMOGLOBIN g/dL 8.2*   HEMATOCRIT % 25.8*   PLATELETS 10*3/mm3 52*     Results from last 7 days   Lab Units 12/11/19  0345   SODIUM mmol/L 144   POTASSIUM mmol/L 4.5  4.5   CHLORIDE mmol/L 105   CO2 mmol/L 27.0   BUN mg/dL 96*   CREATININE mg/dL 2.57*   GLUCOSE mg/dL 113*   CALCIUM mg/dL 9.1     Results from last 7 days   Lab Units 12/11/19  0345   SODIUM mmol/L 144   POTASSIUM mmol/L 4.5  4.5   CHLORIDE mmol/L 105   CO2 mmol/L 27.0   BUN mg/dL 96*   CREATININE mg/dL 2.57*   GLUCOSE mg/dL 113*   CALCIUM mg/dL 9.1     Results from last 7 days   Lab Units 12/07/19  0721   CK TOTAL U/L 12*             Microbiology   Microbiology Results (last 10 days)     Procedure Component Value - Date/Time    Wound Culture - Wound, Leg, Left [[id number]]  (Abnormal)  (Susceptibility) Collected:  12/08/19 1613    Lab Status:  Final result Specimen:  Wound from Leg, Left Updated:  12/10/19 0812     Wound Culture Moderate growth (3+) Staphylococcus aureus, MRSA     Comment:   Methicillin resistant Staphylococcus aureus, Patient may be an isolation risk.        Gram Stain Few (2+) WBCs observed - predominantly neutrophils      Occasional Gram positive cocci    Susceptibility      Staphylococcus aureus, MRSA     PAULA     Clindamycin Susceptible     Erythromycin Resistant     Inducible Clindamycin Resistance Negative     Oxacillin Resistant     Penicillin G Resistant     Rifampin Susceptible     Tetracycline Susceptible     Trimethoprim + Sulfamethoxazole Susceptible     Vancomycin Susceptible                Susceptibility Comments     Staphylococcus aureus, MRSA    This isolate does not demonstrate inducible clindamycin resistance in vitro.               Blood Culture - Blood, Wrist, Right [[id number]] Collected:  12/08/19 1005    Lab Status:  Preliminary result Specimen:  Blood from Wrist, Right Updated:  12/11/19 1053     Blood  Culture No growth at 3 days    Blood Culture - Blood, Arm, Left [[id number]] Collected:  12/08/19 1004    Lab Status:  Preliminary result Specimen:  Blood from Arm, Left Updated:  12/11/19 1053     Blood Culture No growth at 3 days    Eosinophil Smear - Urine, Urine, Clean Catch [[id number]]  (Normal) Collected:  12/07/19 1824    Lab Status:  Final result Specimen:  Urine, Clean Catch Updated:  12/07/19 1925     Eosinophil Smear 0 % EOS/100 Cells           Medication Review:       Schedule Meds    buprenorphine 20 mg Sublingual Daily   calcium gluconate 1 g Intravenous Once   calcium-vitamin D 1 tablet Oral BID   carvedilol 3.125 mg Oral BID With Meals   clonazePAM 0.5 mg Oral BID   doxycycline 100 mg Oral Q12H   epoetin candy/candy-epbx 20,000 Units Subcutaneous Q14 Days   febuxostat 40 mg Oral Daily   folic acid 1 mg Oral Daily   fondaparinux 7.5 mg Subcutaneous Daily   gabapentin 300 mg Oral Q8H   insulin lispro 0-7 Units Subcutaneous 4x Daily With Meals & Nightly   lacosamide 100 mg Oral BID   lactulose 20 g Oral TID   levETIRAcetam 500 mg Oral Q12H   mupirocin  Topical Q12H   predniSONE 40 mg Oral Daily With Breakfast   rifAXIMin 550 mg Oral Q12H   sertraline 50 mg Oral Daily   sodium bicarbonate 1,300 mg Oral TID   sodium chloride 10 mL Intravenous Q12H   vitamin B-12 250 mcg Oral Daily       Infusion Meds       PRN Meds  •  baclofen  •  bisacodyl  •  bisacodyl  •  calcium carbonate  •  dextrose  •  dextrose  •  famotidine  •  glucagon (human recombinant)  •  haloperidol lactate  •  insulin lispro **AND** insulin lispro  •  ketamine (KETALAR) infusion **AND** Ketamine Vital Signs & Assessment  •  melatonin  •  nitroglycerin  •  ondansetron **OR** ondansetron  •  prochlorperazine  •  QUEtiapine  •  rOPINIRole  •  [COMPLETED] Insert peripheral IV **AND** sodium chloride  •  sodium chloride        Assessment/Plan       Antimicrobial Therapy   1.  P.o. doxycycline    Day  2  2.      Day  3.      Day  4.      Day  5.      Day      Assessment     Left lower extremity venous stasis wound with possible mild infection if any.  -Culture grew methicillin resistant Staphylococcus aureus, although the wound is very superficial     Chronic kidney disease with acute kidney failure on the top of that.  Most likely secondary to lupus nephritis     History of lupus anticoagulant.  Patient has thrombosis of the lower extremities in the past and has left foot transmetatarsal amputation     Jimenez    Acute confusion and combativeness     Plan    Bactroban ointment twice daily on the left leg wound  Start p.o. doxycycline 100 mg twice daily for 7 days  Wound care  Continue supportive care  Not much more to add from infectious disease standpoint-we will sign off at this time-please call with any questions      LESLYE Landers  12/11/19  11:35 AM

## 2019-12-12 LAB
ALBUMIN SERPL-MCNC: 3.7 G/DL (ref 3.5–5.2)
ALBUMIN/GLOB SERPL: 1.4 G/DL
ALP SERPL-CCNC: 65 U/L (ref 39–117)
ALT SERPL W P-5'-P-CCNC: 8 U/L (ref 1–41)
AMMONIA BLD-SCNC: 66 UMOL/L (ref 16–60)
ANION GAP SERPL CALCULATED.3IONS-SCNC: 9 MMOL/L (ref 5–15)
AST SERPL-CCNC: 9 U/L (ref 1–40)
BASOPHILS # BLD AUTO: 0 10*3/MM3 (ref 0–0.2)
BASOPHILS NFR BLD AUTO: 0.2 % (ref 0–1.5)
BILIRUB SERPL-MCNC: 0.2 MG/DL (ref 0.2–1.2)
BUN BLD-MCNC: 85 MG/DL (ref 6–20)
BUN/CREAT SERPL: 38.5 (ref 7–25)
CA-I SERPL ISE-MCNC: 1.25 MMOL/L (ref 1.2–1.3)
CALCIUM SPEC-SCNC: 8.8 MG/DL (ref 8.6–10.5)
CENTROMERE B AB SER-ACNC: <0.2 AI (ref 0–0.9)
CHLORIDE SERPL-SCNC: 105 MMOL/L (ref 98–107)
CHROMATIN AB SERPL-ACNC: 1.9 AI (ref 0–0.9)
CO2 SERPL-SCNC: 29 MMOL/L (ref 22–29)
CREAT BLD-MCNC: 2.21 MG/DL (ref 0.76–1.27)
DEPRECATED RDW RBC AUTO: 52.9 FL (ref 37–54)
DSDNA AB SER-ACNC: 30 IU/ML (ref 0–9)
ENA JO1 AB SER-ACNC: <0.2 AI (ref 0–0.9)
ENA RNP AB SER-ACNC: <0.2 AI (ref 0–0.9)
ENA SCL70 AB SER-ACNC: <0.2 AI (ref 0–0.9)
ENA SM AB SER-ACNC: <0.2 AI (ref 0–0.9)
ENA SS-A AB SER-ACNC: 0.2 AI (ref 0–0.9)
ENA SS-B AB SER-ACNC: <0.2 AI (ref 0–0.9)
EOSINOPHIL # BLD AUTO: 0 10*3/MM3 (ref 0–0.4)
EOSINOPHIL NFR BLD AUTO: 0.1 % (ref 0.3–6.2)
ERYTHROCYTE [DISTWIDTH] IN BLOOD BY AUTOMATED COUNT: 15.9 % (ref 12.3–15.4)
GFR SERPL CREATININE-BSD FRML MDRD: 34 ML/MIN/1.73
GLOBULIN UR ELPH-MCNC: 2.7 GM/DL
GLUCOSE BLD-MCNC: 159 MG/DL (ref 65–99)
GLUCOSE BLDC GLUCOMTR-MCNC: 136 MG/DL (ref 70–105)
GLUCOSE BLDC GLUCOMTR-MCNC: 192 MG/DL (ref 70–105)
GLUCOSE BLDC GLUCOMTR-MCNC: 197 MG/DL (ref 70–105)
GLUCOSE BLDC GLUCOMTR-MCNC: 92 MG/DL (ref 70–105)
HCT VFR BLD AUTO: 25.2 % (ref 37.5–51)
HGB BLD-MCNC: 8 G/DL (ref 13–17.7)
INR PPP: 1.36 (ref 0.9–1.1)
LYMPHOCYTES # BLD AUTO: 0.6 10*3/MM3 (ref 0.7–3.1)
LYMPHOCYTES NFR BLD AUTO: 12.1 % (ref 19.6–45.3)
Lab: ABNORMAL
MAGNESIUM SERPL-MCNC: 2.5 MG/DL (ref 1.6–2.6)
MCH RBC QN AUTO: 29.4 PG (ref 26.6–33)
MCHC RBC AUTO-ENTMCNC: 31.7 G/DL (ref 31.5–35.7)
MCV RBC AUTO: 92.7 FL (ref 79–97)
MONOCYTES # BLD AUTO: 0.5 10*3/MM3 (ref 0.1–0.9)
MONOCYTES NFR BLD AUTO: 9.5 % (ref 5–12)
NEUTROPHILS # BLD AUTO: 4.2 10*3/MM3 (ref 1.7–7)
NEUTROPHILS NFR BLD AUTO: 78.1 % (ref 42.7–76)
NRBC BLD AUTO-RTO: 0.4 /100 WBC (ref 0–0.2)
PHOSPHATE SERPL-MCNC: 3 MG/DL (ref 2.5–4.5)
PLATELET # BLD AUTO: 51 10*3/MM3 (ref 140–450)
PMV BLD AUTO: 8.3 FL (ref 6–12)
POTASSIUM BLD-SCNC: 4.5 MMOL/L (ref 3.5–5.2)
PROT SERPL-MCNC: 6.4 G/DL (ref 6–8.5)
PROTHROMBIN TIME: 13.6 SECONDS (ref 9.6–11.7)
RBC # BLD AUTO: 2.72 10*6/MM3 (ref 4.14–5.8)
RIBOSOMAL P AB SER-ACNC: 0.4 AI (ref 0–0.9)
RIBOSOMAL P AB SER-ACNC: <0.2 AI (ref 0–0.9)
SODIUM BLD-SCNC: 143 MMOL/L (ref 136–145)
WBC NRBC COR # BLD: 5.4 10*3/MM3 (ref 3.4–10.8)

## 2019-12-12 PROCEDURE — 25010000002 FONDAPARINUX PER 0.5 MG: Performed by: INTERNAL MEDICINE

## 2019-12-12 PROCEDURE — 84100 ASSAY OF PHOSPHORUS: CPT | Performed by: INTERNAL MEDICINE

## 2019-12-12 PROCEDURE — 99233 SBSQ HOSP IP/OBS HIGH 50: CPT | Performed by: INTERNAL MEDICINE

## 2019-12-12 PROCEDURE — 83735 ASSAY OF MAGNESIUM: CPT | Performed by: INTERNAL MEDICINE

## 2019-12-12 PROCEDURE — 94660 CPAP INITIATION&MGMT: CPT

## 2019-12-12 PROCEDURE — 25010000002 ALBUMIN HUMAN 25% PER 50 ML: Performed by: INTERNAL MEDICINE

## 2019-12-12 PROCEDURE — 99232 SBSQ HOSP IP/OBS MODERATE 35: CPT | Performed by: INTERNAL MEDICINE

## 2019-12-12 PROCEDURE — 94799 UNLISTED PULMONARY SVC/PX: CPT

## 2019-12-12 PROCEDURE — 80053 COMPREHEN METABOLIC PANEL: CPT | Performed by: INTERNAL MEDICINE

## 2019-12-12 PROCEDURE — P9047 ALBUMIN (HUMAN), 25%, 50ML: HCPCS | Performed by: INTERNAL MEDICINE

## 2019-12-12 PROCEDURE — 82962 GLUCOSE BLOOD TEST: CPT

## 2019-12-12 PROCEDURE — 82330 ASSAY OF CALCIUM: CPT | Performed by: INTERNAL MEDICINE

## 2019-12-12 PROCEDURE — 85610 PROTHROMBIN TIME: CPT | Performed by: NURSE PRACTITIONER

## 2019-12-12 PROCEDURE — 63710000001 PREDNISONE PER 1 MG: Performed by: INTERNAL MEDICINE

## 2019-12-12 PROCEDURE — 85025 COMPLETE CBC W/AUTO DIFF WBC: CPT | Performed by: NURSE PRACTITIONER

## 2019-12-12 PROCEDURE — 63710000001 INSULIN LISPRO (HUMAN) PER 5 UNITS: Performed by: INTERNAL MEDICINE

## 2019-12-12 PROCEDURE — 82140 ASSAY OF AMMONIA: CPT | Performed by: NURSE PRACTITIONER

## 2019-12-12 RX ORDER — CARVEDILOL 6.25 MG/1
6.25 TABLET ORAL 2 TIMES DAILY WITH MEALS
Status: DISCONTINUED | OUTPATIENT
Start: 2019-12-12 | End: 2019-12-13

## 2019-12-12 RX ORDER — OLANZAPINE 5 MG/1
5 TABLET ORAL 2 TIMES DAILY PRN
Status: DISCONTINUED | OUTPATIENT
Start: 2019-12-12 | End: 2019-12-13 | Stop reason: HOSPADM

## 2019-12-12 RX ORDER — PANTOPRAZOLE SODIUM 40 MG/1
40 TABLET, DELAYED RELEASE ORAL
Status: DISCONTINUED | OUTPATIENT
Start: 2019-12-13 | End: 2019-12-13 | Stop reason: HOSPADM

## 2019-12-12 RX ORDER — SODIUM BICARBONATE 650 MG/1
650 TABLET ORAL 3 TIMES DAILY
Status: DISCONTINUED | OUTPATIENT
Start: 2019-12-12 | End: 2019-12-12

## 2019-12-12 RX ORDER — CARVEDILOL 6.25 MG/1
12.5 TABLET ORAL 2 TIMES DAILY WITH MEALS
Status: DISCONTINUED | OUTPATIENT
Start: 2019-12-12 | End: 2019-12-12

## 2019-12-12 RX ORDER — ALBUMIN (HUMAN) 12.5 G/50ML
25 SOLUTION INTRAVENOUS ONCE
Status: COMPLETED | OUTPATIENT
Start: 2019-12-12 | End: 2019-12-12

## 2019-12-12 RX ORDER — OLANZAPINE 5 MG/1
5 TABLET ORAL ONCE
Status: COMPLETED | OUTPATIENT
Start: 2019-12-12 | End: 2019-12-12

## 2019-12-12 RX ADMIN — LACOSAMIDE 100 MG: 100 TABLET, FILM COATED ORAL at 21:10

## 2019-12-12 RX ADMIN — RIFAXIMIN 550 MG: 550 TABLET ORAL at 08:23

## 2019-12-12 RX ADMIN — DOXYCYCLINE 100 MG: 100 TABLET, FILM COATED ORAL at 17:19

## 2019-12-12 RX ADMIN — RIFAXIMIN 550 MG: 550 TABLET ORAL at 21:10

## 2019-12-12 RX ADMIN — Medication 27 MG: at 02:26

## 2019-12-12 RX ADMIN — FEBUXOSTAT 40 MG: 40 TABLET ORAL at 08:23

## 2019-12-12 RX ADMIN — LACTULOSE 20 G: 10 SOLUTION ORAL at 17:19

## 2019-12-12 RX ADMIN — LACTULOSE 20 G: 10 SOLUTION ORAL at 08:23

## 2019-12-12 RX ADMIN — BUPRENORPHINE HYDROCHLORIDE 20 MG: 8 TABLET SUBLINGUAL at 08:23

## 2019-12-12 RX ADMIN — LEVETIRACETAM 500 MG: 500 TABLET ORAL at 08:23

## 2019-12-12 RX ADMIN — MUPIROCIN 1 APPLICATION: 20 OINTMENT TOPICAL at 08:24

## 2019-12-12 RX ADMIN — OYSTER SHELL CALCIUM WITH VITAMIN D 1 TABLET: 500; 200 TABLET, FILM COATED ORAL at 08:23

## 2019-12-12 RX ADMIN — GABAPENTIN 300 MG: 300 CAPSULE ORAL at 17:19

## 2019-12-12 RX ADMIN — FONDAPARINUX SODIUM 7.5 MG: 7.5 INJECTION, SOLUTION SUBCUTANEOUS at 17:20

## 2019-12-12 RX ADMIN — LEVETIRACETAM 500 MG: 500 TABLET ORAL at 21:10

## 2019-12-12 RX ADMIN — GABAPENTIN 300 MG: 300 CAPSULE ORAL at 06:08

## 2019-12-12 RX ADMIN — DOXYCYCLINE 100 MG: 100 TABLET, FILM COATED ORAL at 06:08

## 2019-12-12 RX ADMIN — FOLIC ACID 1 MG: 1 TABLET ORAL at 08:23

## 2019-12-12 RX ADMIN — SERTRALINE HYDROCHLORIDE 50 MG: 50 TABLET ORAL at 08:23

## 2019-12-12 RX ADMIN — SODIUM BICARBONATE 650 MG TABLET 1300 MG: at 08:23

## 2019-12-12 RX ADMIN — Medication 10 ML: at 08:25

## 2019-12-12 RX ADMIN — CYANOCOBALAMIN TAB 250 MCG 250 MCG: 250 TAB at 08:23

## 2019-12-12 RX ADMIN — OYSTER SHELL CALCIUM WITH VITAMIN D 1 TABLET: 500; 200 TABLET, FILM COATED ORAL at 21:10

## 2019-12-12 RX ADMIN — LACTULOSE 20 G: 10 SOLUTION ORAL at 21:10

## 2019-12-12 RX ADMIN — LACOSAMIDE 100 MG: 100 TABLET, FILM COATED ORAL at 08:23

## 2019-12-12 RX ADMIN — MUPIROCIN: 20 OINTMENT TOPICAL at 21:11

## 2019-12-12 RX ADMIN — CARVEDILOL 6.25 MG: 6.25 TABLET, FILM COATED ORAL at 21:10

## 2019-12-12 RX ADMIN — Medication 10 ML: at 21:11

## 2019-12-12 RX ADMIN — OLANZAPINE 5 MG: 5 TABLET, FILM COATED ORAL at 21:14

## 2019-12-12 RX ADMIN — INSULIN LISPRO 2 UNITS: 100 INJECTION, SOLUTION INTRAVENOUS; SUBCUTANEOUS at 21:11

## 2019-12-12 RX ADMIN — CARVEDILOL 3.12 MG: 3.12 TABLET, FILM COATED ORAL at 08:23

## 2019-12-12 RX ADMIN — CLONAZEPAM 0.5 MG: 0.5 TABLET ORAL at 08:23

## 2019-12-12 RX ADMIN — PREDNISONE 40 MG: 20 TABLET ORAL at 08:23

## 2019-12-12 RX ADMIN — ALBUMIN HUMAN 25 G: 0.25 SOLUTION INTRAVENOUS at 17:19

## 2019-12-12 RX ADMIN — CLONAZEPAM 0.5 MG: 0.5 TABLET ORAL at 21:10

## 2019-12-12 RX ADMIN — GABAPENTIN 300 MG: 300 CAPSULE ORAL at 21:10

## 2019-12-12 NOTE — PLAN OF CARE
Problem: Anemia (Adult)  Goal: Identify Related Risk Factors and Signs and Symptoms  Outcome: Ongoing (interventions implemented as appropriate)  Note:   Continue to monitor   Goal: Symptom Improvement  Outcome: Ongoing (interventions implemented as appropriate)     Problem: Patient Care Overview  Goal: Plan of Care Review  Outcome: Ongoing (interventions implemented as appropriate)  Flowsheets (Taken 12/12/2019 1545)  Plan of Care Reviewed With: patient  Outcome Summary: patient out of restraints and cooperative. patient had paracentesis today and got off 6L  Goal: Individualization and Mutuality  Outcome: Ongoing (interventions implemented as appropriate)  Goal: Discharge Needs Assessment  Outcome: Ongoing (interventions implemented as appropriate)  Goal: Interprofessional Rounds/Family Conf  Outcome: Ongoing (interventions implemented as appropriate)  Flowsheets (Taken 12/12/2019 3171)  Participants: ; nursing; patient; pharmacy; social work/services  Note:   Patient out of restraints. Had paracentesis and got 6 L off.      Problem: Pain, Chronic (Adult)  Goal: Identify Related Risk Factors and Signs and Symptoms  Outcome: Ongoing (interventions implemented as appropriate)  Note:   Continue to monitor   Goal: Acceptable Pain/Comfort Level and Functional Ability  Outcome: Ongoing (interventions implemented as appropriate)     Problem: Fall Risk (Adult)  Goal: Identify Related Risk Factors and Signs and Symptoms  Outcome: Ongoing (interventions implemented as appropriate)  Note:   Continue to monitor      Problem: Restraint, Behavioral (Acute Care)  Goal: Behavioral Restraint: Absence of Injury/Harm  Outcome: Ongoing (interventions implemented as appropriate)  Goal: Behavioral Restraint: Discontinuation Criteria Achieved  Outcome: Ongoing (interventions implemented as appropriate)  Goal: Behavioral Restraint: Preservation of Dignity/Wellbeing  Outcome: Ongoing (interventions implemented as  appropriate)

## 2019-12-12 NOTE — NURSING NOTE
PARACENTESIS    Time Arrived in Department: 0840      Consent: yes  Allergies have been Reviewed: yes      ID Band Verified: yes    Method: Wheelchair    Lab Results: Checked    Physician: Dr. Bagley      Nurse: Lupe Mcqueen RN    IR Care Plan: Person Educated, Current Knowledge, Readiness to Learn , Teaching Method, Teaching Topic and Evaluation of Teaching      Neurological: Within Normal Limits    Skin: Flesh, Warm and Dry    Respiratory Status: Respirations even and enlabored    Room In: 9      Procedure: Paracentesis    Time Out Completed: yes    Time Out: 0910    Prep Time: 0912    Prep Site 1: Right Lateral Abdomen      Prep: Chlorhexidine and Sterile drape    Procedure Position: Right Side    Guidance: Ultrasound    Fluid Quality: Morrison Yellow    Procedure Note: Paracentesis began at 0915        Intra Time 1:0930    Intra Assess 1:   LOC: Alert  Pain:  No Issues  Skin: Flesh, Warm and Dry  Respiratory Status:  Even and Unlabored  Intra Procedure Note 1:         Intra Time 2: 0945    Intra Assess 2:   LOC: Drowsy  Pain: No Issues  Skin: Flesh, Warm and Dry  Respiratory Status: Even and Unlabored  Intra Procedure Note 2:         Intra Time 3: 1010    Intra Assess 3:   LOC: Alert  Pain: No Issues  Skin: Flesh, Warm and Dry  Respiratory Status: Even and Unlabored  Intra Procedure Note 3:para completed                Post-Procedure Position: Supine    Dressing Site 1: 2x2, 4x4 and Covaderm    Post Procedure Status:  Alert and Oriented, returned to baseline, Dressing Dry/ Intact, Stable Condition and Dressing properly labeled    Specimen To Lab: no    Total Fluid Removed: 6 liters    Post Procedure Note:  Patient paracentesis ended at 1010, pt. Tolerated well and was very pleasant        Report Given To: Josefina    Admit/Transfer To: returned back to room 2125    Transfer Time: 1030    Discharge Time:     Method: Wheelchair    O2 on Transfer: no, placed patient back on o2 at 4 liters nasal cannula when returned  to floor    Accompanied By:  Nurse    Clothes Changed:  Self    Discharged Location:  Admit returned to room    Discharge Teaching:  Inpatient

## 2019-12-12 NOTE — PROGRESS NOTES
[facility]   INPATIENT PROGRESS NOTE    Date of Admission: 12/6/2019  Length of Stay: 5  Primary Care Physician: Lori Hale NP    Subjective    S/p paracentesis 6 liter     Subjective   CC: worsening renal function    HPI:    39 y.o. male  With complex medical history of CAPS, recurrent PE and DVT on arixtra, L foot amputation due to clot, s/p LLE bypass surgery, chronic hypoxia on home O2 24/7, liver cirrhosis with ascites, s/p paracentesis twice, SLE and CKD stage 3 who was sent in by  due to worsening renal function.Cr 3.9. Baseline 1.6-1.7.   He denies any change in his health but reports worsening ascites lately. Denies weight gain. Unable to eat much due to easy satiety due to ascites. But still eating and drinking home. Denies abdominal pain or fever  No JVD. Trace leg edema.      Labs reviewed. Chronic anemia steady. No sign of volume overload but will do CXR. No acidosis. Normal LFTs  Vs- low BP 90-100s on multiple BP regimen, no fever. No tachycardia     Pt was admitted for further management.     Review Of Systems:   Constitutional: Positive for activity change, appetite change and fatigue.   HENT: Negative.    Eyes: Negative.    Respiratory: Positive for shortness of breath.    Cardiovascular: Negative.    Gastrointestinal: Positive for abdominal distention.   Endocrine: Negative.    Genitourinary: Negative.    Musculoskeletal: Positive for arthralgias, gait problem and myalgias.   Skin: Negative.    Allergic/Immunologic: Negative.    Neurological: Positive for weakness.   Hematological: Negative.    Psychiatric/Behavioral: Positive for confusion    Objective    Improved in mentation - back to baseline  Feels better after paracentesis  Wanting to go home    Objective    Physical Exam:  Temp:  [97.5 °F (36.4 °C)-98.3 °F (36.8 °C)] 97.8 °F (36.6 °C)  Heart Rate:  [78-97] 97  Resp:  [10-20] 19  BP: (145-170)/() 158/98    Constitutional: He is oriented to person, place, and time. He  appears well-developed. No distress.   HENT:   Head: Normocephalic and atraumatic.   Nose: Nose normal.   Mouth/Throat: No oropharyngeal exudate.   Eyes: Conjunctivae and EOM are normal. Pupils are equal, round, and reactive to light.   Neck: Normal range of motion. Neck supple.   Cardiovascular: Normal rate and regular rhythm.   Murmur heard.  Pulmonary/Chest: Effort normal and breath sounds normal. No respiratory distress. He has no wheezes. He has no rales. He exhibits no tenderness.   Abdominal: Soft. Bowel sounds are normal. He exhibits distension, better after paracentesis, due to ascites. There is no tenderness. There is no rebound.   Musculoskeletal: Normal range of motion. + leg edema  Neurological: He is alert and oriented to person, place, and time. No cranial nerve deficit.   L foot MTA   Skin: Skin is warm and dry.   Very poor pedal pulse in both feet   Psychiatric: He has a normal mood and affect. His behavior is normal. Judgment and thought content normal    Results Review:    I have personally reviewed most recent cardiac tracings, lab results and radiology images and interpretations and agree with findings, most notably:  .      Results from last 7 days   Lab Units 12/12/19  0300 12/11/19  0238 12/10/19  0914 12/10/19  0718 12/10/19  0422   WBC 10*3/mm3 5.40 5.10  --   --  5.10   HEMOGLOBIN g/dL 8.0* 8.2*  --   --  8.2*   HEMOGLOBIN, POC g/dL  --   --   --  10.6*  --    HEMATOCRIT % 25.2* 25.8*  --   --  26.2*   HEMATOCRIT POC %  --   --   --  31*  --    PLATELETS 10*3/mm3 51* 52*  --   --  50*   INR  1.36* 1.38* 1.50*  --   --      Results from last 7 days   Lab Units 12/12/19  0300 12/11/19  0345 12/10/19  0422   SODIUM mmol/L 143 144 142   POTASSIUM mmol/L 4.5 4.5  4.5 4.4   CHLORIDE mmol/L 105 105 103   CO2 mmol/L 29.0 27.0 26.0   BUN mg/dL 85* 96* 93*   CREATININE mg/dL 2.21* 2.57* 2.87*   GLUCOSE mg/dL 159* 113* 141*   CALCIUM mg/dL 8.8 9.1 8.7   ALK PHOS U/L 65 67 75   ALT (SGPT) U/L 8 8 9    AST (SGOT) U/L 9 8 9     TSH (uIU/mL)   Date/Time Value   12/07/2019 0837 2.780     Microbiology Results Abnormal     Procedure Component Value - Date/Time    Blood Culture - Blood, Arm, Left [[id number]] Collected:  12/08/19 1004    Lab Status:  Preliminary result Specimen:  Blood from Arm, Left Updated:  12/12/19 1040     Blood Culture No growth at 4 days    Blood Culture - Blood, Wrist, Right [[id number]] Collected:  12/08/19 1005    Lab Status:  Preliminary result Specimen:  Blood from Wrist, Right Updated:  12/12/19 1040     Blood Culture No growth at 4 days    Wound Culture - Wound, Leg, Left [[id number]]  (Abnormal)  (Susceptibility) Collected:  12/08/19 1613    Lab Status:  Final result Specimen:  Wound from Leg, Left Updated:  12/10/19 0812     Wound Culture Moderate growth (3+) Staphylococcus aureus, MRSA     Comment:   Methicillin resistant Staphylococcus aureus, Patient may be an isolation risk.        Gram Stain Few (2+) WBCs observed - predominantly neutrophils      Occasional Gram positive cocci    Susceptibility      Staphylococcus aureus, MRSA     PAULA     Clindamycin Susceptible     Erythromycin Resistant     Inducible Clindamycin Resistance Negative     Oxacillin Resistant     Penicillin G Resistant     Rifampin Susceptible     Tetracycline Susceptible     Trimethoprim + Sulfamethoxazole Susceptible     Vancomycin Susceptible                Susceptibility Comments     Staphylococcus aureus, MRSA    This isolate does not demonstrate inducible clindamycin resistance in vitro.               Eosinophil Smear - Urine, Urine, Clean Catch [[id number]]  (Normal) Collected:  12/07/19 1824    Lab Status:  Final result Specimen:  Urine, Clean Catch Updated:  12/07/19 1925     Eosinophil Smear 0 % EOS/100 Cells         Ct Head Without Contrast    Result Date: 12/11/2019  No interval change from the previous study with abnormalities as described above.  Electronically Signed By-Bar James On:12/11/2019 1:15 PM  This report was finalized on [id number] by  Bar James, .    Us Liver    Result Date: 12/11/2019  1.  Cirrhosis with moderate ascites. 2.  There is a thickened gallbladder wall and gallstones.  This can be a sign of cholecystitis.  If there are signs or symptoms of cholecystitis consider HIDA scan to check gallbladder function. Electronically signed by:  Serafin Carrillo M.D.  12/11/2019 12:28 AM           I have reviewed the medications.    Assessment/Plan   Assessment/Plan   Brief Hospital Course:      Active Hospital Problems:  * Acute renal failure superimposed on chronic kidney disease (CMS/HCC)- (present on admission)  Etiology; prerenal vs renal /possible lupus/MERYL flare up on CKD stage 3 per renal- on pulse dose steroid for 3 days solumedrol- changed to po prednisone daily  Recently completed zithromax for 5 days for URI sx  Non oliguric per pt  Urine lyte   Hypotensive- on multiple regimen home, hold amlodipine, clonidine but continue on Coreg at low dose  Renal US - no obstruction but mild cortical thinning, echogenic kidney  Fluid and albumin 25g x2    Nephrology on board  Cr trending down  Slowly - had paracentesis         Other ascites- (present on admission)  S/p 2 paracentesis - last one 8/2019  Getting worse gradually recently    worse in ascites since admission, possibly due to fluid - paracentesis by IR- 6 liter off today, albumin 25g given          Hyperkalemia- (present on admission)  Due to renal failure  Receive multiple regimen by renal  resolved    Substance abuse in remission (CMS/HCC)- (present on admission)  On subotex due to addiction issue initially but due to pain control also since he had 2 major lung surgery  Followed by pain clinic- oxy was discontinued  But on Subotex, ketamine     Re-Consult to pain clinic - not available  Watch for constipation for opiate related  Reported taking his own pain med in bag pack- taken away yesterday- since then, confused, suspicious for  withdrawal  Will dc ketamine for suspicious side effect - agitation and aggression am    Anemia, chronic disease- (present on admission)  Due to mild JUNG and ACD  Followed by Dr. Mak for CAPS   Iv and po iron and procrit per oncology  B12 supplement    Amputation of foot (CMS/HCC)  Due to arterial clot in LLE     Acute on chronic respiratory failure with hypoxia and hypercapnia (CMS/HCC)- (present on admission)  Wearing O2, ryann desat at night  Etiology - due to lobectomy of lung or other  nonsmoker   More hypoxic today with mild hypercarbia- placed on bipap   cxr - almost unchanged   BNP elevated  resumed bumex for possible volume overload/home regimen    SLE (systemic lupus erythematosus) (CMS/HCC)- (present on admission)  Not on specific med     Deep vein thrombosis (DVT) of lower extremity (CMS/HCC)- (present on admission)  On Arixtra   S/p IVC filter    Benign essential hypertension- (present on admission)  On clonidine, coreg and amlodipine and bumex home - held except low dose coreg  bp improved, off midodrine      Delirium, drug-induced (CMS/HCC)  Suspicious for withdrawal  Also getting lactulose for possible hepatic encephalopathy  UDS positive for oxy- he was on a few days ago  Got  zyprexa  last night - discontinued since back to baseline in mentation  Discontinued ketamine    Secondary hyperparathyroidism (CMS/HCC)- (present on admission)   Due to CKD    Venous stasis ulcer of left calf limited to breakdown of skin without varicose veins (CMS/HCC)- (present on admission)  Seen by ID, getting doxy for possible infection  Zosyn discontinued  Wound cx - MRSA - on doxy for 7 days per ID  Applied bactroban cream by ID     PVD (peripheral vascular disease) (CMS/HCC)- (present on admission)  Got clot on LLE at the age 14, s/p bypass surgery     Hepatic cirrhosis (CMS/HCC)- (present on admission)  Etiology- unclear, possibly related to lupus per pt   Normal LFTs    CAPS (catastrophic antiphospholipid syndrome)  (CMS/HCC)- (present on admission)  Multiple recurrent PE and DVT -s/p lobectomy, both lung due to clots    S/p plasmapheresis, iv IG, high dose steroid, this year   On arixtra- initially coumadin and lovenox  Dr. Mak - hematologist  Not following with rheumatology          DVT prophylaxis: arixtra  Discharge Planning: I expect patient to be discharged to home if ok with renal tomorrow    Mi Trini Contreras MD, 12/08/19 7:10 AM

## 2019-12-12 NOTE — PROGRESS NOTES
Hematology/Oncology Inpatient Progress Note    PATIENT NAME: Lorenzo Crockett  : 1980  MRN: [id number]    CHIEF COMPLAINT: Pancytopenia    HISTORY OF PRESENT ILLNESS:    39 y.o. male admitted through the ED 2019 after he was instructed to go by the cancer center with abnormal elevation of potassium (5.5) on outpatient labs 2019.  The patient had been instructed to go to the ED 2019.  He reported worsening abdominal distention.  Repeat potassium level was normal (5.0) but creatinine level was rising (3.92) from baseline.  Nephrology was consulted and the patient has known CKD secondary to SLE/MERYL.  LFTs were okay.  CBC showed WBC 6.6, hemoglobin 8.6, MCV 90.7, and platelets 118,000.  Chest x-ray showed no change in alveolar and airspace opacities bilaterally as compared with 2019.  Renal ultrasound was negative for hydronephrosis and showed markedly echogenic kidneys with mild cortical thickening and moderate ascites. On 19 he was started on daily Solu-Medrol. On 2019 CBC showed WBC 3.2, hemoglobin 8.4, and platelets 106,000.      19  Hematology/Oncology was consulted as the patient is known to our service for thrombocytopenia and thrombophilia.  He was initially evaluated by Dr. Mak in .  He was anemic at that time and diagnosed with JUNG.  Thrombocytopenia was felt due to medication (risperidone) and concern for bone marrow suppression secondary to lupus treatment.  He was also noted at that time to have bulky adenopathy on CT scans with PET ordered but not completed due to patient noncompliance.  He was lost to follow-up and seen again in 2017 for thrombocytopenia during hospital admission.  Thrombocytopenia stabilized and no etiology was detected.  He had a history of positive lupus anticoagulant and was continued on Arixtra and had undergone an IVC filter placement at some point.  Anemia again was felt due to JUNG and ACD.  He was lost to follow-up  again until consulted during an additional hospitalization in 2019.  He had been evaluated by rheumatology and diagnosed with catastrophic antiphospholipid syndrome treated with plasma exchange x5 and high-dose steroids along with a brief period of dialysis.  On 3/11/2019 hematologic parameters including haptoglobin, normal at 64.  .  Ferritin 32.  Complement C3, C4 was low.  Serum iron was low at 27.  .  Viral hepatitis panel was nonreactive.  B12 was more than 1,500.  Folate was more than 24.  He received IV IgG 40 g on 4/4, 4/5, and 4/8/2019.  He received Injectafer x2 doses with last given 4/15/2019.   He was last seen as an outpatient on 12/4/2019 where CBC revealed WBC 6.49, hemoglobin 8.4, and platelets 89,000.  He complained of chest pain and oxygen saturation falling into the 60s when off home oxygen and was encouraged to go to the ED.  For his thrombophilia he was noted to have had an IVC filter placed with most recent anticoagulation of Arixtra 7.5 mg subcu daily. Thrombocytopenia was felt likely secondary to ITP and underlying autoimmune disease with baseline platelet count 100,000. For his multifactorial anemia including JUNG he was encouraged to see GI and plans were to continue as needed Injectafer with the addition of Procrit if needed secondary to CKD.  Vitamin B12 deficiency was noted in August 2019.  He was on oral replacement and this was changed to injectable.  On 12/4/2019 iron studies showed iron 33 (), and iron saturation 13% (20-50), TIBC 252 (298-536), and ferritin 279 ().     PCP: Lori Hale NP    Subjective Complains about pain mostly in the back    ROS:  Review of Systems   Constitutional: Negative for chills and fever.   HENT: Negative for ear pain, mouth sores, nosebleeds and sore throat.    Eyes: Negative for photophobia and visual disturbance.   Respiratory: Negative for wheezing and stridor.    Cardiovascular: Negative for chest pain and palpitations.    Gastrointestinal: Negative for abdominal pain, diarrhea, nausea and vomiting.   Endocrine: Negative for cold intolerance and heat intolerance.   Genitourinary: Negative for dysuria and hematuria.   Musculoskeletal: Negative for joint swelling and neck stiffness.   Skin: Negative for color change and rash.   Neurological: Negative for seizures and syncope.   Hematological: Negative for adenopathy.        No obvious bleeding   Psychiatric/Behavioral: Negative for agitation, confusion and hallucinations.        MEDICATIONS:    Scheduled Meds:      albumin human 25 g Intravenous Once   buprenorphine 20 mg Sublingual Daily   calcium-vitamin D 1 tablet Oral BID   carvedilol 12.5 mg Oral BID With Meals   clonazePAM 0.5 mg Oral BID   doxycycline 100 mg Oral Q12H   epoetin candy/candy-epbx 20,000 Units Subcutaneous Q14 Days   febuxostat 40 mg Oral Daily   folic acid 1 mg Oral Daily   fondaparinux 7.5 mg Subcutaneous Daily   gabapentin 300 mg Oral Q8H   insulin lispro 0-7 Units Subcutaneous 4x Daily With Meals & Nightly   lacosamide 100 mg Oral BID   lactulose 20 g Oral TID   levETIRAcetam 500 mg Oral Q12H   mupirocin  Topical Q12H   OLANZapine 5 mg Oral Nightly   predniSONE 40 mg Oral Daily With Breakfast   rifAXIMin 550 mg Oral Q12H   sertraline 50 mg Oral Daily   sodium chloride 10 mL Intravenous Q12H   vitamin B-12 250 mcg Oral Daily      Continuous Infusions:        PRN Meds:  bisacodyl  •  bisacodyl  •  calcium carbonate  •  dextrose  •  dextrose  •  famotidine  •  glucagon (human recombinant)  •  insulin lispro **AND** insulin lispro  •  ketamine (KETALAR) infusion **AND** Ketamine Vital Signs & Assessment  •  melatonin  •  nitroglycerin  •  OLANZapine zydis  •  ondansetron **OR** ondansetron  •  prochlorperazine  •  rOPINIRole  •  [COMPLETED] Insert peripheral IV **AND** sodium chloride  •  sodium chloride     ALLERGIES:    Allergies   Allergen Reactions   • Tramadol Other (See Comments)     seizure   • Melon Rash  "      Objective    VITALS:   /98   Pulse 88   Temp 97.5 °F (36.4 °C)   Resp 19   Ht 185.4 cm (73\")   Wt 90.8 kg (200 lb 2.8 oz)   SpO2 98%   BMI 26.41 kg/m²     PHYSICAL EXAM:  Physical Exam   Constitutional: He is oriented to person, place, and time. No distress.   Frail   HENT:   Head: Normocephalic and atraumatic.   Eyes: Conjunctivae and EOM are normal. Right eye exhibits no discharge. Left eye exhibits no discharge. No scleral icterus.   Neck: Normal range of motion. Neck supple. No thyromegaly present.   Cardiovascular: Normal rate, regular rhythm and normal heart sounds. Exam reveals no gallop and no friction rub.   Pulmonary/Chest: Effort normal. No stridor. No respiratory distress. He has no wheezes.   Diminished breath sounds bilaterally   Abdominal: Soft. Bowel sounds are normal. He exhibits no mass. There is no tenderness. There is no rebound and no guarding.   Musculoskeletal: Normal range of motion. He exhibits deformity ( History of LLE partial amputation). He exhibits no tenderness.   Lymphadenopathy:     He has no cervical adenopathy.   Neurological: He is alert and oriented to person, place, and time. He exhibits normal muscle tone.   Skin: Skin is warm. No rash noted. He is not diaphoretic. No erythema.   Psychiatric: He has a normal mood and affect. His behavior is normal.   Nursing note and vitals reviewed.        RECENT LABS:  Lab Results (last 24 hours)     Procedure Component Value Units Date/Time    KIERAN Comprehensive Plus Profile [[id number]]  (Abnormal) Collected:  12/10/19 1314    Specimen:  Blood Updated:  12/12/19 1209     Anti-DNA (DS) Ab Qn 30 IU/mL      Comment:                                    Negative      <5                                     Equivocal  5 - 9                                     Positive      >9        RNP Antibodies <0.2 AI      Encarnacion Antibodies <0.2 AI      Encarnacion/RNP Antibodies 0.4 AI      Antiscleroderma-70 Antibodies <0.2 AI      MATHEUS SSA (RO) Ab " 0.2 AI      MATHEUS SSB (LA) Ab <0.2 AI      Antichromatin Antibodies 1.9 AI      Antiribosomal P Antibodies <0.2 AI      AMBER-1 IgG <0.2 AI      Anti-Centromere B Antibodies <0.2 AI      See below: Comment     Comment: Autoantibody                       Disease Association  ____________________________________________________________                          Condition                  Frequency  _____________________   ________________________   _________  Antinuclear Antibody,    SLE, mixed connective  Direct (KIERAN-D)           tissue diseases  _____________________   ________________________   _________  dsDNA                    SLE                        40 - 60%  _____________________   ________________________   _________  Chromatin                Drug induced SLE                90%                           SLE                        48 - 97%  _____________________   ________________________   _________  SSA (Ro)                 SLE                        25 - 35%                           Sjogren's Syndrome         40 - 70%                            Lupus                 100%  _____________________   ________________________   _________  SSB (La)                 SLE                             10%                           Sjogren's Syndrome              30%  _____________________   _______________________    _________  Sm (anti-Smith)          SLE                        15 - 30%  _____________________   _______________________    _________  RNP                      Mixed Connective Tissue                           Disease                         95%  (U1 nRNP,                SLE                        30 - 50%  anti-ribonucleoprotein)  Polymyositis and/or                           Dermatomyositis                 20%  _____________________   ________________________   _________  Scl-70 (antiDNA          Scleroderma (diffuse)      20 - 35%  topoisomerase)           Crest                            13%  _____________________   ________________________   _________  Samara-1                     Polymyositis and/or                           Dermatomyositis            20 - 40%  _____________________   ________________________   _________  Centromere B             Scleroderma - Crest                           variant                         80%  _____________________   ________________________   _________  Ribosomal P              SLE                        10 - 20%       Narrative:       Performed at:  [address] [place] [address]  [id number]  : Jesus Manzo PhD, Phone:  [phone]    POC Glucose Once [[id number]]  (Abnormal) Collected:  12/12/19 1120    Specimen:  Blood Updated:  12/12/19 1124     Glucose 192 mg/dL      Comment: Serial Number: [id number]:  [id number]       Blood Culture - Blood, Arm, Left [[id number]] Collected:  12/08/19 1004    Specimen:  Blood from Arm, Left Updated:  12/12/19 1040     Blood Culture No growth at 4 days    Blood Culture - Blood, Wrist, Right [[id number]] Collected:  12/08/19 1005    Specimen:  Blood from Wrist, Right Updated:  12/12/19 1040     Blood Culture No growth at 4 days    POC Glucose Once [[id number]]  (Normal) Collected:  12/12/19 0723    Specimen:  Blood Updated:  12/12/19 0726     Glucose 92 mg/dL      Comment: Serial Number: [id number]:  [id number]       Phosphorus [[id number]]  (Normal) Collected:  12/12/19 0300    Specimen:  Blood Updated:  12/12/19 0354     Phosphorus 3.0 mg/dL     Comprehensive Metabolic Panel [[id number]]  (Abnormal) Collected:  12/12/19 0300    Specimen:  Blood Updated:  12/12/19 0349     Glucose 159 mg/dL      BUN 85 mg/dL      Creatinine 2.21 mg/dL      Sodium 143 mmol/L      Potassium 4.5 mmol/L      Chloride 105 mmol/L      CO2 29.0 mmol/L      Calcium 8.8 mg/dL      Total Protein 6.4 g/dL      Albumin 3.70 g/dL      ALT (SGPT) 8 U/L      AST (SGOT) 9 U/L      Alkaline Phosphatase 65 U/L      Total  Bilirubin 0.2 mg/dL      eGFR Non African Amer 34 mL/min/1.73      Globulin 2.7 gm/dL      A/G Ratio 1.4 g/dL      BUN/Creatinine Ratio 38.5     Anion Gap 9.0 mmol/L     Narrative:       GFR Normal >60  Chronic Kidney Disease <60  Kidney Failure <15      Magnesium [[id number]]  (Normal) Collected:  12/12/19 0300    Specimen:  Blood Updated:  12/12/19 0349     Magnesium 2.5 mg/dL     Calcium, Ionized [[id number]]  (Normal) Collected:  12/12/19 0300    Specimen:  Blood Updated:  12/12/19 0342     Ionized Calcium 1.25 mmol/L     Ammonia [[id number]]  (Abnormal) Collected:  12/12/19 0300    Specimen:  Blood Updated:  12/12/19 0341     Ammonia 66 umol/L     Protime-INR [[id number]]  (Abnormal) Collected:  12/12/19 0300    Specimen:  Blood Updated:  12/12/19 0333     Protime 13.6 Seconds      INR 1.36    CBC & Differential [[id number]] Collected:  12/12/19 0300    Specimen:  Blood Updated:  12/12/19 0325    Narrative:       The following orders were created for panel order CBC & Differential.  Procedure                               Abnormality         Status                     ---------                               -----------         ------                     CBC Auto Differential[[id number]]        Abnormal            Final result                 Please view results for these tests on the individual orders.    CBC Auto Differential [[id number]]  (Abnormal) Collected:  12/12/19 0300    Specimen:  Blood Updated:  12/12/19 0325     WBC 5.40 10*3/mm3      RBC 2.72 10*6/mm3      Hemoglobin 8.0 g/dL      Hematocrit 25.2 %      MCV 92.7 fL      MCH 29.4 pg      MCHC 31.7 g/dL      RDW 15.9 %      RDW-SD 52.9 fl      MPV 8.3 fL      Platelets 51 10*3/mm3      Neutrophil % 78.1 %      Lymphocyte % 12.1 %      Monocyte % 9.5 %      Eosinophil % 0.1 %      Basophil % 0.2 %      Neutrophils, Absolute 4.20 10*3/mm3      Lymphocytes, Absolute 0.60 10*3/mm3      Monocytes, Absolute 0.50 10*3/mm3      Eosinophils, Absolute 0.00 10*3/mm3       Basophils, Absolute 0.00 10*3/mm3      nRBC 0.4 /100 WBC     POC Glucose Once [[id number]]  (Abnormal) Collected:  12/11/19 2001    Specimen:  Blood Updated:  12/11/19 2002     Glucose 199 mg/dL      Comment: Serial Number: [id number]:  [id number]       POC Glucose Once [[id number]]  (Abnormal) Collected:  12/11/19 1611    Specimen:  Blood Updated:  12/11/19 1612     Glucose 144 mg/dL      Comment: Serial Number: [id number]:  [id number]             PENDING RESULTS: n/a    IMAGING REVIEWED:  Ct Head Without Contrast    Result Date: 12/11/2019  No interval change from the previous study with abnormalities as described above.  Electronically Signed By-Bar James On:12/11/2019 1:15 PM This report was finalized on [id number] by  Bar James, .    Us Liver    Result Date: 12/11/2019  1.  Cirrhosis with moderate ascites. 2.  There is a thickened gallbladder wall and gallstones.  This can be a sign of cholecystitis.  If there are signs or symptoms of cholecystitis consider HIDA scan to check gallbladder function. Electronically signed by:  Serafin Carrillo M.D.  12/11/2019 12:28 AM      I have reviewed the remained stable at 94,000 labs, imaging, reports, and other clinician documentation.    Assessment/Plan   ASSESSMENT:  1. Pancytopenia/chronic thrombocytopenia (possible ITP)/JUNG and ACD secondary to CKD/vitamin B12 deficiency- s/p Venofer, folic acid and on Procrit as inpatient.  On oral B12- injection x1 ordered.  WBC with mild drop post admission possibly related to antibiotics.  Platelets fluctuating some.  With ITP could use IV IgG however this is not available due to backorder and Nplate could be considered but there is concern for clotting with this medication given patient's history.  Stable.  2. Thrombophilia/antiphospholipid syndrome/history of PE- s/p IVC filter. On Arixtra.    3. TABBY on CKD stage III/proteinuria/electrolyte imbalances/Lupus nephritis-Dr. Goodwin managing with TABBY felt secondary  to prerenal state and occasional NSAID use.  Concern for recurrent minimal-change disease. On solumedrol 250mg IV daily for lupus nephritis.  Patient declining renal biopsy.  4. History of minimal-change disease/SLE- chronic SLE.  MCV treated in past with plasma exchange and high-dose steroids.   5. LLE wound - MRSA positive.  Per ID.  6. CUMMINS/ascites- last paracentesis August 2019.  Albumin low.  LFTs ok.  GI consulted by primary team.  Ammonia elevated.  Hep panel neg. INR 1.36-stable.   7. Hypoxia/chronic respiratory failure-oxygen dependent. S/P Zosyn, On doxycycline. Per PMD.   8. Confusion/encephalopathy - CT head neg. Ammonia level elevated-on lactulose.  Primary team note states patient had been taking his own pain medication from home, taken away, now suspicious for withdrawal.  9. H/O drug addiction-per PMD.     PLAN:  1. IV IgG unavailable due to backorder and hesitant to use N-plate with risk of clotting given antiphospholipid syndrome.   2. Rheumatology does not provide inpatient consults.  3. Continue Solu-Medrol.  4. Continue Arixtra and Procrit/Retacrit.  5. Daily CBC.  6. Continue Vitamin B12 1000 mcg IM monthly as outpatient.  7. Protonix.           Note updated by LESLYE Alberts.  Patient seen and examined by Dr. Mak.  Electronically signed by LESLYE Shell, 12/12/19, 3:00 PM.    I have personally performed a face-to-face diagnostic evaluation on this patient.  I have reviewed and agree with the care plan.  Notes reviewed and edited.  Discussed with nurse practitioner.  Platelets remains are lower today. Continue steroids. Follow heme parameters. IVIG not available.  He feels better overall.  We will continue to follow.  I discussed the patients findings and my recommendations with patient    Oma Mak MD  12/12/19  2:54 PM

## 2019-12-12 NOTE — PLAN OF CARE
Problem: Patient Care Overview  Goal: Plan of Care Review  Outcome: Ongoing (interventions implemented as appropriate)   Patient more alert this shift. Patient complains of generalized pain and requests ketamine multiple times. Restraints have been discontinued but there is still a sitter at bedside. Patient has been pretty calm but occasionally becomes hateful to nursing staff.

## 2019-12-12 NOTE — PROGRESS NOTES
Continued Stay Note  REINIER Ward     Patient Name: Lorenzo Crockett  MRN: [id number]  Today's Date: 12/12/2019    Admit Date: 12/6/2019    Discharge Plan     Row Name 12/12/19 1417       Plan    Plan Comments  Per Dr. Rodas's note, no need to arrange outpt HD at this time but will continue to watch for need. Patient had paracentesis this morning.      Francheska Carvalho RN       Office Phone: [phone]  Office Cell: [phone]

## 2019-12-12 NOTE — PROGRESS NOTES
"NEPHROLOGY PROGRESS NOTE------[facility]    [facility]  [phone]  Kennedy Rodas MD      Patient Care Team:  Lori Hale NP as PCP - General (Family Medicine)  Lori Hale NP as PCP - Claims Attributed  Lori Hale NP as PCP - Family Medicine  Oma Mak MD as Consulting Physician (Hematology and Oncology)  Pito Goodwin MD as Consulting Physician (Nephrology)      Provider:  Kennedy Rodas MD  Patient Name: Lorenzo Crockett  :  1980    SUBJECTIVE:    F/u TABBY/CKD   No SOB, CP, dysuria    Medication:    buprenorphine 20 mg Sublingual Daily   calcium gluconate 1 g Intravenous Once   calcium-vitamin D 1 tablet Oral BID   carvedilol 3.125 mg Oral BID With Meals   clonazePAM 0.5 mg Oral BID   doxycycline 100 mg Oral Q12H   epoetin candy/candy-epbx 20,000 Units Subcutaneous Q14 Days   febuxostat 40 mg Oral Daily   folic acid 1 mg Oral Daily   fondaparinux 7.5 mg Subcutaneous Daily   gabapentin 300 mg Oral Q8H   insulin lispro 0-7 Units Subcutaneous 4x Daily With Meals & Nightly   lacosamide 100 mg Oral BID   lactulose 20 g Oral TID   levETIRAcetam 500 mg Oral Q12H   mupirocin  Topical Q12H   OLANZapine 5 mg Oral Nightly   predniSONE 40 mg Oral Daily With Breakfast   rifAXIMin 550 mg Oral Q12H   sertraline 50 mg Oral Daily   sodium bicarbonate 1,300 mg Oral TID   sodium chloride 10 mL Intravenous Q12H   vitamin B-12 250 mcg Oral Daily          OBJECTIVE    Vital Sign Min/Max for last 24 hours  Temp  Min: 97.4 °F (36.3 °C)  Max: 98.3 °F (36.8 °C)   BP  Min: 140/98  Max: 170/104   Pulse  Min: 80  Max: 92   Resp  Min: 20  Max: 23   SpO2  Min: 90 %  Max: 100 %   No data recorded   Weight  Min: 90.8 kg (200 lb 2.8 oz)  Max: 90.8 kg (200 lb 2.8 oz)     Flowsheet Rows      First Filed Value   Admission Height  185.4 cm (73\") Documented at 2019 1422   Admission Weight  84.5 kg (186 lb 4.6 oz) Documented at 2019 1422          I/O " this shift:  In: -   Out: 100 [Urine:100]  I/O last 3 completed shifts:  In: 720 [P.O.:720]  Out: 2350 [Urine:2350]    Physical Exam:  General Appearance: LETHARGIC BUT OPENS EYES TO PAIN. ON BIPAP  Head: normocephalic, without obvious abnormality and atraumatic  Eyes: conjunctivae and sclerae normal and no icterus  Neck: supple. +MILD JVD  Lungs: DECREASED BS BIBASILAR WITH FEW SCATTERED RHONCHI WITH FINE L CRACKLES  Heart: regular rhythm & normal rate and normal S1, S2 +DEDE  Chest: Wall no abnormalities observed  Abdomen: normal bowel sounds and soft non-tender +MILD ASCITES  Extremities: moves extremities well, no edema, no cyanosis and no redness  Skin: +LE WOUND  Neurologic: alert, NFD    Labs:    WBC WBC   Date Value Ref Range Status   12/12/2019 5.40 3.40 - 10.80 10*3/mm3 Final   12/11/2019 5.10 3.40 - 10.80 10*3/mm3 Final   12/10/2019 5.10 3.40 - 10.80 10*3/mm3 Final      HGB Hemoglobin   Date Value Ref Range Status   12/12/2019 8.0 (L) 13.0 - 17.7 g/dL Final   12/11/2019 8.2 (L) 13.0 - 17.7 g/dL Final   12/10/2019 10.6 (L) 12.0 - 17.0 g/dL Final   12/10/2019 8.2 (L) 13.0 - 17.7 g/dL Final      HCT Hematocrit   Date Value Ref Range Status   12/12/2019 25.2 (L) 37.5 - 51.0 % Final   12/11/2019 25.8 (L) 37.5 - 51.0 % Final   12/10/2019 31 (L) 38 - 51 % Final   12/10/2019 26.2 (L) 37.5 - 51.0 % Final      Platlets No results found for: LABPLAT   MCV MCV   Date Value Ref Range Status   12/12/2019 92.7 79.0 - 97.0 fL Final   12/11/2019 92.4 79.0 - 97.0 fL Final   12/10/2019 92.7 79.0 - 97.0 fL Final          Sodium Sodium   Date Value Ref Range Status   12/12/2019 143 136 - 145 mmol/L Final   12/11/2019 144 136 - 145 mmol/L Final   12/10/2019 142 136 - 145 mmol/L Final      Potassium Potassium   Date Value Ref Range Status   12/12/2019 4.5 3.5 - 5.2 mmol/L Final   12/11/2019 4.5 3.5 - 5.2 mmol/L Final   12/11/2019 4.5 3.5 - 5.2 mmol/L Final   12/10/2019 4.4 3.5 - 5.2 mmol/L Final      Chloride Chloride   Date  Value Ref Range Status   12/12/2019 105 98 - 107 mmol/L Final   12/11/2019 105 98 - 107 mmol/L Final   12/10/2019 103 98 - 107 mmol/L Final      CO2 CO2   Date Value Ref Range Status   12/12/2019 29.0 22.0 - 29.0 mmol/L Final   12/11/2019 27.0 22.0 - 29.0 mmol/L Final   12/10/2019 26.0 22.0 - 29.0 mmol/L Final      BUN BUN   Date Value Ref Range Status   12/12/2019 85 (H) 6 - 20 mg/dL Final   12/11/2019 96 (H) 6 - 20 mg/dL Final   12/10/2019 93 (H) 6 - 20 mg/dL Final      Creatinine Creatinine   Date Value Ref Range Status   12/12/2019 2.21 (H) 0.76 - 1.27 mg/dL Final   12/11/2019 2.57 (H) 0.76 - 1.27 mg/dL Final   12/10/2019 2.87 (H) 0.76 - 1.27 mg/dL Final      Calcium Calcium   Date Value Ref Range Status   12/12/2019 8.8 8.6 - 10.5 mg/dL Final   12/11/2019 9.1 8.6 - 10.5 mg/dL Final   12/10/2019 8.7 8.6 - 10.5 mg/dL Final      PO4 No components found for: PO4   Albumin Albumin   Date Value Ref Range Status   12/12/2019 3.70 3.50 - 5.20 g/dL Final   12/11/2019 3.60 3.50 - 5.20 g/dL Final   12/10/2019 3.70 3.50 - 5.20 g/dL Final      Magnesium Magnesium   Date Value Ref Range Status   12/12/2019 2.5 1.6 - 2.6 mg/dL Final   12/11/2019 2.7 (H) 1.6 - 2.6 mg/dL Final   12/10/2019 2.7 (H) 1.6 - 2.6 mg/dL Final      Uric Acid No components found for: URIC ACID     Imaging Results (Last 72 Hours)     Procedure Component Value Units Date/Time    CT Head Without Contrast [[id number]] Collected:  12/11/19 1309     Updated:  12/11/19 1317    Narrative:          DATE OF EXAM:  12/11/2019 12:00 PM     PROCEDURE:   CT HEAD WO CONTRAST-     INDICATIONS:   Confusion/delirium, altered level of consciousness.     COMPARISON:  8/27/2019.     TECHNIQUE:   Routine transaxial cuts were obtained through the head without the  administration of contrast. Automated exposure control and iterative  reconstruction methods were used.      FINDINGS:  There are areas of decreased density seen in the white matter tracts  similar to the previous CT  study. This may be due to chronic  microvascular ischemia, although somewhat unusual in a patient this age.  There are small old lacunar infarcts seen within the right caudate head  nucleus and left lentiform nucleus. The cerebral sulci, fissures,  ventricles, and basal cisterns appear normal. There is no acute  hemorrhage, midline shift, or suspicious extra-axial-axial fluid  collections. The orbital contents are normal. The mastoid sinuses are  clear. There is mild mucosal thickening in the maxillary and ethmoid  sinuses indicating chronic sinus disease.        Impression:       No interval change from the previous study with abnormalities as  described above.     Electronically Signed By-Bar James On:12/11/2019 1:15 PM  This report was finalized on [id number] by  Bar James, .     Liver [[id number]] Collected:  12/11/19 0027     Updated:  12/11/19 0229    Narrative:       Exam: Liver ultrasound    INDICATION: Cirrhosis.  Check for cancer    FINDINGS:  Liver is inhomogeneous consistent with cirrhosis.  There is moderate ascites.  However, no mass lesions are noted.  Portal vein is patent.    Call bladder wall is thickened measuring 10 mm.  No obvious gallstones.  Common bile duct not visualized.      Impression:       1.  Cirrhosis with moderate ascites.  2.  There is a thickened gallbladder wall and gallstones.  This can be a sign of cholecystitis.  If there are signs or symptoms of cholecystitis consider HIDA scan to check gallbladder function.    Electronically signed by:  Serafin Carrillo M.D.    12/11/2019 12:28 AM    XR Chest 1 View [[id number]] Collected:  12/10/19 1344     Updated:  12/10/19 1349    Narrative:       DATE OF EXAM:  12/10/2019 1:17 PM     PROCEDURE:  XR CHEST 1 VW-     INDICATIONS:  hypoxia; N17.9-Acute kidney failure, unspecified; N18.9-Chronic kidney  disease, unspecified; D64.9-Anemia, unspecified     COMPARISON:  Chest radiographs 12/6/2019 and 8/21/2019. CT abdomen pelvis  3/10/2019.  CT chest 5/19/2018.     TECHNIQUE:   Single radiographic AP view of the chest was obtained.     FINDINGS:  The study is limited by lordotic patient positioning. Overlying  artifacts. Stable sternotomy wires and postoperative changes in the  mediastinum. Low lung volumes with elevation of the right hemidiaphragm.  Stable patchy hazy opacities in both lungs. No pneumothorax. Unchanged  cardiomediastinal contours. No acute osseous abnormality is identified.        Impression:       Limited study demonstrating stable patchy hazy opacities in both lungs,  which could represent edema, atypical pneumonia, or chronic lung  disease.     Electronically Signed By-Allan Salomon On:12/10/2019 1:47 PM  This report was finalized on [id number] by  Allan Salomon .          Results for orders placed during the hospital encounter of 12/06/19   XR Chest 1 View    Narrative DATE OF EXAM:  12/10/2019 1:17 PM     PROCEDURE:  XR CHEST 1 VW-     INDICATIONS:  hypoxia; N17.9-Acute kidney failure, unspecified; N18.9-Chronic kidney  disease, unspecified; D64.9-Anemia, unspecified     COMPARISON:  Chest radiographs 12/6/2019 and 8/21/2019. CT abdomen pelvis 3/10/2019.  CT chest 5/19/2018.     TECHNIQUE:   Single radiographic AP view of the chest was obtained.     FINDINGS:  The study is limited by lordotic patient positioning. Overlying  artifacts. Stable sternotomy wires and postoperative changes in the  mediastinum. Low lung volumes with elevation of the right hemidiaphragm.  Stable patchy hazy opacities in both lungs. No pneumothorax. Unchanged  cardiomediastinal contours. No acute osseous abnormality is identified.        Impression Limited study demonstrating stable patchy hazy opacities in both lungs,  which could represent edema, atypical pneumonia, or chronic lung  disease.     Electronically Signed By-Allan Salomon On:12/10/2019 1:47 PM  This report was finalized on [id number] by  Brandy Levi   XR Chest 1 View     Narrative XR CHEST 1 VW-     Date of Exam: 12/6/2019 6:55 PM     Indication: sob; N17.9-Acute kidney failure, unspecified; N18.9-Chronic  kidney disease, unspecified; D64.9-Anemia, unspecified  39-year-old  history of lupus, on home oxygen, anemia     Comparison: 08/21/2019, 03/29/2019     Technique: 1 view(s) of the chest were obtained.     FINDINGS: There is still volume loss in the right chest which is  likely postsurgical. There are also sternotomy wires and postsurgical  changes in the mediastinum. Alveolar and interstitial opacities  bilaterally are present similar in appearance and distribution to  8/21/2019 and 3/29/2019. No pneumothorax or definite pleural effusion.  There is calcification along the right diaphragm which is unchanged.  Trachea is midline. No definite pleural effusions.       Impression There are postsurgical changes in the right chest as well as sternotomy  wires and surgical clips in the mediastinum. No significant change in  alveolar and airspace opacities bilaterally which may be chronic or due  to recurrent infection.        Electronically Signed By-Yogesh Salmeron DO. On:12/6/2019 8:18 PM  This report was finalized on [id number] by  Yogesh Salmeron DO..              ASSESSMENT / PLAN      Acute renal failure superimposed on chronic kidney disease (CMS/HCC)    CAPS (catastrophic antiphospholipid syndrome) (CMS/HCC)    Benign essential hypertension    Substance abuse in remission (CMS/HCC)    Deep vein thrombosis (DVT) of lower extremity (CMS/HCC)    Hepatic cirrhosis (CMS/HCC)    PVD (peripheral vascular disease) (CMS/HCC)    SLE (systemic lupus erythematosus) (CMS/HCC)    Venous stasis ulcer of left calf limited to breakdown of skin without varicose veins (CMS/HCC)    Other ascites    Acute on chronic respiratory failure with hypoxia and hypercapnia (CMS/HCC)    Anemia, chronic disease    Hyperkalemia    Secondary hyperparathyroidism (CMS/HCC)    Delirium, drug-induced  (CMS/[facility])      1. TABBY/CKD STG 3----TABBY on top of known CRF/CKD stage 3, with a baseline serum creatinine of around 1.6-1.7. CRF/CKD STG 3 is secondary to HTN NS/SLE/history of biopsy proven MERYL which may be recurrent. Renal US ok. +ARF/TABBY appears to be secondary to prerenal state and occasional NSAID use and likely recurrent MERYL. No obstructive uropathy on US. Off IVFs. No NSAIDs. No IV dye unless urgently needed.  Dose medsfor CrCL<10 cc/min. Follow for dialysis need. No obvious uremia at present. Patient refuses renal biopsy and would be high risk given thrombocytopenia anyway     2. PERSISTENT PROTEINURIA---Quantify. 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  Patient had Refused biopsy and not likely to be beneficial at present.     3. HISTORY OF LUPUS NEPHRITIS---S/P 3 days of pulse steroids. Change to po Pred     4. HISTORY OF MINIMAL CHANGE DISEASE--- quantify proteinuria.  Steroids for likely relapse     5. HTN WITH CKD--- BP ok. No ACE/ARB/DRI     6. HYPOALBUMINEMIA--- secondary to proteinuria and poor PO intake. S/P IV Albumin to temporize     7. ANEMIA OF CKD--- Venofer for JUNG. Follow for PRBC need. EPO started     8. HYPONATREMIA-- Resolved. Follow with little diuretic exposure this AM     9. HYPOCALCEMIA--- Replaced IV.      10. HYPERKALEMIA--- Resolved. Medically treated with Rx with Kayexalate, bicarb, IV Humulin R (without D50 b/c sugars high), and CaGluconate. On K+ restriction in diet. On telemetry. Follow levels closely     11. CUMMINS---- Follow ascites for paracentesis need     12. MEDICAL NONCOMPLIANCE      13. PANCYTOPENIA-------?recurrent autoimmune thrombocytopenia. Last hospitalization, patient required plasmapheresis. , Hem/Onc to see    14. ACIDOSIS-------Metabolic. No elevation in AGAP. +Type 4 RTA. Continue po NaHCO3    15. RF ASSOCIATED HYPERPHOSPHATEMIA------Better. D/C Phoslo    16. SECONDARY HYPERPARATHYROIDISM    17. ANTIPHOSPHOLIPID SYNDROME/HISTORY OF PE---status post IVC  filter.  Currently on Arixtra.    18. DELERIUM-------Better    Non oliguric, creatinine better  Wants to go home, if discharged will need BMP in 2-3 days, and follow up in 2 weeks    Kennedy Rodas MD  [facility]  [phone]  12/12/19  6:33 AM

## 2019-12-13 VITALS
TEMPERATURE: 98.1 F | BODY MASS INDEX: 26.59 KG/M2 | DIASTOLIC BLOOD PRESSURE: 91 MMHG | SYSTOLIC BLOOD PRESSURE: 155 MMHG | WEIGHT: 200.62 LBS | OXYGEN SATURATION: 98 % | HEART RATE: 86 BPM | HEIGHT: 73 IN | RESPIRATION RATE: 17 BRPM

## 2019-12-13 LAB
ALBUMIN SERPL-MCNC: 3.3 G/DL (ref 3.5–5.2)
ANION GAP SERPL CALCULATED.3IONS-SCNC: 8 MMOL/L (ref 5–15)
BACTERIA SPEC AEROBE CULT: NORMAL
BACTERIA SPEC AEROBE CULT: NORMAL
BUN BLD-MCNC: 72 MG/DL (ref 6–20)
BUN/CREAT SERPL: 38.3 (ref 7–25)
CALCIUM SPEC-SCNC: 8.4 MG/DL (ref 8.6–10.5)
CHLORIDE SERPL-SCNC: 106 MMOL/L (ref 98–107)
CO2 SERPL-SCNC: 30 MMOL/L (ref 22–29)
CREAT BLD-MCNC: 1.88 MG/DL (ref 0.76–1.27)
GFR SERPL CREATININE-BSD FRML MDRD: 41 ML/MIN/1.73
GLUCOSE BLD-MCNC: 100 MG/DL (ref 65–99)
GLUCOSE BLDC GLUCOMTR-MCNC: 125 MG/DL (ref 70–105)
GLUCOSE BLDC GLUCOMTR-MCNC: 134 MG/DL (ref 70–105)
PHOSPHATE SERPL-MCNC: 2 MG/DL (ref 2.5–4.5)
POTASSIUM BLD-SCNC: 4.1 MMOL/L (ref 3.5–5.2)
SODIUM BLD-SCNC: 144 MMOL/L (ref 136–145)

## 2019-12-13 PROCEDURE — 82962 GLUCOSE BLOOD TEST: CPT

## 2019-12-13 PROCEDURE — 63710000001 PREDNISONE PER 1 MG: Performed by: INTERNAL MEDICINE

## 2019-12-13 PROCEDURE — 99239 HOSP IP/OBS DSCHRG MGMT >30: CPT | Performed by: INTERNAL MEDICINE

## 2019-12-13 PROCEDURE — 80069 RENAL FUNCTION PANEL: CPT | Performed by: INTERNAL MEDICINE

## 2019-12-13 PROCEDURE — 99233 SBSQ HOSP IP/OBS HIGH 50: CPT | Performed by: INTERNAL MEDICINE

## 2019-12-13 RX ORDER — CARVEDILOL 6.25 MG/1
12.5 TABLET ORAL 2 TIMES DAILY WITH MEALS
Status: DISCONTINUED | OUTPATIENT
Start: 2019-12-13 | End: 2019-12-13 | Stop reason: HOSPADM

## 2019-12-13 RX ORDER — DOXYCYCLINE 100 MG/1
100 TABLET ORAL EVERY 12 HOURS SCHEDULED
Qty: 6 TABLET | Refills: 0 | Status: SHIPPED | OUTPATIENT
Start: 2019-12-13 | End: 2019-12-16

## 2019-12-13 RX ORDER — GABAPENTIN 800 MG/1
400 TABLET ORAL 3 TIMES DAILY
Qty: 10 TABLET | Refills: 0
Start: 2019-12-13

## 2019-12-13 RX ORDER — BUMETANIDE 1 MG/1
1 TABLET ORAL 2 TIMES DAILY
Qty: 30 TABLET | Refills: 0 | Status: SHIPPED | OUTPATIENT
Start: 2019-12-13

## 2019-12-13 RX ORDER — LACTULOSE 10 G/15ML
20 SOLUTION ORAL DAILY
Qty: 1000 ML | Refills: 0 | Status: SHIPPED | OUTPATIENT
Start: 2019-12-13

## 2019-12-13 RX ORDER — FUROSEMIDE 40 MG/1
40 TABLET ORAL DAILY
Status: DISCONTINUED | OUTPATIENT
Start: 2019-12-13 | End: 2019-12-13 | Stop reason: HOSPADM

## 2019-12-13 RX ORDER — OMEPRAZOLE 20 MG/1
20 TABLET, DELAYED RELEASE ORAL DAILY
Qty: 30 TABLET | Refills: 0 | Status: SHIPPED | OUTPATIENT
Start: 2019-12-13

## 2019-12-13 RX ORDER — PREDNISONE 20 MG/1
40 TABLET ORAL
Qty: 30 TABLET | Refills: 0 | Status: SHIPPED | OUTPATIENT
Start: 2019-12-14

## 2019-12-13 RX ADMIN — FEBUXOSTAT 40 MG: 40 TABLET ORAL at 09:01

## 2019-12-13 RX ADMIN — FOLIC ACID 1 MG: 1 TABLET ORAL at 09:00

## 2019-12-13 RX ADMIN — Medication 10 ML: at 09:01

## 2019-12-13 RX ADMIN — LEVETIRACETAM 500 MG: 500 TABLET ORAL at 09:00

## 2019-12-13 RX ADMIN — PANTOPRAZOLE SODIUM 40 MG: 40 TABLET, DELAYED RELEASE ORAL at 06:27

## 2019-12-13 RX ADMIN — LACTULOSE 20 G: 10 SOLUTION ORAL at 09:01

## 2019-12-13 RX ADMIN — LACOSAMIDE 100 MG: 100 TABLET, FILM COATED ORAL at 09:01

## 2019-12-13 RX ADMIN — GABAPENTIN 300 MG: 300 CAPSULE ORAL at 06:27

## 2019-12-13 RX ADMIN — BUPRENORPHINE HYDROCHLORIDE 20 MG: 8 TABLET SUBLINGUAL at 09:01

## 2019-12-13 RX ADMIN — RIFAXIMIN 550 MG: 550 TABLET ORAL at 09:00

## 2019-12-13 RX ADMIN — PREDNISONE 40 MG: 20 TABLET ORAL at 09:00

## 2019-12-13 RX ADMIN — CARVEDILOL 12.5 MG: 6.25 TABLET, FILM COATED ORAL at 09:00

## 2019-12-13 RX ADMIN — MUPIROCIN: 20 OINTMENT TOPICAL at 09:01

## 2019-12-13 RX ADMIN — DOXYCYCLINE 100 MG: 100 TABLET, FILM COATED ORAL at 06:27

## 2019-12-13 RX ADMIN — SERTRALINE HYDROCHLORIDE 50 MG: 50 TABLET ORAL at 09:00

## 2019-12-13 RX ADMIN — CLONAZEPAM 0.5 MG: 0.5 TABLET ORAL at 09:00

## 2019-12-13 RX ADMIN — OYSTER SHELL CALCIUM WITH VITAMIN D 1 TABLET: 500; 200 TABLET, FILM COATED ORAL at 09:00

## 2019-12-13 RX ADMIN — CYANOCOBALAMIN TAB 250 MCG 250 MCG: 250 TAB at 09:01

## 2019-12-13 NOTE — PLAN OF CARE
Problem: Anemia (Adult)  Goal: Identify Related Risk Factors and Signs and Symptoms  Outcome: Ongoing (interventions implemented as appropriate)     Problem: Patient Care Overview  Goal: Plan of Care Review  Outcome: Ongoing (interventions implemented as appropriate)     Problem: Pain, Chronic (Adult)  Goal: Identify Related Risk Factors and Signs and Symptoms  Outcome: Ongoing (interventions implemented as appropriate)     Problem: Fall Risk (Adult)  Goal: Identify Related Risk Factors and Signs and Symptoms  Outcome: Ongoing (interventions implemented as appropriate)     Problem: Restraint, Behavioral (Acute Care)  Goal: Behavioral Restraint: Absence of Injury/Harm  Outcome: Ongoing (interventions implemented as appropriate)   Patient currently out of restraints and has been without a sitter since 12/12 2300. Patient is polite at times, but also has periods of aggression and paranoia. Patient verbally abusive to nursing staff. Security was called this shift, and patient deescalated fairly quick. Left leg wound is looking better, dressing changed tonight.

## 2019-12-13 NOTE — PLAN OF CARE
Problem: Anemia (Adult)  Goal: Identify Related Risk Factors and Signs and Symptoms  Outcome: Ongoing (interventions implemented as appropriate)  Flowsheets  Taken 12/7/2019 0648 by Sveta Carrero RN  Related Risk Factors (Anemia): chronic illness;poor nutrition  Taken 12/9/2019 0428 by Naeem Sharp LPN  Signs and Symptoms (Anemia): fatigue  Goal: Symptom Improvement  Outcome: Ongoing (interventions implemented as appropriate)  Flowsheets (Taken 12/8/2019 1251 by Ysabel Sherwood RN)  Symptom Improvement: making progress toward outcome     Problem: Patient Care Overview  Goal: Plan of Care Review  Outcome: Ongoing (interventions implemented as appropriate)  Flowsheets  Taken 12/10/2019 0337 by Naeem Sharp LPN  Progress: improving  Taken 12/13/2019 0800 by Lia Martinez RN  Plan of Care Reviewed With: patient  Taken 12/13/2019 1421 by Lia Martinez RN  Outcome Summary: patient discharging home today  Goal: Individualization and Mutuality  Outcome: Ongoing (interventions implemented as appropriate)  Goal: Discharge Needs Assessment  Outcome: Ongoing (interventions implemented as appropriate)  Flowsheets (Taken 12/9/2019 1546 by Herb Huitron RN)  Equipment Needed After Discharge: none  Discharge Coordination/Progress: Return home with parents - possible outpt hemodialisys need - will follow.  Equipment Currently Used at Home: oxygen;walker, rolling;wheelchair ([employer] 3-4 L pnc)  Anticipated Changes Related to Illness: none  Concerns Comments: ARF - possible HD at DC.  Transportation Anticipated: family or friend will provide  Readmission Within the Last 30 Days: no previous admission in last 30 days  Patient/Family Anticipated Services at Transition: none  Patient/Family Anticipates Transition to: home with family  Goal: Interprofessional Rounds/Family Conf  Outcome: Ongoing (interventions implemented as appropriate)  Flowsheets (Taken 12/12/2019 2505)  Participants: case  manager;nursing;patient;pharmacy;social work/services     Problem: Pain, Chronic (Adult)  Goal: Identify Related Risk Factors and Signs and Symptoms  Outcome: Ongoing (interventions implemented as appropriate)  Flowsheets (Taken 12/8/2019 1251 by Ysabel Sherwood RN)  Related Risk Factors (Chronic Pain): disease process  Signs and Symptoms (Chronic Pain): alteration in muscle tone  Goal: Acceptable Pain/Comfort Level and Functional Ability  Outcome: Ongoing (interventions implemented as appropriate)  Flowsheets (Taken 12/8/2019 1251 by Ysabel Sherwood RN)  Acceptable Pain/Comfort Level and Functional Ability: making progress toward outcome     Problem: Fall Risk (Adult)  Goal: Identify Related Risk Factors and Signs and Symptoms  Outcome: Ongoing (interventions implemented as appropriate)  Flowsheets (Taken 12/8/2019 0424 by Hollie Thorne RN)  Related Risk Factors (Fall Risk): bladder function altered;culprit medication(s);depression/anxiety;confusion/agitation;fatigue/slow reaction;neuro disease/injury;environment unfamiliar  Signs and Symptoms (Fall Risk): presence of risk factors     Problem: Restraint, Behavioral (Acute Care)  Goal: Behavioral Restraint: Absence of Injury/Harm  Outcome: Ongoing (interventions implemented as appropriate)  Goal: Behavioral Restraint: Discontinuation Criteria Achieved  Outcome: Ongoing (interventions implemented as appropriate)  Goal: Behavioral Restraint: Preservation of Dignity/Wellbeing  Outcome: Ongoing (interventions implemented as appropriate)

## 2019-12-13 NOTE — PROGRESS NOTES
Hematology/Oncology Inpatient Progress Note    PATIENT NAME: Lorenzo Crockett  : 1980  MRN: [id number]    CHIEF COMPLAINT: Pancytopenia    HISTORY OF PRESENT ILLNESS:    39 y.o. male admitted through the ED 2019 after he was instructed to go by the cancer center with abnormal elevation of potassium (5.5) on outpatient labs 2019.  The patient had been instructed to go to the ED 2019.  He reported worsening abdominal distention.  Repeat potassium level was normal (5.0) but creatinine level was rising (3.92) from baseline.  Nephrology was consulted and the patient has known CKD secondary to SLE/MERYL.  LFTs were okay.  CBC showed WBC 6.6, hemoglobin 8.6, MCV 90.7, and platelets 118,000.  Chest x-ray showed no change in alveolar and airspace opacities bilaterally as compared with 2019.  Renal ultrasound was negative for hydronephrosis and showed markedly echogenic kidneys with mild cortical thickening and moderate ascites. On 19 he was started on daily Solu-Medrol. On 2019 CBC showed WBC 3.2, hemoglobin 8.4, and platelets 106,000.      19  Hematology/Oncology was consulted as the patient is known to our service for thrombocytopenia and thrombophilia.  He was initially evaluated by Dr. Mak in .  He was anemic at that time and diagnosed with JUNG.  Thrombocytopenia was felt due to medication (risperidone) and concern for bone marrow suppression secondary to lupus treatment.  He was also noted at that time to have bulky adenopathy on CT scans with PET ordered but not completed due to patient noncompliance.  He was lost to follow-up and seen again in 2017 for thrombocytopenia during hospital admission.  Thrombocytopenia stabilized and no etiology was detected.  He had a history of positive lupus anticoagulant and was continued on Arixtra and had undergone an IVC filter placement at some point.  Anemia again was felt due to JUNG and ACD.  He was lost to follow-up  again until consulted during an additional hospitalization in 2019.  He had been evaluated by rheumatology and diagnosed with catastrophic antiphospholipid syndrome treated with plasma exchange x5 and high-dose steroids along with a brief period of dialysis.  On 3/11/2019 hematologic parameters including haptoglobin, normal at 64.  .  Ferritin 32.  Complement C3, C4 was low.  Serum iron was low at 27.  .  Viral hepatitis panel was nonreactive.  B12 was more than 1,500.  Folate was more than 24.  He received IV IgG 40 g on 4/4, 4/5, and 4/8/2019.  He received Injectafer x2 doses with last given 4/15/2019.   He was last seen as an outpatient on 12/4/2019 where CBC revealed WBC 6.49, hemoglobin 8.4, and platelets 89,000.  He complained of chest pain and oxygen saturation falling into the 60s when off home oxygen and was encouraged to go to the ED.  For his thrombophilia he was noted to have had an IVC filter placed with most recent anticoagulation of Arixtra 7.5 mg subcu daily. Thrombocytopenia was felt likely secondary to ITP and underlying autoimmune disease with baseline platelet count 100,000. For his multifactorial anemia including JUNG he was encouraged to see GI and plans were to continue as needed Injectafer with the addition of Procrit if needed secondary to CKD.  Vitamin B12 deficiency was noted in August 2019.  He was on oral replacement and this was changed to injectable.  On 12/4/2019 iron studies showed iron 33 (), and iron saturation 13% (20-50), TIBC 252 (298-536), and ferritin 279 ().     PCP: Lori Hale NP    Subjective Complains about pain mostly in the back, but feels better overall. Wants to go home    ROS:  Review of Systems   Constitutional: Negative for chills and fever.   HENT: Negative for ear pain, mouth sores, nosebleeds and sore throat.    Eyes: Negative for photophobia and visual disturbance.   Respiratory: Negative for wheezing and stridor.    Cardiovascular:  Negative for chest pain and palpitations.   Gastrointestinal: Negative for abdominal pain, diarrhea, nausea and vomiting.   Endocrine: Negative for cold intolerance and heat intolerance.   Genitourinary: Negative for dysuria and hematuria.   Musculoskeletal: Negative for joint swelling and neck stiffness.   Skin: Negative for color change and rash.   Neurological: Negative for seizures and syncope.   Hematological: Negative for adenopathy.        No obvious bleeding   Psychiatric/Behavioral: Negative for agitation, confusion and hallucinations.        MEDICATIONS:    Scheduled Meds:      buprenorphine 20 mg Sublingual Daily   calcium-vitamin D 1 tablet Oral BID   carvedilol 12.5 mg Oral BID With Meals   clonazePAM 0.5 mg Oral BID   doxycycline 100 mg Oral Q12H   epoetin candy/candy-epbx 20,000 Units Subcutaneous Q14 Days   febuxostat 40 mg Oral Daily   folic acid 1 mg Oral Daily   fondaparinux 7.5 mg Subcutaneous Daily   furosemide 40 mg Oral Daily   gabapentin 300 mg Oral Q8H   insulin lispro 0-7 Units Subcutaneous 4x Daily With Meals & Nightly   lacosamide 100 mg Oral BID   lactulose 20 g Oral TID   levETIRAcetam 500 mg Oral Q12H   mupirocin  Topical Q12H   pantoprazole 40 mg Oral Q AM   predniSONE 40 mg Oral Daily With Breakfast   rifAXIMin 550 mg Oral Q12H   sertraline 50 mg Oral Daily   sodium chloride 10 mL Intravenous Q12H   vitamin B-12 250 mcg Oral Daily      Continuous Infusions:        PRN Meds:  •  bisacodyl  •  bisacodyl  •  calcium carbonate  •  dextrose  •  dextrose  •  famotidine  •  glucagon (human recombinant)  •  insulin lispro **AND** insulin lispro  •  melatonin  •  nitroglycerin  •  OLANZapine  •  ondansetron **OR** ondansetron  •  prochlorperazine  •  rOPINIRole  •  [COMPLETED] Insert peripheral IV **AND** sodium chloride  •  sodium chloride     ALLERGIES:    Allergies   Allergen Reactions   • Tramadol Other (See Comments)     seizure   • Melon Rash       Objective    VITALS:   /91    "Pulse 86   Temp 98.1 °F (36.7 °C)   Resp 17   Ht 185.4 cm (73\")   Wt 91 kg (200 lb 9.9 oz)   SpO2 98%   BMI 26.47 kg/m²     PHYSICAL EXAM:  Physical Exam   Constitutional: He is oriented to person, place, and time. No distress.   Frail   HENT:   Head: Normocephalic and atraumatic.   Eyes: Conjunctivae and EOM are normal. Right eye exhibits no discharge. Left eye exhibits no discharge. No scleral icterus.   Neck: Normal range of motion. Neck supple. No thyromegaly present.   Cardiovascular: Normal rate, regular rhythm and normal heart sounds. Exam reveals no gallop and no friction rub.   Pulmonary/Chest: Effort normal. No stridor. No respiratory distress. He has no wheezes.   Diminished breath sounds bilaterally   Abdominal: Soft. Bowel sounds are normal. He exhibits no mass. There is no tenderness. There is no rebound and no guarding.   Musculoskeletal: Normal range of motion. He exhibits deformity ( History of LLE partial amputation). He exhibits no tenderness.   Lymphadenopathy:     He has no cervical adenopathy.   Neurological: He is alert and oriented to person, place, and time. He exhibits normal muscle tone.   Skin: Skin is warm. No rash noted. He is not diaphoretic. No erythema.   Psychiatric: He has a normal mood and affect. His behavior is normal.   Nursing note and vitals reviewed.        RECENT LABS:  Lab Results (last 24 hours)     Procedure Component Value Units Date/Time    POC Glucose Once [[id number]]  (Abnormal) Collected:  12/13/19 1121    Specimen:  Blood Updated:  12/13/19 1126     Glucose 134 mg/dL      Comment: Serial Number: [id number]:  [id number]       Blood Culture - Blood, Arm, Left [[id number]] Collected:  12/08/19 1004    Specimen:  Blood from Arm, Left Updated:  12/13/19 1028     Blood Culture No growth at 5 days    Blood Culture - Blood, Wrist, Right [[id number]] Collected:  12/08/19 1005    Specimen:  Blood from Wrist, Right Updated:  12/13/19 1028     Blood Culture No growth " at 5 days    POC Glucose Once [[id number]]  (Abnormal) Collected:  12/13/19 0807    Specimen:  Blood Updated:  12/13/19 0809     Glucose 125 mg/dL      Comment: Serial Number: [id number]:  [id number]       Renal Function Panel [[id number]]  (Abnormal) Collected:  12/13/19 0441    Specimen:  Blood Updated:  12/13/19 0551     Glucose 100 mg/dL      BUN 72 mg/dL      Creatinine 1.88 mg/dL      Sodium 144 mmol/L      Potassium 4.1 mmol/L      Chloride 106 mmol/L      CO2 30.0 mmol/L      Calcium 8.4 mg/dL      Albumin 3.30 g/dL      Phosphorus 2.0 mg/dL      Anion Gap 8.0 mmol/L      BUN/Creatinine Ratio 38.3     eGFR Non African Amer 41 mL/min/1.73     Narrative:       GFR Normal >60  Chronic Kidney Disease <60  Kidney Failure <15      POC Glucose Once [[id number]]  (Abnormal) Collected:  12/12/19 1926    Specimen:  Blood Updated:  12/12/19 1927     Glucose 197 mg/dL      Comment: Serial Number: [id number]:  [id number]       POC Glucose Once [[id number]]  (Abnormal) Collected:  12/12/19 1640    Specimen:  Blood Updated:  12/12/19 1644     Glucose 136 mg/dL      Comment: Serial Number: [id number]:  [id number]             PENDING RESULTS: n/a    IMAGING REVIEWED:  No radiology results for the last day    I have reviewed the remained stable at 94,000 labs, imaging, reports, and other clinician documentation.    Assessment/Plan   ASSESSMENT:  1. Pancytopenia/chronic thrombocytopenia (possible ITP)/JUNG and ACD secondary to CKD/vitamin B12 deficiency- s/p Venofer, folic acid and on Procrit as inpatient.  On oral B12- injection x1 ordered.  WBC with mild drop post admission possibly related to antibiotics.  Platelets fluctuating some.  With ITP could use IV IgG however this is not available due to backorder and Nplate could be considered but there is concern for clotting with this medication given patient's history.  Stable.  2. Thrombophilia/antiphospholipid syndrome/history of PE- s/p IVC filter. On Arixtra.     3. TABBY on CKD stage III/proteinuria/electrolyte imbalances/Lupus nephritis-Dr. Goodwin managing with TABBY felt secondary to prerenal state and occasional NSAID use.  Concern for recurrent minimal-change disease. On prednisone daily for lupus nephritis.  Patient declining renal biopsy.  4. History of minimal-change disease/SLE- chronic SLE.  MCV treated in past with plasma exchange and high-dose steroids.   5. LLE wound - MRSA positive.  Per ID.  6. CUMMINS/ascites- last paracentesis August 2019.  Albumin low.  LFTs ok.  GI consulted by primary team.  Ammonia elevated.  Hep panel neg. INR 1.36-stable.   7. Hypoxia/chronic respiratory failure-oxygen dependent. S/P Zosyn, On doxycycline. Per PMD.   8. Confusion/encephalopathy - CT head neg. Ammonia level elevated-on lactulose.  Primary team note states patient had been taking his own pain medication from home, taken away, now suspicious for withdrawal.  9. H/O drug addiction-per PMD. On Subutex.     PLAN:  1. IV IgG unavailable due to backorder and hesitant to use N-plate with risk of clotting given antiphospholipid syndrome.   2. Rheumatology does not provide inpatient consults.  3. Continue prednisone per renal.   4. Continue Arixtra and Procrit/Retacrit.  5. Daily CBC.  6. Continue Vitamin B12 1000 mcg IM monthly as outpatient.  7. Continue Protonix.           Note updated by LESLYE Alberts.  Patient seen and examined by Dr. Mak.  Electronically signed by LESLYE Shell, 12/13/19, 4:16 PM.    I have personally performed a face-to-face diagnostic evaluation on this patient.  I have reviewed and agree with the care plan.  Notes reviewed and edited.  Discussed with nurse practitioner.  Platelets remains are lower today. Continue steroids. Follow heme parameters. IVIG not available.  He feels better overall. Follow up in the office if discharged over the weekend early next week.  We will continue to follow.  I discussed the patients findings and my  recommendations with patient    Oma Mak MD  12/13/19  4:15 PM

## 2019-12-13 NOTE — PROGRESS NOTES
"NEPHROLOGY PROGRESS NOTE------[facility]    [facility]  [phone]  Kennedy Rodas MD      Patient Care Team:  Lori aHle NP as PCP - General (Family Medicine)  Lori Hale NP as PCP - Claims Attributed  Lori Hale NP as PCP - Family Medicine  Oma Mak MD as Consulting Physician (Hematology and Oncology)  Pito Goodwin MD as Consulting Physician (Nephrology)      Provider:  Kennedy Rodas MD  Patient Name: Lorenzo Crockett  :  1980    SUBJECTIVE:    F/u TABBY/CKD   No SOB, CP, dysuria  Wants to go home    Medication:    buprenorphine 20 mg Sublingual Daily   calcium-vitamin D 1 tablet Oral BID   carvedilol 6.25 mg Oral BID With Meals   clonazePAM 0.5 mg Oral BID   doxycycline 100 mg Oral Q12H   epoetin candy/candy-epbx 20,000 Units Subcutaneous Q14 Days   febuxostat 40 mg Oral Daily   folic acid 1 mg Oral Daily   fondaparinux 7.5 mg Subcutaneous Daily   gabapentin 300 mg Oral Q8H   insulin lispro 0-7 Units Subcutaneous 4x Daily With Meals & Nightly   lacosamide 100 mg Oral BID   lactulose 20 g Oral TID   levETIRAcetam 500 mg Oral Q12H   mupirocin  Topical Q12H   pantoprazole 40 mg Oral Q AM   predniSONE 40 mg Oral Daily With Breakfast   rifAXIMin 550 mg Oral Q12H   sertraline 50 mg Oral Daily   sodium chloride 10 mL Intravenous Q12H   vitamin B-12 250 mcg Oral Daily          OBJECTIVE    Vital Sign Min/Max for last 24 hours  Temp  Min: 97.5 °F (36.4 °C)  Max: 99 °F (37.2 °C)   BP  Min: 148/99  Max: 176/107   Pulse  Min: 83  Max: 112   Resp  Min: 10  Max: 19   SpO2  Min: 92 %  Max: 100 %   No data recorded   Weight  Min: 91 kg (200 lb 9.9 oz)  Max: 91 kg (200 lb 9.9 oz)     Flowsheet Rows      First Filed Value   Admission Height  185.4 cm (73\") Documented at 2019 1422   Admission Weight  84.5 kg (186 lb 4.6 oz) Documented at 2019 1422          I/O this shift:  In: 720 [P.O.:720]  Out: -   I/O last 3 completed " shifts:  In: 3020 [P.O.:3020]  Out: 550 [Urine:550]    Physical Exam:  General Appearance: Alert, NAD  Head: normocephalic, without obvious abnormality and atraumatic  Eyes: conjunctivae and sclerae normal and no icterus  Neck: supple.  Lungs:CTA bilaterally  Heart: regular rhythm & normal rate and normal S1, S2 +DEDE  Chest: Wall no abnormalities observed  Abdomen: normal bowel sounds and soft non-tender  Extremities: moves extremities well, trace edema, no cyanosis and no redness  Skin: +LE WOUND  Neurologic: alert, NFD    Labs:    WBC WBC   Date Value Ref Range Status   12/12/2019 5.40 3.40 - 10.80 10*3/mm3 Final   12/11/2019 5.10 3.40 - 10.80 10*3/mm3 Final      HGB Hemoglobin   Date Value Ref Range Status   12/12/2019 8.0 (L) 13.0 - 17.7 g/dL Final   12/11/2019 8.2 (L) 13.0 - 17.7 g/dL Final   12/10/2019 10.6 (L) 12.0 - 17.0 g/dL Final      HCT Hematocrit   Date Value Ref Range Status   12/12/2019 25.2 (L) 37.5 - 51.0 % Final   12/11/2019 25.8 (L) 37.5 - 51.0 % Final   12/10/2019 31 (L) 38 - 51 % Final      Platlets No results found for: LABPLAT   MCV MCV   Date Value Ref Range Status   12/12/2019 92.7 79.0 - 97.0 fL Final   12/11/2019 92.4 79.0 - 97.0 fL Final          Sodium Sodium   Date Value Ref Range Status   12/13/2019 144 136 - 145 mmol/L Final   12/12/2019 143 136 - 145 mmol/L Final   12/11/2019 144 136 - 145 mmol/L Final      Potassium Potassium   Date Value Ref Range Status   12/13/2019 4.1 3.5 - 5.2 mmol/L Final   12/12/2019 4.5 3.5 - 5.2 mmol/L Final   12/11/2019 4.5 3.5 - 5.2 mmol/L Final   12/11/2019 4.5 3.5 - 5.2 mmol/L Final      Chloride Chloride   Date Value Ref Range Status   12/13/2019 106 98 - 107 mmol/L Final   12/12/2019 105 98 - 107 mmol/L Final   12/11/2019 105 98 - 107 mmol/L Final      CO2 CO2   Date Value Ref Range Status   12/13/2019 30.0 (H) 22.0 - 29.0 mmol/L Final   12/12/2019 29.0 22.0 - 29.0 mmol/L Final   12/11/2019 27.0 22.0 - 29.0 mmol/L Final      BUN BUN   Date Value Ref  Range Status   12/13/2019 72 (H) 6 - 20 mg/dL Final   12/12/2019 85 (H) 6 - 20 mg/dL Final   12/11/2019 96 (H) 6 - 20 mg/dL Final      Creatinine Creatinine   Date Value Ref Range Status   12/13/2019 1.88 (H) 0.76 - 1.27 mg/dL Final   12/12/2019 2.21 (H) 0.76 - 1.27 mg/dL Final   12/11/2019 2.57 (H) 0.76 - 1.27 mg/dL Final      Calcium Calcium   Date Value Ref Range Status   12/13/2019 8.4 (L) 8.6 - 10.5 mg/dL Final   12/12/2019 8.8 8.6 - 10.5 mg/dL Final   12/11/2019 9.1 8.6 - 10.5 mg/dL Final      PO4 No components found for: PO4   Albumin Albumin   Date Value Ref Range Status   12/13/2019 3.30 (L) 3.50 - 5.20 g/dL Final   12/12/2019 3.70 3.50 - 5.20 g/dL Final   12/11/2019 3.60 3.50 - 5.20 g/dL Final      Magnesium Magnesium   Date Value Ref Range Status   12/12/2019 2.5 1.6 - 2.6 mg/dL Final   12/11/2019 2.7 (H) 1.6 - 2.6 mg/dL Final      Uric Acid No components found for: URIC ACID     Imaging Results (Last 72 Hours)     Procedure Component Value Units Date/Time    CT Head Without Contrast [[id number]] Collected:  12/11/19 1309     Updated:  12/11/19 1317    Narrative:          DATE OF EXAM:  12/11/2019 12:00 PM     PROCEDURE:   CT HEAD WO CONTRAST-     INDICATIONS:   Confusion/delirium, altered level of consciousness.     COMPARISON:  8/27/2019.     TECHNIQUE:   Routine transaxial cuts were obtained through the head without the  administration of contrast. Automated exposure control and iterative  reconstruction methods were used.      FINDINGS:  There are areas of decreased density seen in the white matter tracts  similar to the previous CT study. This may be due to chronic  microvascular ischemia, although somewhat unusual in a patient this age.  There are small old lacunar infarcts seen within the right caudate head  nucleus and left lentiform nucleus. The cerebral sulci, fissures,  ventricles, and basal cisterns appear normal. There is no acute  hemorrhage, midline shift, or suspicious extra-axial-axial  fluid  collections. The orbital contents are normal. The mastoid sinuses are  clear. There is mild mucosal thickening in the maxillary and ethmoid  sinuses indicating chronic sinus disease.        Impression:       No interval change from the previous study with abnormalities as  described above.     Electronically Signed By-Bar James On:12/11/2019 1:15 PM  This report was finalized on [id number] by  Bar James, .     Liver [[id number]] Collected:  12/11/19 0027     Updated:  12/11/19 0229    Narrative:       Exam: Liver ultrasound    INDICATION: Cirrhosis.  Check for cancer    FINDINGS:  Liver is inhomogeneous consistent with cirrhosis.  There is moderate ascites.  However, no mass lesions are noted.  Portal vein is patent.    Call bladder wall is thickened measuring 10 mm.  No obvious gallstones.  Common bile duct not visualized.      Impression:       1.  Cirrhosis with moderate ascites.  2.  There is a thickened gallbladder wall and gallstones.  This can be a sign of cholecystitis.  If there are signs or symptoms of cholecystitis consider HIDA scan to check gallbladder function.    Electronically signed by:  Serafin Carrillo M.D.    12/11/2019 12:28 AM    XR Chest 1 View [[id number]] Collected:  12/10/19 1344     Updated:  12/10/19 1349    Narrative:       DATE OF EXAM:  12/10/2019 1:17 PM     PROCEDURE:  XR CHEST 1 VW-     INDICATIONS:  hypoxia; N17.9-Acute kidney failure, unspecified; N18.9-Chronic kidney  disease, unspecified; D64.9-Anemia, unspecified     COMPARISON:  Chest radiographs 12/6/2019 and 8/21/2019. CT abdomen pelvis 3/10/2019.  CT chest 5/19/2018.     TECHNIQUE:   Single radiographic AP view of the chest was obtained.     FINDINGS:  The study is limited by lordotic patient positioning. Overlying  artifacts. Stable sternotomy wires and postoperative changes in the  mediastinum. Low lung volumes with elevation of the right hemidiaphragm.  Stable patchy hazy opacities in both lungs. No  pneumothorax. Unchanged  cardiomediastinal contours. No acute osseous abnormality is identified.        Impression:       Limited study demonstrating stable patchy hazy opacities in both lungs,  which could represent edema, atypical pneumonia, or chronic lung  disease.     Electronically Signed By-Allan Salomon On:12/10/2019 1:47 PM  This report was finalized on [id number] by  Allan Salomon, .          Results for orders placed during the hospital encounter of 12/06/19   XR Chest 1 View    Narrative DATE OF EXAM:  12/10/2019 1:17 PM     PROCEDURE:  XR CHEST 1 VW-     INDICATIONS:  hypoxia; N17.9-Acute kidney failure, unspecified; N18.9-Chronic kidney  disease, unspecified; D64.9-Anemia, unspecified     COMPARISON:  Chest radiographs 12/6/2019 and 8/21/2019. CT abdomen pelvis 3/10/2019.  CT chest 5/19/2018.     TECHNIQUE:   Single radiographic AP view of the chest was obtained.     FINDINGS:  The study is limited by lordotic patient positioning. Overlying  artifacts. Stable sternotomy wires and postoperative changes in the  mediastinum. Low lung volumes with elevation of the right hemidiaphragm.  Stable patchy hazy opacities in both lungs. No pneumothorax. Unchanged  cardiomediastinal contours. No acute osseous abnormality is identified.        Impression Limited study demonstrating stable patchy hazy opacities in both lungs,  which could represent edema, atypical pneumonia, or chronic lung  disease.     Electronically Signed By-Allan Salomon On:12/10/2019 1:47 PM  This report was finalized on [id number] by  Allan Salomon, .   XR Chest 1 View    Narrative XR CHEST 1 VW-     Date of Exam: 12/6/2019 6:55 PM     Indication: sob; N17.9-Acute kidney failure, unspecified; N18.9-Chronic  kidney disease, unspecified; D64.9-Anemia, unspecified  39-year-old  history of lupus, on home oxygen, anemia     Comparison: 08/21/2019, 03/29/2019     Technique: 1 view(s) of the chest were obtained.     FINDINGS: There is still volume  loss in the right chest which is  likely postsurgical. There are also sternotomy wires and postsurgical  changes in the mediastinum. Alveolar and interstitial opacities  bilaterally are present similar in appearance and distribution to  8/21/2019 and 3/29/2019. No pneumothorax or definite pleural effusion.  There is calcification along the right diaphragm which is unchanged.  Trachea is midline. No definite pleural effusions.       Impression There are postsurgical changes in the right chest as well as sternotomy  wires and surgical clips in the mediastinum. No significant change in  alveolar and airspace opacities bilaterally which may be chronic or due  to recurrent infection.        Electronically Signed By-Yogesh Salmeron DO. On:12/6/2019 8:18 PM  This report was finalized on [id number] by  Yogesh Salmeron DO..              ASSESSMENT / PLAN      Acute renal failure superimposed on chronic kidney disease (CMS/HCC)    CAPS (catastrophic antiphospholipid syndrome) (CMS/HCC)    Benign essential hypertension    Substance abuse in remission (CMS/HCC)    Deep vein thrombosis (DVT) of lower extremity (CMS/HCC)    Hepatic cirrhosis (CMS/HCC)    PVD (peripheral vascular disease) (CMS/HCC)    SLE (systemic lupus erythematosus) (CMS/HCC)    Venous stasis ulcer of left calf limited to breakdown of skin without varicose veins (CMS/HCC)    Other ascites    Acute on chronic respiratory failure with hypoxia and hypercapnia (CMS/HCC)    Anemia, chronic disease    Hyperkalemia    Secondary hyperparathyroidism (CMS/HCC)    Delirium, drug-induced (CMS/HCC)      1. TABBY/CKD STG 3----TABBY on top of known CRF/CKD stage 3, with a baseline serum creatinine of around 1.6-1.7. CRF/CKD STG 3 is secondary to HTN NS/SLE/history of biopsy proven MERYL which may be recurrent. Renal US ok. +ARF/TABBY appears to be secondary to prerenal state and occasional NSAID use and likely recurrent MERYL. No obstructive uropathy on US. Off IVFs. No NSAIDs. No IV  dye unless urgently needed.  Dose medsfor CrCL<10 cc/min. Follow for dialysis need. No obvious uremia at present. Patient refuses renal biopsy and would be high risk given thrombocytopenia anyway     2. PERSISTENT PROTEINURIA---2.3 GM. 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  Patient had Refused biopsy and not likely to be beneficial at present. On prednisone     3. HISTORY OF LUPUS NEPHRITIS---S/P 3 days of pulse steroids. Change to po Pred     4. HISTORY OF MINIMAL CHANGE DISEASE--- quantify proteinuria.  Steroids for likely relapse     5. HTN WITH CKD--- BP ok. No ACE/ARB/DRI     6. HYPOALBUMINEMIA--- secondary to proteinuria and poor PO intake. S/P IV Albumin to temporize     7. ANEMIA OF CKD--- Venofer for JUNG. Follow for PRBC need. EPO started     8. HYPONATREMIA-- Resolved. Follow with little diuretic exposure this AM     9. HYPOCALCEMIA--- Replaced IV.      10. HYPERKALEMIA--- Resolved. Medically treated with Rx with Kayexalate, bicarb, IV Humulin R (without D50 b/c sugars high), and CaGluconate. On K+ restriction in diet. On telemetry. Follow levels closely     11. CUMMINS---- Follow ascites for paracentesis need     12. MEDICAL NONCOMPLIANCE      13. PANCYTOPENIA-------?recurrent autoimmune thrombocytopenia. Last hospitalization, patient required plasmapheresis. , Hem/Onc to see    14. ACIDOSIS-------Metabolic. No elevation in AGAP. +Type 4 RTA. Continue po NaHCO3    15. RF ASSOCIATED HYPERPHOSPHATEMIA------Better. D/C Phoslo    16. SECONDARY HYPERPARATHYROIDISM    17. ANTIPHOSPHOLIPID SYNDROME/HISTORY OF PE---status post IVC filter.  Currently on Arixtra.    18. DELERIUM-------Better    Non oliguric, creatinine better  Continue prednisone  Will add lasix 40 mg daily for fluid retention  Wants to go home, if discharged will need BMP in 2-3 days, and follow up in 2 weeks    Kennedy Rodas MD  [facility]  [phone]  12/13/19  6:51 AM

## 2019-12-13 NOTE — DISCHARGE SUMMARY
[facility]   DISCHARGE SUMMARY    Patient Name: Lorenzo Crockett  : 1980  MRN: [id number]    Date of Admission: 2019  Date of Discharge:  2019    Primary Care Physician: Lori Hale NP    Consults     Date and Time Order Name Status Description    12/10/2019 1306 Inpatient Rheumatology Consult      12/10/2019 0719 Inpatient Gastroenterology Consult Completed     2019 1040 Inpatient Pain Medicine Consult      2019 0857 Inpatient Infectious Diseases Consult      2019 0847 Hematology & Oncology Inpatient Consult Completed     2019 1818 Inpatient Nephrology Consult Completed     2019 1609 Hospitalist (on-call MD unless specified) Completed           Hospital Course     Presenting Problem:   Anemia, unspecified type [D64.9]  Acute renal failure superimposed on chronic kidney disease, unspecified CKD stage, unspecified acute renal failure type (CMS/HCC) [N17.9, N18.9]  Acute renal failure superimposed on chronic kidney disease, unspecified CKD stage, unspecified acute renal failure type (CMS/HCC) [N17.9, N18.9]    Active Hospital Problems:  * Acute renal failure superimposed on chronic kidney disease (CMS/HCC)- (present on admission)  Etiology; prerenal vs renal /possible lupus/MERYL flare up on CKD stage 3 per renal- on pulse dose steroid for 3 days solumedrol- changed to po prednisone daily  Recently completed zithromax for 5 days for URI sx  Non oliguric per pt  Urine lyte   Hypotensive- on multiple regimen home, hold amlodipine, clonidine but continue on Coreg at low dose  Renal US - no obstruction but mild cortical thinning, echogenic kidney  Fluid and albumin 25g x2    Nephrology on board  Cr trending down  Slowly - had paracentesis         Other ascites- (present on admission)  S/p 2 paracentesis - last one 2019  Getting worse gradually recently    worse in ascites since admission, possibly due to fluid - paracentesis by IR- 6 liter off today, albumin 25g  given          Hyperkalemia- (present on admission)  Due to renal failure  Receive multiple regimen by renal  resolved    Substance abuse in remission (CMS/HCC)- (present on admission)  On subotex due to addiction issue initially but due to pain control also since he had 2 major lung surgery  Followed by pain clinic- oxy was discontinued  But on Subotex, ketamine     Re-Consult to pain clinic - not available  Watch for constipation for opiate related  Reported taking his own pain med in bag pack- taken away yesterday- since then, confused, suspicious for withdrawal  Will dc ketamine for suspicious side effect - agitation and aggression am    Anemia, chronic disease- (present on admission)  Due to mild JUNG and ACD  Followed by Dr. Mak for CAPS   Iv and po iron and procrit per oncology  B12 supplement    Amputation of foot (CMS/HCC)  Due to arterial clot in LLE     Acute on chronic respiratory failure with hypoxia and hypercapnia (CMS/HCC)- (present on admission)  Wearing O2, ryann desat at night  Etiology - due to lobectomy of lung or other  nonsmoker   More hypoxic today with mild hypercarbia- placed on bipap   cxr - almost unchanged   BNP elevated  resumed bumex for possible volume overload/home regimen    SLE (systemic lupus erythematosus) (CMS/HCC)- (present on admission)  Not on specific med     Deep vein thrombosis (DVT) of lower extremity (CMS/HCC)- (present on admission)  On Arixtra   S/p IVC filter    Benign essential hypertension- (present on admission)  On clonidine, coreg and amlodipine and bumex home - held except low dose coreg  bp improved, off midodrine      Delirium, drug-induced (CMS/HCC)  Suspicious for withdrawal  Also getting lactulose for possible hepatic encephalopathy  UDS positive for oxy- he was on a few days ago  Got  zyprexa  last night - discontinued since back to baseline in mentation  Discontinued ketamine    Secondary hyperparathyroidism (CMS/HCC)- (present on admission)   Due to  CKD    Venous stasis ulcer of left calf limited to breakdown of skin without varicose veins (CMS/HCC)- (present on admission)  Seen by ID, getting doxy for possible infection  Zosyn discontinued  Wound cx - MRSA - on doxy for 7 days per ID  Applied bactroban cream by ID     PVD (peripheral vascular disease) (CMS/HCC)- (present on admission)  Got clot on LLE at the age 14, s/p bypass surgery     Hepatic cirrhosis (CMS/HCC)- (present on admission)  Etiology- unclear, possibly related to lupus per pt   Normal LFTs    CAPS (catastrophic antiphospholipid syndrome) (CMS/HCC)- (present on admission)  Multiple recurrent PE and DVT -s/p lobectomy, both lung due to clots    S/p plasmapheresis, iv IG, high dose steroid, this year   On arixtra- initially coumadin and lovenox  Dr. Mak - hematologist  Not following with rheumatology      Resolved Hospital Problems:  No notes have been filed under this hospital service.  Service: Hospitalist        Hospital Course:  Lorenzo Crockett is a 39 y.o. male  With complex PMH of CAPS, recurrent PE/DVT, PVD, SLE, CKD stage 3, home O2 prn, CUMMINS induced liver cirrhosis with ascites, chronic anemia who is presenting with acute renal failure, though pt claimes he has been in usual health. Work up showed possible lupus flare up but pt declined renal biopsy. Pt was hydrated for possible prerenal etiology. Empirically pulse dose of iv solumedrol for 3 days and followed by po predisone 40mg daily. Renal function is almost back to his baseline. Meantime complicated by hypotension for which he received Midodrine and diuretics and BP meds were held. Since BP is improving, coreg was resumed at full dose but amlodipine and clonidine were held till now. This may need to be resumed if bp stays elevated home.     Meantime he was complicated by acute CHF exa and delirium and hypoxia. He was treated by iv lasix and CHF resolved. Delirium was felt to be related to drug withdrawal since he was taking  his own medications from his bag and got confused after med were taken away. Drug seeking behavior was noted but continued on Subotex only.     Some medications were readjusted per renal dose    He had paracentesis with 6 liter off and albumin was given. xifaxan and lactulose was applied this time. Seen by GI with fu as outpt for possible EGD and liver biopsy for newly diagnosed liver cirrhosis    Followed by hem/onco for chronic anemia and thrombocytopenia during the stay    Overall he was stabilized and renal function is close to his baseline. He will continue on prednisone now and fu with renal in 2wks with repeat BMP.      Discharge Follow Up Recommendations for labs/diagnostics:     Repeat BMP  In 2wks with renal    Day of Discharge     HPI:   39 y.o. male  With complex medical history of CAPS, recurrent PE and DVT on arixtra, L foot amputation due to clot, s/p LLE bypass surgery, chronic hypoxia on home O2 24/7, liver cirrhosis with ascites, s/p paracentesis twice, SLE and CKD stage 3 who was sent in by  due to worsening renal function.Cr 3.9. Baseline 1.6-1.7.   He denies any change in his health but reports worsening ascites lately. Denies weight gain. Unable to eat much due to easy satiety due to ascites. But still eating and drinking home. Denies abdominal pain or fever  No JVD. Trace leg edema.      Labs reviewed. Chronic anemia steady. No sign of volume overload but will do CXR. No acidosis. Normal LFTs  Vs- low BP 90-100s on multiple BP regimen, no fever. No tachycardia     Pt was admitted for further management.     Vital Signs:   Temp:  [98.7 °F (37.1 °C)-99.3 °F (37.4 °C)] 99.3 °F (37.4 °C)  Heart Rate:  [] 91  Resp:  [16-18] 17  BP: (146-176)/() 146/88     Physical Exam:  Constitutional: He is oriented to person, place, and time. He appears well-developed. No distress.   HENT:   Head: Normocephalic and atraumatic.   Nose: Nose normal.   Mouth/Throat: No oropharyngeal exudate.    Eyes: Conjunctivae and EOM are normal. Pupils are equal, round, and reactive to light.   Neck: Normal range of motion. Neck supple.   Cardiovascular: Normal rate and regular rhythm.   Murmur heard.  Pulmonary/Chest: Effort normal and breath sounds normal. No respiratory distress. He has no wheezes. He has no rales. He exhibits no tenderness.   Abdominal: Soft. Bowel sounds are normal. He exhibits distension, better after paracentesis, due to ascites. There is no tenderness. There is no rebound.   Musculoskeletal: Normal range of motion. + leg edema  Neurological: He is alert and oriented to person, place, and time. No cranial nerve deficit.   L foot MTA   Skin: Skin is warm and dry.   Very poor pedal pulse in both feet   Psychiatric: He has a normal mood and affect. His behavior is normal. Judgment and thought content normal       Pertinent  and/or Most Recent Results     Results from last 7 days   Lab Units 12/13/19  0441 12/12/19  0300 12/11/19  0345 12/11/19  0238 12/10/19  0718 12/10/19  0422 12/09/19  0458 12/09/19  0437 12/08/19  1247 12/08/19  0334 12/07/19  0721 12/06/19  1540   WBC 10*3/mm3  --  5.40  --  5.10  --  5.10  --  6.30  --  3.20* 5.10 6.60   HEMOGLOBIN g/dL  --  8.0*  --  8.2*  --  8.2*  --  8.4*  --  8.4* 8.0* 8.6*   HEMOGLOBIN, POC g/dL  --   --   --   --  10.6*  --   --   --   --   --   --   --    HEMATOCRIT %  --  25.2*  --  25.8*  --  26.2*  --  25.8*  --  25.6* 24.9* 26.6*   HEMATOCRIT POC %  --   --   --   --  31*  --   --   --   --   --   --   --    PLATELETS 10*3/mm3  --  51*  --  52*  --  50*  --  94*  --  106* 107* 118*   SODIUM mmol/L 144 143 144  --   --  142 140  --   --  134* 133* 134*   POTASSIUM mmol/L 4.1 4.5 4.5  4.5  --   --  4.4 4.4  --  5.0 5.6* 5.0 5.0   CHLORIDE mmol/L 106 105 105  --   --  103 100  --   --  97* 98 94*   CO2 mmol/L 30.0* 29.0 27.0  --   --  26.0 25.0  --   --  21.0* 22.0 26.0   BUN mg/dL 72* 85* 96*  --   --  93* 92*  --   --  93* 100* 96*   CREATININE  mg/dL 1.88* 2.21* 2.57*  --   --  2.87* 3.19*  --   --  3.61* 3.93* 3.92*   GLUCOSE mg/dL 100* 159* 113*  --   --  141* 145*  --   --  203* 99 99   CALCIUM mg/dL 8.4* 8.8 9.1  --   --  8.7 8.9  --   --  8.3* 8.3* 8.4*     Results from last 7 days   Lab Units 12/12/19  0300 12/11/19  0345 12/11/19  0238 12/10/19  0914 12/10/19  0422 12/09/19  0458 12/08/19  0334 12/06/19  1540   BILIRUBIN mg/dL 0.2 0.2  --   --  0.2 <0.2* <0.2* <0.2*   ALK PHOS U/L 65 67  --   --  75 82 88 95   ALT (SGPT) U/L 8 8  --   --  9 10 <5 7   AST (SGOT) U/L 9 8  --   --  9 8 7 7   PROTIME Seconds 13.6*  --  13.8* 14.8*  --   --   --   --    INR  1.36*  --  1.38* 1.50*  --   --   --   --            Invalid input(s): TG, LDLCALC, LDLREALC  Results from last 7 days   Lab Units 12/11/19  0238 12/10/19  0718 12/10/19  0422 12/07/19  0837   TSH uIU/mL  --   --   --  2.780   PROBNP pg/mL  --   --  >70,000.0*  --    LACTATE mmol/L 0.6 3.2*  --   --        Brief Urine Lab Results  (Last result in the past 365 days)      Color   Clarity   Blood   Leuk Est   Nitrite   Protein   CREAT   Urine HCG        12/07/19 1824             98.7             Microbiology Results Abnormal     Procedure Component Value - Date/Time    Blood Culture - Blood, Arm, Left [[id number]] Collected:  12/08/19 1004    Lab Status:  Final result Specimen:  Blood from Arm, Left Updated:  12/13/19 1028     Blood Culture No growth at 5 days    Blood Culture - Blood, Wrist, Right [[id number]] Collected:  12/08/19 1005    Lab Status:  Final result Specimen:  Blood from Wrist, Right Updated:  12/13/19 1028     Blood Culture No growth at 5 days    Wound Culture - Wound, Leg, Left [[id number]]  (Abnormal)  (Susceptibility) Collected:  12/08/19 1613    Lab Status:  Final result Specimen:  Wound from Leg, Left Updated:  12/10/19 0812     Wound Culture Moderate growth (3+) Staphylococcus aureus, MRSA     Comment:   Methicillin resistant Staphylococcus aureus, Patient may be an isolation risk.         Gram Stain Few (2+) WBCs observed - predominantly neutrophils      Occasional Gram positive cocci    Susceptibility      Staphylococcus aureus, MRSA     PAULA     Clindamycin Susceptible     Erythromycin Resistant     Inducible Clindamycin Resistance Negative     Oxacillin Resistant     Penicillin G Resistant     Rifampin Susceptible     Tetracycline Susceptible     Trimethoprim + Sulfamethoxazole Susceptible     Vancomycin Susceptible                Susceptibility Comments     Staphylococcus aureus, MRSA    This isolate does not demonstrate inducible clindamycin resistance in vitro.               Eosinophil Smear - Urine, Urine, Clean Catch [[id number]]  (Normal) Collected:  12/07/19 1824    Lab Status:  Final result Specimen:  Urine, Clean Catch Updated:  12/07/19 1925     Eosinophil Smear 0 % EOS/100 Cells           Ct Head Without Contrast    Result Date: 12/11/2019  Impression: No interval change from the previous study with abnormalities as described above.  Electronically Signed By-Bar James On:12/11/2019 1:15 PM This report was finalized on [id number] by  Bar James, .    Us Liver    Result Date: 12/11/2019  Impression: 1.  Cirrhosis with moderate ascites. 2.  There is a thickened gallbladder wall and gallstones.  This can be a sign of cholecystitis.  If there are signs or symptoms of cholecystitis consider HIDA scan to check gallbladder function. Electronically signed by:  Serafin Carrillo M.D.  12/11/2019 12:28 AM    Xr Chest 1 View    Result Date: 12/10/2019  Impression: Limited study demonstrating stable patchy hazy opacities in both lungs, which could represent edema, atypical pneumonia, or chronic lung disease.  Electronically Signed By-Allan Salomon On:12/10/2019 1:47 PM This report was finalized on [id number] by  Allan Salomon, .    Xr Chest 1 View    Result Date: 12/6/2019  Impression: There are postsurgical changes in the right chest as well as sternotomy wires and surgical clips in the  mediastinum. No significant change in alveolar and airspace opacities bilaterally which may be chronic or due to recurrent infection.   Electronically Signed By-Yogesh Salmeron DO. On:12/6/2019 8:18 PM This report was finalized on [id number] by  Yogesh Salmeron DO..    Us Renal Bilateral    Result Date: 12/6/2019  Impression: Markedly echogenic kidneys with mild cortical thinning. No hydronephrosis. Moderate amount of ascites.  Electronically Signed By-Yogesh Salmeron DO. On:12/6/2019 11:20 PM This report was finalized on [id number] by  Yogesh Salmeron DO..                       Discharge Details        Discharge Medications      New Medications      Instructions Start Date   doxycycline 100 MG tablet  Commonly known as:  ADOXA   100 mg, Oral, Every 12 Hours Scheduled      lactulose 10 GM/15ML solution  Commonly known as:  CHRONULAC   20 g, Oral, Daily, pls keep BM 2-3 per day by taking extra lactulose 30ml  as needed      mupirocin 2 % ointment  Commonly known as:  BACTROBAN   Topical, Every 12 Hours Scheduled      omeprazole OTC 20 MG EC tablet  Commonly known as:  PrilOSEC OTC   20 mg, Oral, Daily      predniSONE 20 MG tablet  Commonly known as:  DELTASONE   40 mg, Oral, Daily With Breakfast   Start Date:  December 14, 2019     riFAXIMin 550 MG tablet  Commonly known as:  XIFAXAN   550 mg, Oral, Every 12 Hours Scheduled         Changes to Medications      Instructions Start Date   gabapentin 800 MG tablet  Commonly known as:  NEURONTIN  What changed:    · how much to take  · when to take this   400 mg, Oral, 3 Times Daily         Continue These Medications      Instructions Start Date   B-12 500 MCG sublingual tablet   1 tablet, Sublingual, 2 Times Daily      baclofen 20 MG tablet  Commonly known as:  LIORESAL   10 mg, Oral, 3 Times Daily PRN      bumetanide 1 MG tablet  Commonly known as:  BUMEX   1 mg, Oral, 2 Times Daily      buprenorphine 8 MG sublingual tablet SL tablet  Commonly known as:  SUBUTEX   20  mg, Sublingual, Daily, Unknown if pt took dose 08/21/19 & quantity      calcium carbonate 600 MG tablet  Commonly known as:  OS-BLU   600 mg, Oral, 2 Times Daily      carvedilol 25 MG tablet  Commonly known as:  COREG   25 mg, Oral, 2 Times Daily With Meals      clonazePAM 0.5 MG tablet  Commonly known as:  KlonoPIN   0.5 mg, Oral, 2 Times Daily      famotidine 20 MG tablet  Commonly known as:  PEPCID   20 mg, Oral, 2 Times Daily PRN      ferrous sulfate 324 (65 Fe) MG tablet delayed-release EC tablet   324 mg, Oral, Daily      folic acid 1 MG tablet  Commonly known as:  FOLVITE   1 mg, Oral, Daily      fondaparinux 7.5 MG/0.6ML solution injection  Commonly known as:  ARIXTRA   7.5 mg, Subcutaneous, Daily      lacosamide 100 MG tablet tablet  Commonly known as:  VIMPAT   100 mg, Oral, 2 Times Daily      levETIRAcetam 500 MG tablet  Commonly known as:  KEPPRA   500 mg, Oral, 2 Times Daily      prochlorperazine 5 MG tablet  Commonly known as:  COMPAZINE   5 mg, Oral, Every 8 Hours PRN      QUEtiapine 50 MG tablet  Commonly known as:  SEROquel   50 mg, Oral, Nightly PRN      rOPINIRole 0.5 MG tablet  Commonly known as:  REQUIP   0.5 mg, Oral, Daily PRN      sertraline 50 MG tablet  Commonly known as:  ZOLOFT   50 mg, Oral, Daily         Stop These Medications    amLODIPine 10 MG tablet  Commonly known as:  NORVASC     cloNIDine 0.1 MG tablet  Commonly known as:  CATAPRES            Allergies   Allergen Reactions   • Tramadol Other (See Comments)     seizure   • Melon Rash         Discharge Disposition:  Home or Self Care    Diet:  Hospital:  Diet Order   Procedures   • Diet Renal; 2gm Na+, 2gm K+         Discharge Activity: as tolerated        CODE STATUS:    Code Status and Medical Interventions:   Ordered at: 12/06/19 2883     Level Of Support Discussed With:    Patient     Code Status:    CPR     Medical Interventions (Level of Support Prior to Arrest):    Full         Future Appointments   Date Time Provider  Department Center   12/18/2019  3:00 PM Oma Mak MD [facility] ON[facility] NA None   12/18/2019  3:00 PM LAB MD [facility] LAB NA [facility] None       Additional Instructions for the Follow-ups that You Need to Schedule     Basic Metabolic Panel    Dec 17, 2019 (Approximate)            Time spent on Discharge including face to face service:  45 minutes    Electronically signed by Pretty Sevilla MD, 12/13/19, 2:53 PM.

## 2019-12-13 NOTE — PROGRESS NOTES
Continued Stay Note   Ed     Patient Name: Lorenzo Crockett  MRN: [id number]  Today's Date: 12/13/2019    Admit Date: 12/6/2019    Discharge plan: Return home with parents.    Barriers to discharge: Delirium -believed to be related to drug withdrawal. Pt was found to be taking pain meds from his back pack and those were removed from his possession.             Expected Discharge Date and Time     Expected Discharge Date Expected Discharge Time    Dec 9, 2019             Camila Gentile RN

## 2019-12-14 ENCOUNTER — READMISSION MANAGEMENT (OUTPATIENT)
Dept: CALL CENTER | Facility: HOSPITAL | Age: 39
End: 2019-12-14

## 2019-12-14 NOTE — OUTREACH NOTE
Prep Survey      Responses   Facility patient discharged from?  [facility]   Is patient eligible?  Yes   Discharge diagnosis  Anemia,  Acute renal failure,  ascites   Does the patient have one of the following disease processes/diagnoses(primary or secondary)?  Other   Does the patient have Home health ordered?  No   Is there a DME ordered?  No   Comments regarding appointments  Pt to have BMP completed byu 12/17/19   Prep survey completed?  Yes          Mi Carlos RN

## 2019-12-17 ENCOUNTER — READMISSION MANAGEMENT (OUTPATIENT)
Dept: CALL CENTER | Facility: HOSPITAL | Age: 39
End: 2019-12-17

## 2019-12-18 NOTE — OUTREACH NOTE
Medical Week 1 Survey      Responses   Facility patient discharged from?  [facility]   Does the patient have one of the following disease processes/diagnoses(primary or secondary)?  Other   Is there a successful TCM telephone encounter documented?  No   Call end time  1949   Meds reviewed with patient/caregiver?  Yes   Is the patient having any side effects they believe may be caused by any medication additions or changes?  No   Does the patient have all medications ordered at discharge?  Yes   Is the patient taking all medications as directed (includes completed medication regime)?  Yes   Does the patient have a primary care provider?   Yes   Does the patient have an appointment with their PCP within 7 days of discharge?  Yes   Has the patient kept scheduled appointments due by today?  Yes   Comments  States he saw Dr. Robbins today   Has home health visited the patient within 72 hours of discharge?  N/A   Did the patient receive a copy of their discharge instructions?  Yes   Nursing interventions  Reviewed instructions with patient   What is the patient's perception of their health status since discharge?  Improving   Is the patient/caregiver able to teach back signs and symptoms related to disease process for when to call PCP?  Yes   Is the patient/caregiver able to teach back signs and symptoms related to disease process for when to call 911?  Yes   Is the patient/caregiver able to teach back the hierarchy of who to call/visit for symptoms/problems? PCP, Specialist, Home health nurse, Urgent Care, ED, 911  Yes   Additional teach back comments  Patient states he has all medications and appts made.  Saw Dr. Robbins today.  Wanting to speak with someone about concerns he had during his stay.  Email sent to JASPREET Wheeler regarding this.   Week 1 call completed?  Yes   Wrap up additional comments  Email sent regarding patient's concerns regarding his hospital stay          Kathryn Sanches LPN

## 2019-12-27 ENCOUNTER — READMISSION MANAGEMENT (OUTPATIENT)
Dept: CALL CENTER | Facility: HOSPITAL | Age: 39
End: 2019-12-27

## 2019-12-27 NOTE — OUTREACH NOTE
Medical Week 2 Survey      Responses   Facility patient discharged from?  [facility]   Does the patient have one of the following disease processes/diagnoses(primary or secondary)?  Other   Week 2 attempt successful?  No   Rescheduled  Revoked   Revoke  Decline to participate [NO ANSWER, LEFT VM]          Elmira Staton LPN

## 2020-01-19 ENCOUNTER — INPATIENT HOSPITAL (OUTPATIENT)
Dept: URBAN - METROPOLITAN AREA HOSPITAL 76 | Facility: HOSPITAL | Age: 40
End: 2020-01-19
Payer: MEDICAID

## 2020-01-19 DIAGNOSIS — R14.0 ABDOMINAL DISTENSION (GASEOUS): ICD-10-CM

## 2020-01-19 DIAGNOSIS — K74.60 UNSPECIFIED CIRRHOSIS OF LIVER: ICD-10-CM

## 2020-01-19 DIAGNOSIS — D50.9 IRON DEFICIENCY ANEMIA, UNSPECIFIED: ICD-10-CM

## 2020-01-19 DIAGNOSIS — K76.6 PORTAL HYPERTENSION: ICD-10-CM

## 2020-01-19 DIAGNOSIS — R18.8 OTHER ASCITES: ICD-10-CM

## 2020-01-19 PROCEDURE — 99222 1ST HOSP IP/OBS MODERATE 55: CPT | Performed by: NURSE PRACTITIONER

## 2020-01-20 ENCOUNTER — INPATIENT HOSPITAL (OUTPATIENT)
Dept: URBAN - METROPOLITAN AREA HOSPITAL 76 | Facility: HOSPITAL | Age: 40
End: 2020-01-20

## 2020-01-20 DIAGNOSIS — K74.60 UNSPECIFIED CIRRHOSIS OF LIVER: ICD-10-CM

## 2020-01-20 DIAGNOSIS — K76.6 PORTAL HYPERTENSION: ICD-10-CM

## 2020-01-20 DIAGNOSIS — R18.8 OTHER ASCITES: ICD-10-CM

## 2020-01-20 DIAGNOSIS — R14.0 ABDOMINAL DISTENSION (GASEOUS): ICD-10-CM

## 2020-01-20 DIAGNOSIS — N18.9 CHRONIC KIDNEY DISEASE, UNSPECIFIED: ICD-10-CM

## 2020-01-20 PROCEDURE — 99232 SBSQ HOSP IP/OBS MODERATE 35: CPT | Performed by: NURSE PRACTITIONER

## 2020-01-21 PROCEDURE — 99232 SBSQ HOSP IP/OBS MODERATE 35: CPT | Performed by: NURSE PRACTITIONER

## 2020-01-24 ENCOUNTER — INPATIENT HOSPITAL (OUTPATIENT)
Dept: URBAN - METROPOLITAN AREA HOSPITAL 76 | Facility: HOSPITAL | Age: 40
End: 2020-01-24
Payer: MEDICAID

## 2020-01-24 DIAGNOSIS — N18.9 CHRONIC KIDNEY DISEASE, UNSPECIFIED: ICD-10-CM

## 2020-01-24 DIAGNOSIS — R18.8 OTHER ASCITES: ICD-10-CM

## 2020-01-24 DIAGNOSIS — K76.6 PORTAL HYPERTENSION: ICD-10-CM

## 2020-01-24 DIAGNOSIS — D50.9 IRON DEFICIENCY ANEMIA, UNSPECIFIED: ICD-10-CM

## 2020-01-24 DIAGNOSIS — K74.60 UNSPECIFIED CIRRHOSIS OF LIVER: ICD-10-CM

## 2020-01-24 DIAGNOSIS — R14.0 ABDOMINAL DISTENSION (GASEOUS): ICD-10-CM

## 2020-01-24 PROCEDURE — 99233 SBSQ HOSP IP/OBS HIGH 50: CPT | Performed by: NURSE PRACTITIONER

## 2020-02-14 ENCOUNTER — LAB REQUISITION (OUTPATIENT)
Dept: LAB | Facility: HOSPITAL | Age: 40
End: 2020-02-14

## 2020-02-14 DIAGNOSIS — Z00.00 ENCOUNTER FOR GENERAL ADULT MEDICAL EXAMINATION WITHOUT ABNORMAL FINDINGS: ICD-10-CM

## 2020-02-14 LAB
AMMONIA BLD-SCNC: 61 UMOL/L (ref 16–60)
HBV SURFACE AG SERPL QL IA: NORMAL
TSH SERPL DL<=0.05 MIU/L-ACNC: 2.07 UIU/ML (ref 0.27–4.2)

## 2020-02-14 PROCEDURE — 84443 ASSAY THYROID STIM HORMONE: CPT | Performed by: PHYSICAL MEDICINE & REHABILITATION

## 2020-02-14 PROCEDURE — 87340 HEPATITIS B SURFACE AG IA: CPT | Performed by: PHYSICAL MEDICINE & REHABILITATION

## 2020-02-14 PROCEDURE — 82140 ASSAY OF AMMONIA: CPT

## 2020-02-15 LAB — HOLD SPECIMEN: NORMAL

## 2020-03-31 ENCOUNTER — INPATIENT HOSPITAL (OUTPATIENT)
Dept: URBAN - METROPOLITAN AREA HOSPITAL 76 | Facility: HOSPITAL | Age: 40
End: 2020-03-31
Payer: COMMERCIAL

## 2020-03-31 DIAGNOSIS — R18.8 OTHER ASCITES: ICD-10-CM

## 2020-03-31 DIAGNOSIS — N19 UNSPECIFIED KIDNEY FAILURE: ICD-10-CM

## 2020-03-31 DIAGNOSIS — K74.60 UNSPECIFIED CIRRHOSIS OF LIVER: ICD-10-CM

## 2020-03-31 DIAGNOSIS — K75.81 NONALCOHOLIC STEATOHEPATITIS (NASH): ICD-10-CM

## 2020-03-31 PROCEDURE — 99223 1ST HOSP IP/OBS HIGH 75: CPT | Performed by: INTERNAL MEDICINE

## 2020-04-01 PROCEDURE — 99232 SBSQ HOSP IP/OBS MODERATE 35: CPT | Performed by: INTERNAL MEDICINE

## 2022-10-05 NOTE — ED NOTES
DYSURIA  Acetaminophen or Ibuprofen (with food) per pkg instructions  Can administer over the counter Phenazopyridine (i.e Azo, cystex) per pkg instructions (Turns urine and eye secretions orange; may stain contact lenses)   Warm bath the first day helps relive discomfort  Pouring water over genitalia while urinating helps relieve discomfort  Cranberry juice facilitates healing  Increase fluids (6-8 glasses of water/day)  Avoid alcohol and caffeine (irritates bladder and may prolong infection)  Wipe front to back  Urinate before and after intercourse  Instructed to urinate with urge (avoid postponing urination; avoid a full bladder)  Cotton underwear  Avoid bubble baths/douching  If symptoms worsen or persist: fever 101.5 or greater, nausea, weakness, upper back pain, blood in urine, follow up with ER immediately     Headaches  Acetaminophen or ibuprofen for pain per pkg instructions  Maintain headache diary (4-6 weeks) noting precipitating/aggravating factors, duration, quality, location, relief measures, etc  Take diary to PCP  Avoid disrupted sleep patterns (not enough/too much)  Avoid skipping meals  Avoid consumption of certain foods (cheese, chocolate, foods containing nitrates which are found in processed foods, MSG)  Avoid alcoholic beverages (adults) especially red wine  Avoid caffeine overuse (soft drinks as well as coffee)  Minimize stress  If headaches worsen or persist, temp 101 degrees or greater, stiffness in neck, vomiting, visual changes go to nearest ER.     Fever  Monitor temperature   Acetaminophen (Tylenol) and Ibuprofen (Motrin) for fevers. You can alternate Acetaminophen then 4 hours later Ibuprofen then 4 hours later Acetaminophen - Alternate around the clock for fevers.   Increase fluids - should drink fluids every 15-60 mins.   Rest   Do not cover up with lots of blankets at home, just a sheet even if you have chills.  If developing confusion, change in mental status, seizures, stiffness to  Pt requesting to use the restroom before he is assessed and blood drawn. Pt provided urinal      Capri Gamez RN  12/06/19 1626     the neck, dehydration, swollen joints, temperature (101.5 degrees or greater), chest pain, or shortness of breath go to ER!     Pt counseled on dysuria, headaches, fever, medication/s use & when to seek emergency care vs PCP follow up.  Pt verbalizes understanding of  discharge instructions. No questions/concerns regarding this visit